# Patient Record
Sex: MALE | Race: WHITE
[De-identification: names, ages, dates, MRNs, and addresses within clinical notes are randomized per-mention and may not be internally consistent; named-entity substitution may affect disease eponyms.]

---

## 2018-01-15 ENCOUNTER — HOSPITAL ENCOUNTER (INPATIENT)
Dept: HOSPITAL 92 - ERS | Age: 78
LOS: 22 days | Discharge: ANOTHER HOSPITAL | DRG: 870 | End: 2018-02-06
Attending: FAMILY MEDICINE | Admitting: FAMILY MEDICINE
Payer: MEDICARE

## 2018-01-15 VITALS — BODY MASS INDEX: 20.7 KG/M2

## 2018-01-15 DIAGNOSIS — I24.8: ICD-10-CM

## 2018-01-15 DIAGNOSIS — Z79.01: ICD-10-CM

## 2018-01-15 DIAGNOSIS — E87.6: ICD-10-CM

## 2018-01-15 DIAGNOSIS — Z78.1: ICD-10-CM

## 2018-01-15 DIAGNOSIS — E11.9: ICD-10-CM

## 2018-01-15 DIAGNOSIS — I25.10: ICD-10-CM

## 2018-01-15 DIAGNOSIS — I27.20: ICD-10-CM

## 2018-01-15 DIAGNOSIS — I47.2: ICD-10-CM

## 2018-01-15 DIAGNOSIS — J96.02: ICD-10-CM

## 2018-01-15 DIAGNOSIS — I35.0: ICD-10-CM

## 2018-01-15 DIAGNOSIS — J44.9: ICD-10-CM

## 2018-01-15 DIAGNOSIS — E87.0: ICD-10-CM

## 2018-01-15 DIAGNOSIS — I42.9: ICD-10-CM

## 2018-01-15 DIAGNOSIS — I49.01: ICD-10-CM

## 2018-01-15 DIAGNOSIS — Z95.5: ICD-10-CM

## 2018-01-15 DIAGNOSIS — I11.0: ICD-10-CM

## 2018-01-15 DIAGNOSIS — I48.0: ICD-10-CM

## 2018-01-15 DIAGNOSIS — A41.9: Primary | ICD-10-CM

## 2018-01-15 DIAGNOSIS — Z99.81: ICD-10-CM

## 2018-01-15 DIAGNOSIS — I73.9: ICD-10-CM

## 2018-01-15 DIAGNOSIS — J44.0: ICD-10-CM

## 2018-01-15 DIAGNOSIS — E66.9: ICD-10-CM

## 2018-01-15 DIAGNOSIS — D69.6: ICD-10-CM

## 2018-01-15 DIAGNOSIS — Z95.0: ICD-10-CM

## 2018-01-15 DIAGNOSIS — R65.20: ICD-10-CM

## 2018-01-15 DIAGNOSIS — D50.9: ICD-10-CM

## 2018-01-15 DIAGNOSIS — I50.33: ICD-10-CM

## 2018-01-15 DIAGNOSIS — J96.21: ICD-10-CM

## 2018-01-15 DIAGNOSIS — I46.9: ICD-10-CM

## 2018-01-15 DIAGNOSIS — J18.9: ICD-10-CM

## 2018-01-15 DIAGNOSIS — I25.2: ICD-10-CM

## 2018-01-15 DIAGNOSIS — Z66: ICD-10-CM

## 2018-01-15 DIAGNOSIS — I48.2: ICD-10-CM

## 2018-01-15 LAB
ALBUMIN SERPL BCG-MCNC: 3.1 G/DL (ref 3.4–4.8)
ALP SERPL-CCNC: 51 U/L (ref 40–150)
ALT SERPL W P-5'-P-CCNC: 45 U/L (ref 8–55)
ANALYZER IN CARDIO: (no result)
ANION GAP SERPL CALC-SCNC: 15 MMOL/L (ref 10–20)
AST SERPL-CCNC: 41 U/L (ref 5–34)
BASE EXCESS STD BLDA CALC-SCNC: 5.8 MEQ/L
BILIRUB SERPL-MCNC: 0.5 MG/DL (ref 0.2–1.2)
BUN SERPL-MCNC: 22 MG/DL (ref 8.4–25.7)
CA-I BLDA-SCNC: 1.2 MMOL/L (ref 1.12–1.3)
CALCIUM SERPL-MCNC: 9 MG/DL (ref 7.8–10.44)
CHLORIDE SERPL-SCNC: 94 MMOL/L (ref 98–107)
CK MB SERPL-MCNC: 2.1 NG/ML (ref 0–6.6)
CK SERPL-CCNC: 21 U/L (ref 30–200)
CO2 SERPL-SCNC: 31 MMOL/L (ref 23–31)
CREAT CL PREDICTED SERPL C-G-VRATE: 0 ML/MIN (ref 70–130)
CRYSTAL-AUWI FLAG: 0.5 (ref 0–15)
GLOBULIN SER CALC-MCNC: 2.3 G/DL (ref 2.4–3.5)
GLUCOSE SERPL-MCNC: 229 MG/DL (ref 83–110)
HCO3 BLDA-SCNC: 32 MEQ/L (ref 22–26)
HCT VFR BLDA CALC: 28.9 % (ref 42–52)
HEV IGM SER QL: 16.5 (ref 0–7.99)
HGB BLD-MCNC: 9.9 G/DL (ref 14–18)
HGB BLDA-MCNC: 8.4 G/DL (ref 14–18)
HYALINE CASTS #/AREA URNS LPF: (no result) LPF
MCH RBC QN AUTO: 27.7 PG (ref 27–31)
MCV RBC AUTO: 92.8 FL (ref 80–94)
MDIFF COMPLETE?: YES
PATHC CAST-AUWI FLAG: 1.35 (ref 0–2.49)
PCO2 BLDA: 56.8 MMHG (ref 35–45)
PH BLDA: 7.37 [PH] (ref 7.35–7.45)
PLATELET # BLD AUTO: 365 THOU/UL (ref 130–400)
PLATELET BLD QL SMEAR: (no result)
PO2 BLDA: 87.9 MMHG (ref 80–100)
POTASSIUM SERPL-SCNC: 5.3 MMOL/L (ref 3.5–5.1)
RBC # BLD AUTO: 3.57 MILL/UL (ref 4.7–6.1)
RBC UR QL AUTO: (no result) HPF (ref 0–3)
SODIUM SERPL-SCNC: 135 MMOL/L (ref 136–145)
SP GR UR STRIP: 1.01 (ref 1–1.04)
SPECIMEN DRAWN FROM PATIENT: (no result)
SPERM-AUWI FLAG: 0 (ref 0–9.9)
TROPONIN I SERPL DL<=0.01 NG/ML-MCNC: 0.09 NG/ML (ref ?–0.03)
TROPONIN I SERPL DL<=0.01 NG/ML-MCNC: 0.91 NG/ML (ref ?–0.03)
TROPONIN I SERPL DL<=0.01 NG/ML-MCNC: 1.06 NG/ML (ref ?–0.03)
WBC # BLD AUTO: 27 THOU/UL (ref 4.8–10.8)
YEAST-AUWI FLAG: 0 (ref 0–25)

## 2018-01-15 PROCEDURE — 51702 INSERT TEMP BLADDER CATH: CPT

## 2018-01-15 PROCEDURE — 83880 ASSAY OF NATRIURETIC PEPTIDE: CPT

## 2018-01-15 PROCEDURE — 87070 CULTURE OTHR SPECIMN AEROBIC: CPT

## 2018-01-15 PROCEDURE — 96361 HYDRATE IV INFUSION ADD-ON: CPT

## 2018-01-15 PROCEDURE — 87040 BLOOD CULTURE FOR BACTERIA: CPT

## 2018-01-15 PROCEDURE — 96365 THER/PROPH/DIAG IV INF INIT: CPT

## 2018-01-15 PROCEDURE — 84484 ASSAY OF TROPONIN QUANT: CPT

## 2018-01-15 PROCEDURE — 80053 COMPREHEN METABOLIC PANEL: CPT

## 2018-01-15 PROCEDURE — 82553 CREATINE MB FRACTION: CPT

## 2018-01-15 PROCEDURE — 82805 BLOOD GASES W/O2 SATURATION: CPT

## 2018-01-15 PROCEDURE — 85007 BL SMEAR W/DIFF WBC COUNT: CPT

## 2018-01-15 PROCEDURE — P9016 RBC LEUKOCYTES REDUCED: HCPCS

## 2018-01-15 PROCEDURE — 82728 ASSAY OF FERRITIN: CPT

## 2018-01-15 PROCEDURE — C1769 GUIDE WIRE: HCPCS

## 2018-01-15 PROCEDURE — 86901 BLOOD TYPING SEROLOGIC RH(D): CPT

## 2018-01-15 PROCEDURE — 93005 ELECTROCARDIOGRAM TRACING: CPT

## 2018-01-15 PROCEDURE — 84157 ASSAY OF PROTEIN OTHER: CPT

## 2018-01-15 PROCEDURE — 71045 X-RAY EXAM CHEST 1 VIEW: CPT

## 2018-01-15 PROCEDURE — 85060 BLOOD SMEAR INTERPRETATION: CPT

## 2018-01-15 PROCEDURE — 86900 BLOOD TYPING SEROLOGIC ABO: CPT

## 2018-01-15 PROCEDURE — C9113 INJ PANTOPRAZOLE SODIUM, VIA: HCPCS

## 2018-01-15 PROCEDURE — 80048 BASIC METABOLIC PNL TOTAL CA: CPT

## 2018-01-15 PROCEDURE — 82746 ASSAY OF FOLIC ACID SERUM: CPT

## 2018-01-15 PROCEDURE — 94150 VITAL CAPACITY TEST: CPT

## 2018-01-15 PROCEDURE — 83540 ASSAY OF IRON: CPT

## 2018-01-15 PROCEDURE — 83550 IRON BINDING TEST: CPT

## 2018-01-15 PROCEDURE — 85025 COMPLETE CBC W/AUTO DIFF WBC: CPT

## 2018-01-15 PROCEDURE — 81001 URINALYSIS AUTO W/SCOPE: CPT

## 2018-01-15 PROCEDURE — 94003 VENT MGMT INPAT SUBQ DAY: CPT

## 2018-01-15 PROCEDURE — 85027 COMPLETE CBC AUTOMATED: CPT

## 2018-01-15 PROCEDURE — 96375 TX/PRO/DX INJ NEW DRUG ADDON: CPT

## 2018-01-15 PROCEDURE — 36416 COLLJ CAPILLARY BLOOD SPEC: CPT

## 2018-01-15 PROCEDURE — 82550 ASSAY OF CK (CPK): CPT

## 2018-01-15 PROCEDURE — 5A1955Z RESPIRATORY VENTILATION, GREATER THAN 96 CONSECUTIVE HOURS: ICD-10-PCS | Performed by: EMERGENCY MEDICINE

## 2018-01-15 PROCEDURE — 83986 ASSAY PH BODY FLUID NOS: CPT

## 2018-01-15 PROCEDURE — 80202 ASSAY OF VANCOMYCIN: CPT

## 2018-01-15 PROCEDURE — 88112 CYTOPATH CELL ENHANCE TECH: CPT

## 2018-01-15 PROCEDURE — 83605 ASSAY OF LACTIC ACID: CPT

## 2018-01-15 PROCEDURE — 83615 LACTATE (LD) (LDH) ENZYME: CPT

## 2018-01-15 PROCEDURE — 86850 RBC ANTIBODY SCREEN: CPT

## 2018-01-15 PROCEDURE — 71250 CT THORAX DX C-: CPT

## 2018-01-15 PROCEDURE — 88305 TISSUE EXAM BY PATHOLOGIST: CPT

## 2018-01-15 PROCEDURE — 82945 GLUCOSE OTHER FLUID: CPT

## 2018-01-15 PROCEDURE — 93306 TTE W/DOPPLER COMPLETE: CPT

## 2018-01-15 PROCEDURE — 94640 AIRWAY INHALATION TREATMENT: CPT

## 2018-01-15 PROCEDURE — 87149 DNA/RNA DIRECT PROBE: CPT

## 2018-01-15 PROCEDURE — 83735 ASSAY OF MAGNESIUM: CPT

## 2018-01-15 PROCEDURE — 36430 TRANSFUSION BLD/BLD COMPNT: CPT

## 2018-01-15 PROCEDURE — A4216 STERILE WATER/SALINE, 10 ML: HCPCS

## 2018-01-15 PROCEDURE — 93458 L HRT ARTERY/VENTRICLE ANGIO: CPT

## 2018-01-15 PROCEDURE — 76942 ECHO GUIDE FOR BIOPSY: CPT

## 2018-01-15 PROCEDURE — 94002 VENT MGMT INPAT INIT DAY: CPT

## 2018-01-15 PROCEDURE — 89051 BODY FLUID CELL COUNT: CPT

## 2018-01-15 PROCEDURE — 36415 COLL VENOUS BLD VENIPUNCTURE: CPT

## 2018-01-15 PROCEDURE — 82607 VITAMIN B-12: CPT

## 2018-01-15 PROCEDURE — 87205 SMEAR GRAM STAIN: CPT

## 2018-01-15 RX ADMIN — VANCOMYCIN HYDROCHLORIDE SCH MLS: 1 INJECTION, SOLUTION INTRAVENOUS at 21:54

## 2018-01-15 RX ADMIN — VANCOMYCIN HYDROCHLORIDE SCH MLS: 1 INJECTION, SOLUTION INTRAVENOUS at 09:56

## 2018-01-15 RX ADMIN — Medication SCH ML: at 20:12

## 2018-01-15 RX ADMIN — Medication SCH ML: at 09:20

## 2018-01-15 RX ADMIN — CEFEPIME HYDROCHLORIDE SCH MLS: 2 INJECTION, POWDER, FOR SOLUTION INTRAVENOUS at 21:10

## 2018-01-15 RX ADMIN — CEFEPIME HYDROCHLORIDE SCH MLS: 2 INJECTION, POWDER, FOR SOLUTION INTRAVENOUS at 09:19

## 2018-01-15 NOTE — CON
DATE OF CONSULTATION:  01/15/2018

 

HISTORY OF PRESENT ILLNESS:  Mr. Montelongo is a 77-year-old male.  He is not someone who has been in [facility] frequently here.

 

He presented via EMS after being in the field, intubated.

 

EMS was called for shortness of breath.  Apparently they started him on CPAP, he became unresponsive,
 so he was intubated.

 

He received 200 ketamine, 100 mg of rocuronium and 10 mg of Versed prior to arrival reportedly.

 

PAST MEDICAL HISTORY:  According to the ER records are remarkable for coronary disease, cardiomyopath
y, atrial fibrillation, diabetes, hypertension, COPD, asthma, pneumonia and a pacemaker.

 

SOCIAL HISTORY:  It is unknown whether he smokes or drinks.

 

ALLERGIES:  There are no reported drug allergies prior to admission.

 

MEDICATIONS:  It is unclear what medicines he was on.

 

REVIEW OF SYSTEMS:  Cannot be obtained.

 

PHYSICAL EXAMINATION:

VITAL SIGNS:  Blood pressure 124/37, heart rate 71, he is afebrile, respiratory rate is 20, oximetry 
is 99.

HEENT:  Pupils are equal.  Sclerae are anicteric.

LUNGS:  Clear.

GENERAL:  He appears chronically ill.

HEART:  Regular rhythm.  S1 and S2 are normal.

ABDOMEN:  Soft and nontender.

EXTREMITIES:  Without clubbing, cyanosis, or edema.

 

LABORATORY DATA:  White count 27, hemoglobin 9.9, platelets 365.  Sodium 135, potassium 5.3, chloride
 94, bicarbonate 31, BUN 22, creatinine 0.9, glucose 229, AST 41, ALT 45.  CPK was 21, troponin 0.094
.  BNP 1471, albumin 3.1.  Chest radiographs, reviewed by me, shows diffuse infiltrates, worse on the
 right.

 

IMPRESSION:  Respiratory failure.  In my opinion, his acute onset and chest radiographs are more cons
istent with congestive heart failure than pneumonia.  I do agree with treating with pneumonia at [facility] initially.  Echocardiogram has been ordered.  His cardiologist should be consulted.  It is unclear 
to me who he sees at this time.  When family becomes available, I will be happy to meet with them.

 

I found a stress test report from 2011, but this was ordered by one of the hospitalists.

 

Getting into the old computer system, he has been seen by Dr. Cameron in 2011.  He was intubated in [facility] emergency department at that admission; he was extubated on the 10th.

 

I cannot find any evidence that he has been seen by a cardiologist here.  He did have a severe FEV1 r
eduction on PFTs done when he was seen in 2011 by Dr. Cameron.  We will continue to support him.  We 
will await echocardiogram, and I will review that.  We will probably consult Cardiology.  We will con
tinue with IV antibiotics for now.  Steroids will be added and nebulizer treatments will be added as 
well.

 

Critical care time 35 minutes.

## 2018-01-15 NOTE — RAD
SINGLE VIEW OF THE CHEST:

 

COMPARISON: 

12/16/11.

 

HISTORY: 

Respiratory distress.

 

FINDINGS: 

A single view of the chest shows an enlarged but stable cardiomediastinal silhouette.  An endotrachea
l tube is seen with its tip between the clavicles.  An NG tube has its tip in the stomach and its sánchez
eport near the esophagogastric junction.  A left-sided pacemaker has been placed in the interval.  It
s leads are in the right atrium and ventricle.  There appears to be elevation of the right hemidiaphr
agm, but this could represent a moderate right pleural effusion.  There are multifocal increased inte
rstitial lung markings.  There appear to be superimposed multifocal infiltrates.  There are remote he
aled right rib fractures.

 

IMPRESSION: 

1.  Moderate right pleural effusion.

2.  Multifocal pneumonia.

 

POS: [facility]

## 2018-01-15 NOTE — HP
CHIEF COMPLAINT:  Short of breath, intubated.

 

HISTORY OF PRESENT ILLNESS:  This is a 77-year-old man with a history of chronic obstructive pulmonar
y disease, on home oxygen.  He was noted to have respiratory distress at home.  EMS was activated.  KAYLI mota was given nebulizer and CPAP was placed, and he became more altered and breathing was stopped, so kayli
e was intubated by the EMS with a 7.0 tube and given Versed and they brought him to the ER. 

 

PAST MEDICAL HISTORY:  History of atrial fibrillation, COPD, pacemaker, MI.

 

MEDICATIONS:  He is taking levofloxacin 750 daily, Norco 10/325 daily, methylprednisone 4 mg daily, a
torvastatin 80 mg daily, Symbicort aerosol twice daily, clopidogrel 75 daily, diltiazem 240 mg daily,
 Multaq 400 mg daily, ferrous sulfate 325 every day, furosemide 40 mg every day, gabapentin 300 mg da
marcus, hydroxyzine 20 mg daily, DuoNeb, Combivent, Lopressor 50 daily, potassium daily, Xarelto 20 mg d
aily, Spiriva 2 puffs daily.

 

REVIEW OF SYSTEMS:  Not possible, because intubated and sedated.

 

PHYSICAL EXAMINATION:

GENERAL:  He is a 77-year-old man, lying in the bed, intubated, and sedated on FiO2 50%, tidal volume
 350 and PEEP 5.

HEENT:  Head is normocephalic.  Pupils are round and reactive.  Ears, nose, throat normal.  NECK:  Lerma
pple, no JVD.  Trachea midline.

CHEST EXAMINATION:  Intubated and he has bilateral decreased breath sounds with diffuse expiratory wh
eezing or rhonchi.

CARDIOVASCULAR:  S1, S2 audible.  No S3 or S4.

ABDOMEN:  Soft.  Bowel sounds audible.  No distention, no mass, no organomegaly.

EXTREMITIES:  He has bilateral pedal edema present that is +2.

NEUROLOGIC:  He is a GCS 3, intubated and sedated.

SKIN:  Normal in color.

 

LABORATORY DATA:  Shows EKG, atrial fibrillation with controlled rate, no ST changes.  A chest x-ray 
shows right upper lobe infiltrate.

 

Lab shows WBC of 27.0, hematocrit 33.1, hemoglobin 9.0, platelets 365.  Sodium 135, potassium 5.3, ch
loride 94, carbon dioxide 31, BUN 20, creatinine 0.9, GFR 80.  Lactic acid 3.7, calcium 9.0, total bi
lirubin 0.5, AST 41, ALT 45.  Troponin 0.094.  BNP 1471.8.  Serum protein 5.4, albumin 3.1, and globu
codie 2.3, albumin ratio 1.3.  Blood gas shows a pH of 7.37, pCO2 of 56, pO2 of 87, bicarbonate 32.  Ch
est x-ray shows the above infiltrate.

 

ASSESSMENT AND PLAN:

1.  Acute respiratory failure with hypoxia and hypercapnia.

2.  Chronic obstructive pulmonary disease.

3.  Intubated and sedated.  Continue ventilator protocol, IV propofol for sedation.  Intensivist, Dr. Dodd, consulted.

4.  Sepsis secondary to pneumonia.  Blood culture x2 was done.  Continue IV Rocephin and Zithromax.  
Follow CBC.

5.  History of chronic obstructive pulmonary disease.  Continue nebulizer.

6.  Deep venous thrombosis prophylaxis will be Lovenox and stress ulcer prophylaxis PPI.

 

PROGNOSIS:  Guarded.

## 2018-01-15 NOTE — CON
DATE OF CONSULTATION:  01/15/2018

 

REASON FOR CONSULTATION:  Respiratory failure.

 

HISTORY OF PRESENT ILLNESS:  Ms. Montelongo is a 77-year-old man.

 

The patient was brought to the hospital with respiratory failure and is intubated on the ventilator, 
very little history is available at the present time.

 

The patient was found to be in respiratory failure in the field, EMS intubated and brought him to her
e.

 

PAST MEDICAL HISTORY:

1.  The patient has a history of coronary artery disease, but no other information is available.

2.  History of cardiomyopathy.

3.  History of atrial fibrillation.

4.  Diabetes.

5.  COPD.

6.  Asthma.

7.  Pneumonia.

8.  Previous pacemaker.

 

SOCIAL HISTORY:  Unknown if he smokes or drinks.

 

ALLERGIES:  No reported drug allergies.

 

MEDICATIONS:  Unknown at the present time.

 

REVIEW OF SYSTEMS:  Not obtainable, intubated on the ventilator.

 

PHYSICAL EXAMINATION:

GENERAL:  This is a critically ill-appearing elderly gentleman who looks older than his stated age at
 77.

VITAL SIGNS:  His blood pressure is 124/40, pulse is 70.

HEENT:  Eyes nonicteric.

NECK:  Neck veins are normal.  Carotid normal upstrokes.

LUNGS:  There is diffuse expiratory wheezing, some rhonchi.

CARDIAC:  Normal S1, normal S2.  Somewhat distant.  I do not hear a murmur, rub or gallop.

ABDOMEN:  Soft, nontender, no hepatosplenomegaly.

EXTREMITIES:  No clubbing or cyanosis.  There is moderate edema.  Warm and dry.  Peripheral pulses:  
He has femoral pulses bilaterally.  I do not feel pedal pulses on either foot, I do not feel poplitea
l pulses.

 

He is intubated and sedated.

 

LABORATORY AND X-RAY FINDINGS:  EKG reveals what looks like atrial fibrillation with some ST and T-wa
ve changes, consider ischemia.

 

Chest x-ray shows congestive heart failure, pulmonary vascular congestion, large right pleural effusi
on, probable pneumonia.  The pacemaker is in place as well.

 

Other laboratory data:  BNP is elevated at 1471.8.  Troponin 0.094 initially and 0.9 followup.

 

ASSESSMENT:

1.  Congestive heart failure.  Echocardiogram showed normal left ventricular function; therefore, it 
appears to be acute diastolic heart failure, probably on chronic, most likely acute on chronic diasto
lic heart failure.

2.  Non-ST elevation infarction, probably demand ischemia secondary likely to respiratory insufficien
cy.

3.  Underlying coronary artery disease.

4.  Atrial fibrillation, unknown chronicity.

 

PLAN:

1.  Hopefully, some old records could be obtained.

2.  We will give him some intravenous Lasix.

3.  Aspirin.

 

Prognosis is guarded in this gentleman.  Hopefully, as mentioned other information could be obtained 
and it appears he also has pneumonia.

## 2018-01-16 LAB
ALBUMIN SERPL BCG-MCNC: 2.9 G/DL (ref 3.4–4.8)
ALP SERPL-CCNC: 43 U/L (ref 40–150)
ALT SERPL W P-5'-P-CCNC: 30 U/L (ref 8–55)
ANALYZER IN CARDIO: (no result)
ANION GAP SERPL CALC-SCNC: 12 MMOL/L (ref 10–20)
ANISOCYTOSIS BLD QL SMEAR: (no result) (100X)
AST SERPL-CCNC: 22 U/L (ref 5–34)
BASE EXCESS STD BLDA CALC-SCNC: 8.6 MEQ/L
BASOPHILS # BLD AUTO: 0 THOU/UL (ref 0–0.2)
BASOPHILS NFR BLD AUTO: 0.1 % (ref 0–1)
BILIRUB SERPL-MCNC: 0.5 MG/DL (ref 0.2–1.2)
BUN SERPL-MCNC: 22 MG/DL (ref 8.4–25.7)
CA-I BLDA-SCNC: 1.1 MMOL/L (ref 1.12–1.3)
CALCIUM SERPL-MCNC: 8.8 MG/DL (ref 7.8–10.44)
CHLORIDE SERPL-SCNC: 99 MMOL/L (ref 98–107)
CO2 SERPL-SCNC: 32 MMOL/L (ref 23–31)
CREAT CL PREDICTED SERPL C-G-VRATE: 81 ML/MIN (ref 70–130)
EOSINOPHIL # BLD AUTO: 0 THOU/UL (ref 0–0.7)
EOSINOPHIL NFR BLD AUTO: 0.2 % (ref 0–10)
GLOBULIN SER CALC-MCNC: 2.1 G/DL (ref 2.4–3.5)
GLUCOSE SERPL-MCNC: 112 MG/DL (ref 83–110)
HCO3 BLDA-SCNC: 31 MEQ/L (ref 22–26)
HGB BLD-MCNC: 8.3 G/DL (ref 14–18)
HGB BLD-MCNC: 8.3 G/DL (ref 14–18)
HGB BLDA-MCNC: 8.2 G/DL (ref 14–18)
LYMPHOCYTES # BLD: 0.7 THOU/UL (ref 1.2–3.4)
LYMPHOCYTES NFR BLD AUTO: 6.3 % (ref 21–51)
MCH RBC QN AUTO: 28.7 PG (ref 27–31)
MCH RBC QN AUTO: 29 PG (ref 27–31)
MCV RBC AUTO: 89.7 FL (ref 80–94)
MCV RBC AUTO: 89.7 FL (ref 80–94)
MDIFF COMPLETE?: YES
MONOCYTES # BLD AUTO: 0.2 THOU/UL (ref 0.11–0.59)
MONOCYTES NFR BLD AUTO: 1.7 % (ref 0–10)
NEUTROPHILS # BLD AUTO: 10.8 THOU/UL (ref 1.4–6.5)
NEUTROPHILS NFR BLD AUTO: 91.8 % (ref 42–75)
O2 A-A PPRESDIFF RESPIRATORY: 125.4 MM[HG] (ref 0–20)
PCO2 BLDA: 33.6 MMHG (ref 35–45)
PH BLDA: 7.58 [PH] (ref 7.35–7.45)
PLATELET # BLD AUTO: 206 THOU/UL (ref 130–400)
PLATELET # BLD AUTO: 206 THOU/UL (ref 130–400)
PLATELET BLD QL SMEAR: (no result)
PO2 BLDA: 117.8 MMHG (ref 80–100)
POTASSIUM SERPL-SCNC: 4.5 MMOL/L (ref 3.5–5.1)
RBC # BLD AUTO: 2.88 MILL/UL (ref 4.7–6.1)
RBC # BLD AUTO: 2.89 MILL/UL (ref 4.7–6.1)
SCHISTOCYTES BLD QL SMEAR: (no result) (100X)
SODIUM SERPL-SCNC: 138 MMOL/L (ref 136–145)
SPECIMEN DRAWN FROM PATIENT: (no result)
VANCOMYCIN TROUGH SERPL-MCNC: 16.6 UG/ML
WBC # BLD AUTO: 11.8 THOU/UL (ref 4.8–10.8)
WBC # BLD AUTO: 11.8 THOU/UL (ref 4.8–10.8)

## 2018-01-16 RX ADMIN — Medication SCH ML: at 09:19

## 2018-01-16 RX ADMIN — VANCOMYCIN HYDROCHLORIDE SCH MLS: 1 INJECTION, SOLUTION INTRAVENOUS at 22:19

## 2018-01-16 RX ADMIN — VANCOMYCIN HYDROCHLORIDE SCH MLS: 1 INJECTION, SOLUTION INTRAVENOUS at 09:19

## 2018-01-16 RX ADMIN — Medication SCH ML: at 20:12

## 2018-01-16 RX ADMIN — CEFEPIME HYDROCHLORIDE SCH MLS: 2 INJECTION, POWDER, FOR SOLUTION INTRAVENOUS at 20:12

## 2018-01-16 RX ADMIN — CEFEPIME HYDROCHLORIDE SCH MLS: 2 INJECTION, POWDER, FOR SOLUTION INTRAVENOUS at 09:18

## 2018-01-16 NOTE — PDOC.PN
- Subjective


Encounter Start Date: 01/16/18


Encounter Start Time: 16:38





Patient seen and examined. No new complaints. No overnight events





- Objective


MAR Reviewed: Yes


Vital Signs & Weight: 


 Vital Signs (12 hours)











  Temp Pulse Resp BP Pulse Ox


 


 01/16/18 16:00  99.3 F   22 H  


 


 01/16/18 14:36   82   109/44 L 


 


 01/16/18 14:34   85  20   97


 


 01/16/18 14:00    21 H  


 


 01/16/18 12:00  99.4 F   20  


 


 01/16/18 11:19   77   119/43 L 


 


 01/16/18 11:18   81  23 H   97


 


 01/16/18 10:00    20  


 


 01/16/18 08:00    20  


 


 01/16/18 07:44  99.3 F  74  20   98


 


 01/16/18 07:10   74   130/43 L 


 


 01/16/18 07:08   71  20   99


 


 01/16/18 07:00  99.3 F    


 


 01/16/18 06:00    20  








 Weight











Admit Weight                   158 lb


 


Weight                         158 lb 8.198 oz











 Most Recent Monitor Data











Heart Rate from ECG            82


 


NIBP                           109/44


 


NIBP BP-Mean                   84


 


Respiration from ECG           21


 


SpO2                           99














I&O: 


 











 01/15/18 01/16/18 01/17/18





 06:59 06:59 06:59


 


Intake Total  1877 200


 


Output Total  2315 635


 


Balance  -438 -435











Result Diagrams: 


 01/16/18 04:37





 01/16/18 04:37





Phys Exam





- Physical Examination


intubated


HEENT: moist MMs


Neck: no JVD


decreased bs at bases


Cardiovascular: no significant murmur


Musculoskeletal: pulses present





Dx/Plan


(1) Acute respiratory failure


Code(s): J96.00 - ACUTE RESPIRATORY FAILURE, UNSP W HYPOXIA OR HYPERCAPNIA   

Status: Acute   





(2) PNA (pneumonia)


Code(s): J18.9 - PNEUMONIA, UNSPECIFIED ORGANISM   Status: Acute   





(3) Sepsis


Code(s): A41.9 - SEPSIS, UNSPECIFIED ORGANISM   Status: Acute   





(4) Pleural effusion


Code(s): J90 - PLEURAL EFFUSION, NOT ELSEWHERE CLASSIFIED   Status: Acute   





- Plan





* cont vent support


* abx


* gentle diuresis


* f/u pulm and card plan

## 2018-01-16 NOTE — CT
EXAM: CT chest without contrast

 

DATE: 1/16/18

 

COMPARISON: 1/16/18

 

FINDINGS: 

 

The patient is intubated, endotracheal tube tip below the clavicles.  An enteric tube tip is in the g
astric body.

 

There are large bilateral layering pleural effusions.  Heart size is enlarged.  No pericardial effusi
on. 

 

Numerous mediastinal lymph nodes are present, the majority of these contain normal fatty hilum.

 

Healing right lateral 3rd, right lateral 4th, right anterior 4th, right lateral 5th, right lateral 6t
h, 8th, 9th, 10th rib fractures.  There are also healing left anterior 4th, 5th, and 6th rib fracture
s, and lateral 7th, 8th, 9th, and 10th rib fractures.

 

There are spine compression fractures at T4 and T5 and T11.  The gallbladder is mildly distended.  Th
e spleen measures 14.5 cm. 

 

IMPRESSION: 

1.  Large bilateral layering pleural effusions.

2.  Airspace consolidation in the right middle lobe and right upper lobe concerning for pneumonia.  

3.  Cardiomegaly.

4.  Numerous bilateral rib fractures.

 

POS: [facility]

## 2018-01-16 NOTE — PRG
DATE OF SERVICE:  01/16/2018

 

SUBJECTIVE:  Mr. Montelongo is intubated and on the ventilator, sedated.

 

OBJECTIVE:

VITAL SIGNS:  Blood pressure 119/43, pulse 80.

LUNGS:  Clear.

CARDIAC:  Heart sounds are easier to hear today.  There is a 2-3/6 crescendo-decrescendo murmur along
 the left sternal border and systolic.

ABDOMEN:  Soft, nontender.

EXTREMITIES:  Only mild edema.

 

IMAGING:  Echocardiogram, normal ejection fraction, mild to moderate aortic stenosis.

 

ASSESSMENT:

1.  Respiratory failure.

2.  Coronary artery disease, no details available.

3.  Mild to moderate aortic stenosis.

4.  History of atrial fibrillation.

5.  Previous pacemaker insertion.

6.  Pacemaker is a [employer] device, normal device check, A-pacing 93%, currently in sinus rhythm, 25 
episodes of atrial fibrillation since October, battery is okay.

 

PLAN:

1.  Continue current medical regimen.

2.  Continue intravenous furosemide.

3.  Reduce Lovenox back to 40 q.12 hours as he is maintaining sinus rhythm.

## 2018-01-16 NOTE — PRG
DATE OF SERVICE:  01/16/2018

 

SUBJECTIVE:  Mr. Montelongo is hemodynamically stable overnight.

 

PHYSICAL EXAMINATION:

VITAL SIGNS:  His temperature maximum was 99.4.  Respiratory rate is in 20s 
when he is sedated.  Heart rate is 94, blood pressure 124/59.  Intake and 
output is -438.

GENERAL:  He will awaken.  He is pretty much all over the bed if he awakens.

LUNGS:  Remarkable for crackles bilaterally.

HEART:  Regular rhythm.

ABDOMEN:  Soft.

 

LABORATORY DATA AND IMAGING DATA:  White count 13.8, hemoglobin 8.3, platelets 
206,000.  Sodium 138, potassium 4.5, chloride 99, bicarbonate 32, BUN 22, 
creatinine 0.78.  Chest radiograph reviewed by me shows what appears to be a 
large right effusion.  Echocardiogram showed noncritical aortic stenosis and a 
normal ejection fraction.

 

IMPRESSION:

1.  Pneumonia.

2.  Respiratory failure.

3.  ? underlying pulmonary fibrosis.  I do not have any old imaging for 
comparison.

 

PLAN:  Continue ventilatory support.  CT scanning of his chest today without 
contrast.  Continue gentle diuresis.

 

CRITICAL CARE TIME :  Reviewing radiographs records and coordinating care 30 
minutes.

 

[facility]

## 2018-01-16 NOTE — RAD
CHEST ONE VIEW:

 

History: Dyspnea, follow up. 

 

Comparison: 1-15-18

 

FINDINGS: 

Cardiac silhouette is magnified by projection and partially obscured by pleural fluid and patchy biba
silar infiltrates, right greater than left. Pulmonary vasculature remains engorged with wide-spread r
eticular nodular interstitial prominence. Patient rotated leftward. Lines and tubes appear unchanged 
in position. 

 

IMPRESSION: 

1. Pulmonary edema with pleural fluid and other findings is stable compared to the previous exam. 

 

POS: [facility]

## 2018-01-17 LAB
ALBUMIN SERPL BCG-MCNC: 2.8 G/DL (ref 3.4–4.8)
ALP SERPL-CCNC: 44 U/L (ref 40–150)
ALT SERPL W P-5'-P-CCNC: 25 U/L (ref 8–55)
ANALYZER IN CARDIO: (no result)
ANION GAP SERPL CALC-SCNC: 15 MMOL/L (ref 10–20)
AST SERPL-CCNC: 20 U/L (ref 5–34)
BASE EXCESS STD BLDA CALC-SCNC: 7.8 MEQ/L
BASOPHILS # BLD AUTO: 0 THOU/UL (ref 0–0.2)
BASOPHILS NFR BLD AUTO: 0.1 % (ref 0–1)
BILIRUB SERPL-MCNC: 0.6 MG/DL (ref 0.2–1.2)
BUN SERPL-MCNC: 23 MG/DL (ref 8.4–25.7)
CALCIUM SERPL-MCNC: 9.1 MG/DL (ref 7.8–10.44)
CHLORIDE SERPL-SCNC: 101 MMOL/L (ref 98–107)
CO2 SERPL-SCNC: 26 MMOL/L (ref 23–31)
CREAT CL PREDICTED SERPL C-G-VRATE: 85 ML/MIN (ref 70–130)
EOSINOPHIL # BLD AUTO: 0 THOU/UL (ref 0–0.7)
EOSINOPHIL NFR BLD AUTO: 0.1 % (ref 0–10)
GLOBULIN SER CALC-MCNC: 2.5 G/DL (ref 2.4–3.5)
GLUCOSE SERPL-MCNC: 117 MG/DL (ref 83–110)
HCO3 BLDA-SCNC: 33.1 MEQ/L (ref 22–26)
HGB BLD-MCNC: 9.5 G/DL (ref 14–18)
HGB BLD-MCNC: 9.5 G/DL (ref 14–18)
HGB BLDA-MCNC: 8.2 G/DL (ref 14–18)
LYMPHOCYTES # BLD: 0.7 THOU/UL (ref 1.2–3.4)
LYMPHOCYTES NFR BLD AUTO: 8.5 % (ref 21–51)
MCH RBC QN AUTO: 29.1 PG (ref 27–31)
MCH RBC QN AUTO: 29.5 PG (ref 27–31)
MCV RBC AUTO: 89.5 FL (ref 80–94)
MCV RBC AUTO: 89.8 FL (ref 80–94)
MDIFF COMPLETE?: YES
MONOCYTES # BLD AUTO: 0.2 THOU/UL (ref 0.11–0.59)
MONOCYTES NFR BLD AUTO: 2.5 % (ref 0–10)
NEUTROPHILS # BLD AUTO: 7.7 THOU/UL (ref 1.4–6.5)
NEUTROPHILS NFR BLD AUTO: 88.8 % (ref 42–75)
PCO2 BLDA: 50.8 MMHG (ref 35–45)
PH BLDA: 7.43 [PH] (ref 7.35–7.45)
PLATELET # BLD AUTO: 202 THOU/UL (ref 130–400)
PLATELET # BLD AUTO: 204 THOU/UL (ref 130–400)
PLATELET BLD QL SMEAR: (no result)
PO2 BLDA: 80.3 MMHG (ref 80–100)
POTASSIUM SERPL-SCNC: 4 MMOL/L (ref 3.5–5.1)
RBC # BLD AUTO: 3.23 MILL/UL (ref 4.7–6.1)
RBC # BLD AUTO: 3.28 MILL/UL (ref 4.7–6.1)
SODIUM SERPL-SCNC: 138 MMOL/L (ref 136–145)
SPECIMEN DRAWN FROM PATIENT: (no result)
WBC # BLD AUTO: 8.7 THOU/UL (ref 4.8–10.8)
WBC # BLD AUTO: 8.7 THOU/UL (ref 4.8–10.8)

## 2018-01-17 RX ADMIN — CEFEPIME HYDROCHLORIDE SCH MLS: 2 INJECTION, POWDER, FOR SOLUTION INTRAVENOUS at 20:25

## 2018-01-17 RX ADMIN — VANCOMYCIN HYDROCHLORIDE SCH MLS: 1 INJECTION, SOLUTION INTRAVENOUS at 10:25

## 2018-01-17 RX ADMIN — VANCOMYCIN HYDROCHLORIDE SCH MLS: 1 INJECTION, SOLUTION INTRAVENOUS at 21:28

## 2018-01-17 RX ADMIN — Medication SCH ML: at 20:25

## 2018-01-17 RX ADMIN — Medication SCH ML: at 08:09

## 2018-01-17 RX ADMIN — CEFEPIME HYDROCHLORIDE SCH MLS: 2 INJECTION, POWDER, FOR SOLUTION INTRAVENOUS at 08:07

## 2018-01-17 NOTE — PDOC.PN
- Subjective


Encounter Start Date: 01/17/18


Encounter Start Time: 08:40


-: old records requested/rev





Pt seen and exmained, chart reviewed in its entirety, this si my first visit 

with this patient





Admitted 2 days ago with PNA, Acute hypoxemic respiratory failure.  Pt sedation 

turned off, he bcame very anxious and tachypneic and restarted.  Pulm 

following.  NO other acute events overnight.





no F/C, no N/V/D/C, no arrhythmias





ROS not obtainable due to intubated and sedated status





- Objective


MAR Reviewed: Yes


Vital Signs & Weight: 


 Vital Signs (12 hours)











  Temp Pulse Resp BP Pulse Ox


 


 01/17/18 14:32   97   130/49 L 


 


 01/17/18 14:29   85  19   99


 


 01/17/18 14:00    20  


 


 01/17/18 12:00    22 H  


 


 01/17/18 11:00  98.6 F    


 


 01/17/18 10:50   84   113/51 L 


 


 01/17/18 10:49   84  32 H   98


 


 01/17/18 10:00    25 H  


 


 01/17/18 08:00  98.8 F  83  22 H   99


 


 01/17/18 07:01   85   118/50 L 


 


 01/17/18 07:00  98.8 F  77  23 H   99


 


 01/17/18 06:00    21 H  


 


 01/17/18 04:08   85   119/49 L 


 


 01/17/18 04:00    21 H  


 


 01/17/18 03:01   80  20   98


 


 01/17/18 03:00  98.4 F    








 Weight











Admit Weight                   158 lb


 


Weight                         157 lb 13.616 oz











 Most Recent Monitor Data











Heart Rate from ECG            88


 


NIBP                           130/49


 


NIBP BP-Mean                   94


 


Respiration from ECG           14


 


SpO2                           98














I&O: 


 











 01/16/18 01/17/18 01/18/18





 06:59 06:59 06:59


 


Intake Total 1877 2157 


 


Output Total 9005 7814 826


 


Balance -069 -528 -107











Result Diagrams: 


 01/17/18 05:23





 01/17/18 05:23


Radiology Reviewed by me: Yes


EKG Reviewed by me: Yes





Phys Exam





- Physical Examination


Constitutional: NAD


HEENT: PERRLA, moist MMs, sclera anicteric, oral pharynx no lesions


orally intubated


Neck: no nodes, no JVD, supple, full ROM


Respiratory: no wheezing, no rales, no rhonchi, clear to auscultation bilateral


Cardiovascular: RRR, no significant murmur, no rub


Gastrointestinal: soft, non-tender, no distention, positive bowel sounds


Musculoskeletal: pulses present, edema present


Neurological: moves all 4 limbs


Lymphatic: no nodes


Deviation from normal: sedated and intubated


Skin: no rash, normal turgor, cap refill <2 seconds





Dx/Plan


(1) Acute hypoxemic respiratory failure


Code(s): J96.01 - ACUTE RESPIRATORY FAILURE WITH HYPOXIA   Status: Acute   

Comment: intubated on vent, weaning per pulm,.  appreciate Dr Jerome's 

assistance   





(2) PNA (pneumonia)


Code(s): J18.9 - PNEUMONIA, UNSPECIFIED ORGANISM   Status: Acute   


Qualifiers: 


   Pneumonia type: due to unspecified organism   Laterality: bilateral   Lung 

location: unspecified part of lung   Qualified Code(s): J18.9 - Pneumonia, 

unspecified organism   





(3) Sepsis


Code(s): A41.9 - SEPSIS, UNSPECIFIED ORGANISM   Status: Acute   


Qualifiers: 


   Sepsis type: sepsis due to unspecified organism   Qualified Code(s): A41.9 - 

Sepsis, unspecified organism   





(4) COPD (chronic obstructive pulmonary disease)


Status: Chronic   


Qualifiers: 


   COPD type: unspecified COPD   Qualified Code(s): J44.9 - Chronic obstructive 

pulmonary disease, unspecified   





- Plan


cont current plan of care, continue antibiotics, PT/OT, respiratory therapy, 

DVT proph w/lovenox





* .

## 2018-01-17 NOTE — RAD
SINGLE VIEW OF THE CHEST:

 

Comparison: 1-16-18

 

History: Ventilated patient with respiratory failure. 

 

FINDINGS: 

Single view of the chest shows an enlarged but stable cardiomediastinal silhouette. The lines and tub
es are unchanged in position. The pacemaker is unchanged in position. There is a moderate right pleur
al effusion. There appear to be multifocal infiltrates, unchanged, right greater than left. 

 

IMPRESSION: 

Stable exam. 

 

POS: [facility]

## 2018-01-17 NOTE — PRG
DATE OF SERVICE:  01/17/2018

 

SUBJECTIVE:  Mr. Montelongo remains mechanically ventilated.  He is in no 
distress.  He is sedated.

 

OBJECTIVE:

VITAL SIGNS:  He is afebrile, heart rate 84, blood pressure 113/51, respiratory 
rate in the high 20s.

LUNGS:  Remarkable for crackles on the right with decreased sounds in both lung 
bases.

HEART:  Regular rhythm.

ABDOMEN:  Soft.

EXTREMITIES:  Without asymmetry.

 

LABORATORY DATA:  White count 8.7, hemoglobin 9.5, platelets 202, 88 segs, 3% 
bands.

 

Sodium 138, potassium 4, chloride 101, bicarbonate 26, BUN 23, creatinine 0.7, 
glucose 117, pH 7.43, CO2 50, pO2 80.

 

Intake and output is negative, 448.

 

Chest radiograph still shows alveolar infiltrates on the right with a pleural 
effusion.

 

IMPRESSION:

1.  Pneumonia.

2.  Bilateral effusions.

3.  Intermittent atrial fibrillation.

4.  Aortic stenosis, mild to moderate.

5.  History of coronary disease.

6.  History of pacemaker.

 

PLAN:  We will probably need to place a small chest tube on the right.  Culture 
of the fluid was sent it for analysis and lighting drain into pleurovac.  It 
does not appear to be loculated by CT scanning, it is unclear how much is old 
versus new.

 

We will try to obtain medical records from [facility].  X-ray reports 
would be at least a little helpful.

 

He is currently not weanable.

 

Critical care time was 30 minutes.

 

[facility]

## 2018-01-17 NOTE — PRG
DATE OF SERVICE:  01/17/2018

 

SUBJECTIVE:  Mr. Montelongo remains intubated on the ventilator.

 

REVIEW OF SYSTEMS:  Not obtainable.

 

OBJECTIVE:

VITAL SIGNS:  Blood pressure 123/52, pulse is in the 80s-90s, it is irregular, atrial fibrillation.

LUNGS:  Clear.

CARDIAC:  Irregularly irregular.

ABDOMEN:  Soft and nontender.

 

ASSESSMENT:

1.  Respiratory failure.

2.  Coronary artery disease, unknown status.

3.  Paroxysmal atrial fibrillation, known atrial fibrillation.

4.  Previous pacemaker implant.

 

PLAN:  Continue current medical regimen.  Plans are being made to try to wean him from the ventilator
.  Records have been requested from [facility].  Apparently, he was just discharged from there r
ecently.

## 2018-01-18 LAB
ANALYZER IN CARDIO: (no result)
ANION GAP SERPL CALC-SCNC: 13 MMOL/L (ref 10–20)
BASE EXCESS STD BLDA CALC-SCNC: 8.3 MEQ/L
BASOPHILS # BLD AUTO: 0 THOU/UL (ref 0–0.2)
BASOPHILS NFR BLD AUTO: 0 % (ref 0–1)
BUN SERPL-MCNC: 30 MG/DL (ref 8.4–25.7)
CA-I BLDA-SCNC: 1.2 MMOL/L (ref 1.12–1.3)
CALCIUM SERPL-MCNC: 8.8 MG/DL (ref 7.8–10.44)
CHLORIDE SERPL-SCNC: 101 MMOL/L (ref 98–107)
CO2 SERPL-SCNC: 30 MMOL/L (ref 23–31)
CREAT CL PREDICTED SERPL C-G-VRATE: 83 ML/MIN (ref 70–130)
EOSINOPHIL # BLD AUTO: 0 THOU/UL (ref 0–0.7)
EOSINOPHIL NFR BLD AUTO: 0.1 % (ref 0–10)
GLUCOSE SERPL-MCNC: 133 MG/DL (ref 83–110)
HCO3 BLDA-SCNC: 32.3 MEQ/L (ref 22–26)
HGB BLD-MCNC: 9 G/DL (ref 14–18)
HGB BLD-MCNC: 9.1 G/DL (ref 14–18)
HGB BLDA-MCNC: 9 G/DL (ref 14–18)
LYMPHOCYTES # BLD: 0.7 THOU/UL (ref 1.2–3.4)
LYMPHOCYTES NFR BLD AUTO: 7.4 % (ref 21–51)
MCH RBC QN AUTO: 27.3 PG (ref 27–31)
MCH RBC QN AUTO: 27.3 PG (ref 27–31)
MCV RBC AUTO: 88.6 FL (ref 80–94)
MCV RBC AUTO: 88.7 FL (ref 80–94)
MDIFF COMPLETE?: YES
MONOCYTES # BLD AUTO: 0.3 THOU/UL (ref 0.11–0.59)
MONOCYTES NFR BLD AUTO: 3.8 % (ref 0–10)
NEUTROPHILS # BLD AUTO: 8 THOU/UL (ref 1.4–6.5)
NEUTROPHILS NFR BLD AUTO: 88.8 % (ref 42–75)
O2 A-A PPRESDIFF RESPIRATORY: 120.6 MM[HG] (ref 0–20)
PCO2 BLDA: 42.6 MMHG (ref 35–45)
PH BLDA: 7.5 [PH] (ref 7.35–7.45)
PLATELET # BLD AUTO: 209 THOU/UL (ref 130–400)
PLATELET # BLD AUTO: 211 THOU/UL (ref 130–400)
PO2 BLDA: 75.7 MMHG (ref 80–100)
POTASSIUM SERPL-SCNC: 3.7 MMOL/L (ref 3.5–5.1)
RBC # BLD AUTO: 3.31 MILL/UL (ref 4.7–6.1)
RBC # BLD AUTO: 3.35 MILL/UL (ref 4.7–6.1)
SODIUM SERPL-SCNC: 140 MMOL/L (ref 136–145)
SPECIMEN DRAWN FROM PATIENT: (no result)
VANCOMYCIN TROUGH SERPL-MCNC: 22.1 UG/ML
WBC # BLD AUTO: 9.1 THOU/UL (ref 4.8–10.8)
WBC # BLD AUTO: 9.2 THOU/UL (ref 4.8–10.8)

## 2018-01-18 RX ADMIN — VANCOMYCIN HYDROCHLORIDE SCH MLS: 1 INJECTION, SOLUTION INTRAVENOUS at 09:39

## 2018-01-18 RX ADMIN — Medication SCH ML: at 21:35

## 2018-01-18 RX ADMIN — VANCOMYCIN HYDROCHLORIDE SCH MLS: 750 INJECTION, POWDER, LYOPHILIZED, FOR SOLUTION INTRAVENOUS at 21:35

## 2018-01-18 RX ADMIN — CEFEPIME HYDROCHLORIDE SCH MLS: 2 INJECTION, POWDER, FOR SOLUTION INTRAVENOUS at 21:34

## 2018-01-18 RX ADMIN — CEFEPIME HYDROCHLORIDE SCH MLS: 2 INJECTION, POWDER, FOR SOLUTION INTRAVENOUS at 08:41

## 2018-01-18 RX ADMIN — Medication SCH ML: at 08:42

## 2018-01-18 NOTE — RAD
CHEST 1 VIEW:

 

HISTORY: 

Dyspnea.  Followup.

 

COMPARISON: 

1/17/18.

 

FINDINGS: 

Cardiac silhouette is magnified and partially obscured by bilateral pleural fluid, right greater than
 left.  Pulmonary vasculature remains engorged.  Patchy airspace each lung is similar in appearance t
o the prior study.  Lines and tubes appear unchanged in position.

 

IMPRESSION: 

Pulmonary edema and other findings appear stable.

 

POS: [facility]

## 2018-01-18 NOTE — PDOC.PN
- Subjective


Encounter Start Date: 01/18/18


Encounter Start Time: 10:00


-: non-verbal





no acute events overnight, on PSV.  Plan to PSV today, tomorrow sit up and 

drain pleural fluid and extubate.  Pt still very anxios when sedation stopped.





No F/c, no Diarrhea, no vomiting, no acute event snoted





case discussed with Dr Jerome





- Objective


MAR Reviewed: Yes


Vital Signs & Weight: 


 Vital Signs (12 hours)











  Temp Pulse Resp BP Pulse Ox


 


 01/18/18 12:00  98.7 F   20  


 


 01/18/18 10:43   83   136/43 L 


 


 01/18/18 10:41   83  22 H   99


 


 01/18/18 10:00    22 H  


 


 01/18/18 08:00  99.6 F  91  22 H   99


 


 01/18/18 06:57   87   130/57 L 


 


 01/18/18 06:52   82  29 H   99


 


 01/18/18 06:00    22 H  


 


 01/18/18 04:00    26 H  


 


 01/18/18 03:00  98.7 F    


 


 01/18/18 02:51   88   119/43 L 


 


 01/18/18 02:49   80  24 H   99


 


 01/18/18 02:00    19  








 Weight











Admit Weight                   158 lb


 


Weight                         151 lb 0.266 oz











 Most Recent Monitor Data











Heart Rate from ECG            90


 


NIBP                           138/50


 


NIBP BP-Mean                   106


 


Respiration from ECG           21


 


SpO2                           98














I&O: 


 











 01/17/18 01/18/18 01/19/18





 06:59 06:59 06:59


 


Intake Total 2157 2747 200


 


Output Total 2605 2220 675


 


Balance -448 527 -475











Result Diagrams: 


 01/18/18 04:29





 01/18/18 04:29


Radiology Reviewed by me: Yes


EKG Reviewed by me: Yes





Phys Exam





- Physical Examination


Constitutional: NAD


HEENT: PERRLA, moist MMs, sclera anicteric, oral pharynx no lesions


Neck: no nodes, no JVD, supple, full ROM


Respiratory: no wheezing, no rhonchi, clear to auscultation bilateral


L>R posterior rales


Cardiovascular: RRR, no significant murmur, no rub


Gastrointestinal: soft, non-tender, no distention, positive bowel sounds


Musculoskeletal: pulses present, edema present


Neurological: moves all 4 limbs


Lymphatic: no nodes


Skin: no rash, normal turgor, cap refill <2 seconds





Dx/Plan


(1) Acute hypoxemic respiratory failure


Code(s): J96.01 - ACUTE RESPIRATORY FAILURE WITH HYPOXIA   Status: Acute   

Comment: intubated on vent, weaning per pulm,.  appreciate Dr Jerome's 

assistance   





(2) PNA (pneumonia)


Code(s): J18.9 - PNEUMONIA, UNSPECIFIED ORGANISM   Status: Acute   


Qualifiers: 


   Pneumonia type: due to unspecified organism   Laterality: bilateral   Lung 

location: unspecified part of lung   Qualified Code(s): J18.9 - Pneumonia, 

unspecified organism   





(3) Sepsis


Code(s): A41.9 - SEPSIS, UNSPECIFIED ORGANISM   Status: Acute   


Qualifiers: 


   Sepsis type: sepsis due to unspecified organism   Qualified Code(s): A41.9 - 

Sepsis, unspecified organism   





(4) COPD (chronic obstructive pulmonary disease)


Status: Chronic   


Qualifiers: 


   COPD type: unspecified COPD   Qualified Code(s): J44.9 - Chronic obstructive 

pulmonary disease, unspecified   





- Plan





* .

## 2018-01-18 NOTE — PRG
DATE OF SERVICE:  01/18/2018

 

We still have not received records from [facility].  

 

PHYSICAL EXAMINATION: 

VITAL SIGNS:  Blood pressure 136/43, heart rate 85, respiratory rate 23. 

GENERAL:  He is awake and alert,  moves all extremities  Nods appropriately.

LUNGS:  Remarkable for coarse equal breath sounds.  His chest exam is improved dramatically compared 
to when he presented.

CARDIOVASCULAR:  Regular rhythm.

ABDOMEN:  Soft.

 

LABORATORY:  White count 9.2, hemoglobin 9.1, platelets 211.  Sodium 140, potassium 3.7, chloride 101
, bicarb 30, BUN 30, creatinine 0.72.  Intake and output is positive 527.

 

IMPRESSION:

1.  Respiratory failure.

2.  Pneumonia.

3.  Bilateral pleural effusions.

4.  Valvular heart disease.

5.  Intermittent atrial fibrillation in the past per pacemaker download, now in atrial fibrillation.

6.  History of coronary disease.

 

PLAN:  Continue to await for records.  

 

We will decrease ventilatory support dramatically and see how he does today.  May consider sitting hi
m up, doing a thoracentesis on the right and then extubate him tomorrow if he has a good day.

 

Critical care time was 30 minutes.

## 2018-01-18 NOTE — PRG
DATE OF SERVICE:  01/18/2018

 

SUBJECTIVE:  Mr. Montelongo remains intubated on the ventilator.  He is sedated, but he has to be restra
ined.

 

PHYSICAL EXAMINATION: 

VITAL SIGNS:  Blood pressure is 140/50, pulse is in the 80-90 range, it is atrial fibrillation.

LUNGS:  Clear.

CARDIAC:  Irregular, irregular.

 

ASSESSMENT:

1.  Atrial fibrillation, paroxysmal, now it is persistent.

2.  Anemia.

3.  Respiratory failure. 

4.  Coronary disease.

 

PLAN:  

1.  I am still trying to obtain records from [facility].

2.  Continue supportive care.  No changes.

## 2018-01-19 LAB
ANION GAP SERPL CALC-SCNC: 13 MMOL/L (ref 10–20)
BUN SERPL-MCNC: 36 MG/DL (ref 8.4–25.7)
CALCIUM SERPL-MCNC: 8.7 MG/DL (ref 7.8–10.44)
CHLORIDE SERPL-SCNC: 102 MMOL/L (ref 98–107)
CO2 SERPL-SCNC: 29 MMOL/L (ref 23–31)
CREAT CL PREDICTED SERPL C-G-VRATE: 89 ML/MIN (ref 70–130)
GLUCOSE PLR-MCNC: 135 MG/DL
GLUCOSE SERPL-MCNC: 110 MG/DL (ref 83–110)
HGB BLD-MCNC: 9.6 G/DL (ref 14–18)
LDH PLR L TO P-CCNC: 68 U/L
MCH RBC QN AUTO: 27.6 PG (ref 27–31)
MCV RBC AUTO: 89.4 FL (ref 80–94)
MDIFF COMPLETE?: YES
NEUTROPHILS NFR FLD: 5 %
NONHEMATIC CELLS NFR FLD MANUAL: 78 %
PLATELET # BLD AUTO: 184 THOU/UL (ref 130–400)
POTASSIUM SERPL-SCNC: 3.8 MMOL/L (ref 3.5–5.1)
PROT PLR-MCNC: 1.4 G/DL
RBC # BLD AUTO: 3.48 MILL/UL (ref 4.7–6.1)
RBC # FLD AUTO: 87 /CUMM
SODIUM SERPL-SCNC: 140 MMOL/L (ref 136–145)
WBC # BLD AUTO: 12.3 THOU/UL (ref 4.8–10.8)
WBC # FLD MANUAL: 35 /CUMM

## 2018-01-19 PROCEDURE — 0W993ZX DRAINAGE OF RIGHT PLEURAL CAVITY, PERCUTANEOUS APPROACH, DIAGNOSTIC: ICD-10-PCS | Performed by: INTERNAL MEDICINE

## 2018-01-19 RX ADMIN — Medication SCH ML: at 20:10

## 2018-01-19 RX ADMIN — VANCOMYCIN HYDROCHLORIDE SCH MLS: 750 INJECTION, POWDER, LYOPHILIZED, FOR SOLUTION INTRAVENOUS at 21:19

## 2018-01-19 RX ADMIN — VANCOMYCIN HYDROCHLORIDE SCH MLS: 750 INJECTION, POWDER, LYOPHILIZED, FOR SOLUTION INTRAVENOUS at 09:12

## 2018-01-19 RX ADMIN — SCOPALAMINE SCH MG: 1 PATCH, EXTENDED RELEASE TRANSDERMAL at 09:11

## 2018-01-19 RX ADMIN — CEFEPIME HYDROCHLORIDE SCH MLS: 2 INJECTION, POWDER, FOR SOLUTION INTRAVENOUS at 20:09

## 2018-01-19 RX ADMIN — CEFEPIME HYDROCHLORIDE SCH MLS: 2 INJECTION, POWDER, FOR SOLUTION INTRAVENOUS at 08:30

## 2018-01-19 RX ADMIN — Medication SCH ML: at 08:31

## 2018-01-19 NOTE — PRG
DATE OF SERVICE:  01/19/2018

 

SUBJECTIVE:  Mr. Montelongo has evaluated this morning.  He is awake and alert.  
We set him up at the side of the bed.  He had copious thick secretions in his 
mouth and in his endotracheal tube.

 

PHYSICAL EXAMINATION:

VITAL SIGNS:  Heart rate 87, blood pressure 155/58, respiratory rate is in 20s.
  Intake and output is positive 643.

LUNGS:  Remarkable for coarse equal breath sounds.  Decreased breath sounds at 
his right base.

HEART:  Regular rhythm.

ABDOMEN:  Soft and nontender.

EXTREMITIES:  Without clubbing, cyanosis, or edema.

 

LABORATORY DATA:  White count 12.3, hemoglobin 9.6, and platelets 184.

 

LABORATORY:  Sodium 140, potassium 3.8, chloride 102, bicarbonate 29, BUN 36, 
creatinine 0.67.  There is no blood gas today.

 

IMPRESSION:  Respiratory failure associated with pneumonia and pleural effusion.

 

PLAN:  Thoracentesis.  Given the tenacity of his secretions, we will start his 
scopolamine patch and decrease ventilatory support.  I do not think he will do 
well and we extubated him.  At this point, I do not think he will be able to 
clear his secretions.  Discussed all the above with the wife and also received 
phone consent for thoracentesis.  Explained that risks of bleeding, infection, 
and lung collapse were present, but unlikely.  He agreed to proceed.

 

Critical care time was 30 minutes excluding procedure.

 

[facility]

## 2018-01-19 NOTE — RAD
SINGLE VIEW CHEST:

 

Date:  01/19/18

 

COMPARISON:  

01/19/18. 

 

HISTORY:  

Ventilated patient, status post right thoracocentesis. 

 

FINDINGS:

Single view of the chest shows a normal sized cardiomediastinal silhouette. The pacemaker is unchange
d in position. The endotracheal tube and NG tube re unchanged in position. The right-sided pleural ef
fusion has significantly decreased in size and there is only a trace amount of fluid in the right cos
tophrenic angle. No pneumothorax is seen. There likely is also a small left pleural effusion. 

 

IMPRESSION: 

Decreased size of right pleural effusion status post thoracocentesis without evidence of pneumothorax
. 

 

 

POS: [facility]

## 2018-01-19 NOTE — PDOC.PN
- Subjective


Encounter Start Date: 01/19/18


Encounter Start Time: 09:45


-: non-verbal





Pt lightly sedated on 40 of propofol.  opens eyes to verbal stimuli.  PT still 

anxious.  plan is to tap pleural fluid, wean.  Nursing doenst think he's quite 

ready





No F/c, no N/V/D/C, no acute overnight events.





10 point ROS not obtainable





- Objective


Resuscitation Status: 





FULL





MAR Reviewed: Yes


Vital Signs & Weight: 


 Vital Signs (12 hours)











  Temp Pulse Resp BP Pulse Ox


 


 01/19/18 12:19   87   155/58 H 


 


 01/19/18 12:00  98.7 F   26 H  


 


 01/19/18 10:00    31 H  


 


 01/19/18 09:00  99.8 F H    


 


 01/19/18 08:00    26 H  


 


 01/19/18 07:45  99.8 F H  82  25 H   94 L


 


 01/19/18 07:34   82   118/43 L 


 


 01/19/18 06:00    26 H  


 


 01/19/18 04:00  99 F   28 H  


 


 01/19/18 02:53   87   113/40 L 


 


 01/19/18 02:51   79  23 H   95


 


 01/19/18 02:00    24 H  








 Weight











Admit Weight                   158 lb


 


Weight                         150 lb 2.157 oz











 Most Recent Monitor Data











Heart Rate from ECG            85


 


NIBP                           155/58


 


NIBP BP-Mean                   100


 


Respiration from ECG           27


 


SpO2                           98














I&O: 


 











 01/18/18 01/19/18 01/20/18





 06:59 06:59 06:59


 


Intake Total 2747 2768 


 


Output Total 2220 2125 730


 


Balance 527 643 -730











Result Diagrams: 


 01/19/18 03:55





 01/19/18 03:55


Radiology Reviewed by me: Yes


EKG Reviewed by me: Yes





Phys Exam





- Physical Examination


Constitutional: NAD


HEENT: PERRLA, moist MMs, sclera anicteric, oral pharynx no lesions


oral ETT, OGT


Neck: no nodes, no JVD, supple, full ROM


Respiratory: no wheezing, no rales, no rhonchi


Cardiovascular: RRR, no significant murmur, no rub


Gastrointestinal: soft, non-tender, no distention, positive bowel sounds


Musculoskeletal: edema present


edema improved


Neurological: non-focal, normal sensation, moves all 4 limbs


Lymphatic: no nodes


Deviation from normal: lightly seate on propofol, arouses and opens eyes to 

verbal stim


Skin: no rash, normal turgor, cap refill <2 seconds





Dx/Plan


(1) Acute hypoxemic respiratory failure


Code(s): J96.01 - ACUTE RESPIRATORY FAILURE WITH HYPOXIA   Status: Acute   

Comment: intubated on vent, weaning per pulm,.  appreciate Dr Jerome's 

assistance   





(2) PNA (pneumonia)


Code(s): J18.9 - PNEUMONIA, UNSPECIFIED ORGANISM   Status: Acute   


Qualifiers: 


   Pneumonia type: due to unspecified organism   Laterality: bilateral   Lung 

location: unspecified part of lung   Qualified Code(s): J18.9 - Pneumonia, 

unspecified organism   





(3) Sepsis


Code(s): A41.9 - SEPSIS, UNSPECIFIED ORGANISM   Status: Acute   


Qualifiers: 


   Sepsis type: sepsis due to unspecified organism   Qualified Code(s): A41.9 - 

Sepsis, unspecified organism   





(4) COPD (chronic obstructive pulmonary disease)


Status: Chronic   


Qualifiers: 


   COPD type: unspecified COPD   Qualified Code(s): J44.9 - Chronic obstructive 

pulmonary disease, unspecified   





- Plan


cont current plan of care, continue antibiotics, PT/OT, respiratory therapy





* .

## 2018-01-19 NOTE — PRG
DATE OF SERVICE:  2018

 

SUBJECTIVE:  Mr. Montelongo remains intubated on the ventilator.  Some medical records did fortunately c
ome.  He has had extensive hospitalization at [facility] recently.

 

Reviewing the records it was found the followin.  He has had severe peripheral vascular disease with multiple interventions of the lower extremitie
s.

2.  History of stent implantation of his coronaries.  He had a stent 3.5 x 24 placed in his LAD, 3.0 
x 15 in the right coronary in .  The patient haschronic diastolic heart failure, paroxysmal atria
l fibrillation, and previous pacemaker insertion.  Multiple procedures have been done as outlined in 
the chart by Dr. Meehan.

 

OBJECTIVE:

VITAL SIGNS:  Blood pressure is 140/40, pulse is variable.  Earlier, he was in sinus atrial fibrillat
ion in the 70s.

LUNGS:  Clear of any wheezing.  There is rhonchi.

CARDIAC:  Irregular.

ABDOMEN:  Soft and nontender.

 

ASSESSMENT:

1.  Coronary disease, stable.

2.  Chronic congestive heart failure, diastolic.

3.  Peripheral vascular disease.

 

PLAN:

1.  Continue furosemide.

2.  Continue enoxaparin.  We will need increase doses what look like he is in atrial fibrillation monica
te a bit.

3.  Dr. Spencer available if needed this weekend.

## 2018-01-19 NOTE — RAD
SINGLE VIEW CHEST:

 

Date:  01/19/18

 

COMPARISON:  

01/18/18. 

 

HISTORY:  

Ventilated patient with respiratory failure. 

 

FINDINGS:

Single view of the chest shows an enlarged cardiomediastinal silhouette with atherosclerotic calcific
ations in the aorta. The pacemaker is unchanged in position. The endotracheal tube and NG tube are un
changed in position. Opacity seen in the right lung base which likely represents an infiltrate and ad
jacent pleural effusion. A small left pleural effusion may also be present. 

 

IMPRESSION: 

Stable exam. 

 

 

POS: [facility]

## 2018-01-19 NOTE — OP
PROCEDURE PERFORMED:  Thoracentesis.

 

:  Miguel A Jerome M.D.

 

INDICATION:  The patient was placed on just pressure support of 3.  No PEEP and no volume ventilation
, sitting on the side of the bed.

 

DESCRIPTION OF THE PROCEDURE:  His right posterior hemithorax was cleansed twice with chlorhexidine. 
 A 10 mL of 1% lidocaine was used to anesthetize the skin and localized pleural fluid using a 19-gaug
e needle.  Once fluid was localized, an 8-British safety catheter was inserted into the pleural space 
without difficulty.  One liter of clear yellow pleural fluid was evacuated.  This was sent for approp
riate studies.  Postprocedure chest radiograph showed no pneumothorax.  No air was aspirated during t
he procedure.

 

White count on the pleural fluid was 35, red count was 87, 70% lymphocytes, 5% segmented cells, pH wa
s 7.5, protein was 1.4, LDH was 68 and glucose 135.

 

IMPRESSION:

1.  Right greater than left pleural effusion secondary to diastolic heart failure.  He did not have s
ignificant proteinuria that would lead to a pleural effusion and has no history of liver disease that
 makes valvular heart disease or diastolic heart failure the most likely cause.

2.  Right lung infiltrate consistent with a pneumonia.

3.  Respiratory failure.

4.  Obesity and deconditioning.

 

Last scopolamine patch increased mucolytics and hopefully be able to extubate him in the morning.

## 2018-01-20 LAB
ANION GAP SERPL CALC-SCNC: 10 MMOL/L (ref 10–20)
BUN SERPL-MCNC: 43 MG/DL (ref 8.4–25.7)
CALCIUM SERPL-MCNC: 8.7 MG/DL (ref 7.8–10.44)
CHLORIDE SERPL-SCNC: 102 MMOL/L (ref 98–107)
CO2 SERPL-SCNC: 33 MMOL/L (ref 23–31)
CREAT CL PREDICTED SERPL C-G-VRATE: 88 ML/MIN (ref 70–130)
GLUCOSE SERPL-MCNC: 87 MG/DL (ref 83–110)
HGB BLD-MCNC: 9.3 G/DL (ref 14–18)
MCH RBC QN AUTO: 26.9 PG (ref 27–31)
MCV RBC AUTO: 89.2 FL (ref 80–94)
MDIFF COMPLETE?: YES
PLATELET # BLD AUTO: 175 THOU/UL (ref 130–400)
POTASSIUM SERPL-SCNC: 3.2 MMOL/L (ref 3.5–5.1)
RBC # BLD AUTO: 3.46 MILL/UL (ref 4.7–6.1)
SODIUM SERPL-SCNC: 142 MMOL/L (ref 136–145)
VANCOMYCIN TROUGH SERPL-MCNC: 23.7 UG/ML
WBC # BLD AUTO: 13.2 THOU/UL (ref 4.8–10.8)

## 2018-01-20 RX ADMIN — CEFEPIME HYDROCHLORIDE SCH MLS: 2 INJECTION, POWDER, FOR SOLUTION INTRAVENOUS at 21:12

## 2018-01-20 RX ADMIN — Medication SCH ML: at 21:13

## 2018-01-20 RX ADMIN — Medication SCH ML: at 08:05

## 2018-01-20 RX ADMIN — CEFEPIME HYDROCHLORIDE SCH MLS: 2 INJECTION, POWDER, FOR SOLUTION INTRAVENOUS at 08:04

## 2018-01-20 NOTE — RAD
SINGLE VIEW OF THE CHEST:

 

COMPARISON: 

1/19/18.

 

HISTORY: 

Ventilated patient with respiratory failure.

 

FINDINGS: 

A single view of the chest shows a normal-size cardiomediastinal silhouette.  The lines and tubes are
 unchanged in position.  The pacemaker is unchanged in position.  There are small bilateral pleural e
ffusions with adjacent atelectasis.  No change has occurred compared to the prior exam.

 

IMPRESSION: 

Stable exam.

 

POS: [facility]

## 2018-01-20 NOTE — PDOC.PN
- Subjective


Encounter Start Date: 01/20/18


Encounter Start Time: 09:10


-: non-verbal





Pt seen and exmained, case discussed with Dr Jerome face to face.  Sitting up in 

a chair position, still intubated.  Pn PS, but Vt very small.  high risk of re-

intubation if extubated.  Pleural effusion drained by Dr Jerome yesterday, it is 

trnasudative





no F/c, no diarrhea, no N/V, no BM in days.





ROS not obtainable





- Objective


MAR Reviewed: Yes


Vital Signs & Weight: 


 Vital Signs (12 hours)











  Temp Pulse Resp BP Pulse Ox


 


 01/20/18 11:05   97   135/40 L 


 


 01/20/18 11:02   88  24 H   92 L


 


 01/20/18 08:00  99.6 F  105 H  31 H   91 L


 


 01/20/18 06:49   97   131/43 L 


 


 01/20/18 06:48   89  28 H  


 


 01/20/18 06:00    30 H  


 


 01/20/18 04:00    29 H  


 


 01/20/18 03:51   73   


 


 01/20/18 03:00  98.6 F    


 


 01/20/18 02:00    30 H  


 


 01/20/18 00:00    27 H  








 Weight











Admit Weight                   158 lb


 


Weight                         150 lb 2.157 oz











 Most Recent Monitor Data











Heart Rate from ECG            96


 


NIBP                           135/40


 


NIBP BP-Mean                   98


 


Respiration from ECG           28


 


SpO2                           91














I&O: 


 











 01/19/18 01/20/18 01/21/18





 06:59 06:59 06:59


 


Intake Total 2768 1617 


 


Output Total 2125 2325 115


 


Balance 643 -708 -115











Result Diagrams: 


 01/20/18 04:20





 01/20/18 04:20


Radiology Reviewed by me: Yes


EKG Reviewed by me: Yes





Phys Exam





- Physical Examination


Constitutional: NAD


HEENT: PERRLA, moist MMs, sclera anicteric, oral pharynx no lesions


ETT and OGT in place


Neck: no nodes, no JVD, supple, full ROM


Respiratory: no wheezing, no rales, no rhonchi, clear to auscultation bilateral


Cardiovascular: RRR, no significant murmur, no rub


Gastrointestinal: soft, non-tender, no distention, positive bowel sounds


Musculoskeletal: pulses present, edema present


Neurological: non-focal, normal sensation, moves all 4 limbs


Lymphatic: no nodes


Skin: no rash, normal turgor, cap refill <2 seconds





Dx/Plan


(1) Acute hypoxemic respiratory failure


Code(s): J96.01 - ACUTE RESPIRATORY FAILURE WITH HYPOXIA   Status: Acute   

Comment: intubated on vent, weaning per pulm,.  appreciate Dr Jerome's 

assistance   





(2) PNA (pneumonia)


Code(s): J18.9 - PNEUMONIA, UNSPECIFIED ORGANISM   Status: Acute   


Qualifiers: 


   Pneumonia type: due to unspecified organism   Laterality: bilateral   Lung 

location: unspecified part of lung   Qualified Code(s): J18.9 - Pneumonia, 

unspecified organism   





(3) Sepsis


Code(s): A41.9 - SEPSIS, UNSPECIFIED ORGANISM   Status: Acute   


Qualifiers: 


   Sepsis type: sepsis due to unspecified organism   Qualified Code(s): A41.9 - 

Sepsis, unspecified organism   





(4) COPD (chronic obstructive pulmonary disease)


Status: Chronic   


Qualifiers: 


   COPD type: unspecified COPD   Qualified Code(s): J44.9 - Chronic obstructive 

pulmonary disease, unspecified   





- Plan


cont current plan of care, continue antibiotics, PT/OT, respiratory therapy





* .

## 2018-01-20 NOTE — PRG
DATE OF SERVICE:  01/20/2018

 

SUBJECTIVE:  Mr. Kleber Montelongo remains ventilated.  His negative inspiratory force is between -29 and 
-30, minute volume is between 8 and 9 liters a minute.  His vital signs have been stable, although wi
th his ventilator turned down, his tidal volumes tend to get quite small with pressure support breath
s.

 

OBJECTIVE:

VITAL SIGNS:  He is afebrile, respiratory rate is in the 20s, blood pressure 156/48, oximetries in th
e 90s.

LUNGS:  Remarkable for decreased breath sounds in his right base.

HEART:  Regular rhythm.

ABDOMEN:  Soft.

 

LABORATORY DATA:  White count 13.2, hemoglobin 9.3, platelets 175.  Sodium 142, potassium 3.2, chlori
de 102, bicarb 33, BUN 43, creatinine 0.68.

 

IMPRESSION:

1.  Respiratory failure associated with pneumonia.

2.  Diastolic heart failure with transudative pleural effusion that was tapped on the right yesterday
, total of a liter.

3.  Recent intubation at [facility].

4.  ? underlying obstructive lung disease.

 

I have reviewed his cultures.  He has one out of two blood culture is positive for micrococcus.  I manzanares
spect this is a contaminant.  His pleural fluid cultures are negative.

 

Chest radiograph was reviewed by me.  He still has bilateral effusions, but his right chest is improv
ed on radiograph.

 

Blood gas shows little bit of metabolic alkalosis from his diuresis.  His pH was 7.5 two days ago.  T
here was no blood gas done today for some reason.

 

IMPRESSION:  Respiratory failure.

 

DISCUSSION:  We will decrease ventilatory support today to just barely eliminate tube resistance and 
then re-evaluate him tomorrow.  I am concerned that there were thick secretions were noted yesterday,
 even though they have improved, it will be difficult to clear.  We will reassess him in the morning.


 

Critical care time, 30 minutes.

## 2018-01-21 LAB
ANION GAP SERPL CALC-SCNC: 9 MMOL/L (ref 10–20)
BUN SERPL-MCNC: 45 MG/DL (ref 8.4–25.7)
CALCIUM SERPL-MCNC: 8.3 MG/DL (ref 7.8–10.44)
CHLORIDE SERPL-SCNC: 106 MMOL/L (ref 98–107)
CO2 SERPL-SCNC: 31 MMOL/L (ref 23–31)
CREAT CL PREDICTED SERPL C-G-VRATE: 103 ML/MIN (ref 70–130)
GLUCOSE SERPL-MCNC: 106 MG/DL (ref 83–110)
HGB BLD-MCNC: 9.2 G/DL (ref 14–18)
MCH RBC QN AUTO: 26.7 PG (ref 27–31)
MCV RBC AUTO: 89.7 FL (ref 80–94)
MDIFF COMPLETE?: YES
PLATELET # BLD AUTO: 133 THOU/UL (ref 130–400)
POTASSIUM SERPL-SCNC: 3.6 MMOL/L (ref 3.5–5.1)
RBC # BLD AUTO: 3.42 MILL/UL (ref 4.7–6.1)
SODIUM SERPL-SCNC: 142 MMOL/L (ref 136–145)
VANCOMYCIN TROUGH SERPL-MCNC: 24 UG/ML
WBC # BLD AUTO: 12 THOU/UL (ref 4.8–10.8)

## 2018-01-21 RX ADMIN — Medication SCH ML: at 10:02

## 2018-01-21 RX ADMIN — CEFEPIME HYDROCHLORIDE SCH MLS: 2 INJECTION, POWDER, FOR SOLUTION INTRAVENOUS at 10:01

## 2018-01-21 RX ADMIN — Medication SCH ML: at 19:51

## 2018-01-21 RX ADMIN — CEFEPIME HYDROCHLORIDE SCH MLS: 2 INJECTION, POWDER, FOR SOLUTION INTRAVENOUS at 19:50

## 2018-01-21 NOTE — PDOC.PN
- Subjective


Encounter Start Date: 01/21/18


Encounter Start Time: 08:45


-: non-verbal





pt unchanged.  awakens on 40 of propofol.  on PSV.  Small rapid tidal volumes.  

Case discussed with Dr Jerome and nursing.





no F/C, no N/V/d/C, no acute events.





- Objective


MAR Reviewed: Yes


Vital Signs & Weight: 


 Vital Signs (12 hours)











  Temp Pulse Resp BP Pulse Ox


 


 01/21/18 12:00    26 H  


 


 01/21/18 10:52   87   151/42 H 


 


 01/21/18 10:51   99  31 H   90 L


 


 01/21/18 10:00    32 H  


 


 01/21/18 09:00  99.8 F H    


 


 01/21/18 08:00  99.8 F H  102 H  34 H   89 L


 


 01/21/18 06:53   88   151/52 H 


 


 01/21/18 06:51   93  26 H   92 L


 


 01/21/18 05:50    24 H  


 


 01/21/18 05:00  98.6 F    


 


 01/21/18 04:00    25 H  


 


 01/21/18 02:40   88   


 


 01/21/18 02:00    26 H  








 Weight











Admit Weight                   158 lb


 


Weight                         155 lb 10.342 oz











 Most Recent Monitor Data











Heart Rate from ECG            82


 


NIBP                           155/45


 


NIBP BP-Mean                   93


 


Respiration from ECG           28


 


SpO2                           90














I&O: 


 











 01/20/18 01/21/18 01/22/18





 06:59 06:59 06:59


 


Intake Total 1617 3232 180


 


Output Total 2325 2100 425


 


Balance -708 1132 -245











Result Diagrams: 


 01/21/18 04:00





 01/21/18 04:33


Radiology Reviewed by me: Yes


EKG Reviewed by me: Yes





Phys Exam





- Physical Examination


Constitutional: NAD


HEENT: PERRLA, moist MMs, sclera anicteric, oral pharynx no lesions


oral ETT and OGT in place


Neck: no nodes, no JVD, supple, full ROM


Respiratory: no wheezing, no rales, no rhonchi, clear to auscultation bilateral


Cardiovascular: RRR, no significant murmur, no rub


Gastrointestinal: soft, non-tender, no distention, positive bowel sounds


Musculoskeletal: pulses present, edema present


Neurological: non-focal, moves all 4 limbs


Lymphatic: no nodes


Skin: no rash, normal turgor, cap refill <2 seconds





Dx/Plan


(1) Acute hypoxemic respiratory failure


Code(s): J96.01 - ACUTE RESPIRATORY FAILURE WITH HYPOXIA   Status: Acute   

Comment: intubated on vent, weaning per pulm,.  appreciate Dr Jerome's 

assistance.  WOnder if he may need to be trached and weaned chronically   





(2) PNA (pneumonia)


Code(s): J18.9 - PNEUMONIA, UNSPECIFIED ORGANISM   Status: Acute   


Qualifiers: 


   Pneumonia type: due to unspecified organism   Laterality: bilateral   Lung 

location: unspecified part of lung   Qualified Code(s): J18.9 - Pneumonia, 

unspecified organism   





(3) Sepsis


Code(s): A41.9 - SEPSIS, UNSPECIFIED ORGANISM   Status: Acute   


Qualifiers: 


   Sepsis type: sepsis due to unspecified organism   Qualified Code(s): A41.9 - 

Sepsis, unspecified organism   





(4) COPD (chronic obstructive pulmonary disease)


Status: Chronic   


Qualifiers: 


   COPD type: unspecified COPD   Qualified Code(s): J44.9 - Chronic obstructive 

pulmonary disease, unspecified   





- Plan





* .

## 2018-01-21 NOTE — RAD
SINGLE VIEW OF THE CHEST:

 

COMPARISON: 

1/20/18.

 

HISTORY: 

Ventilated patient with respiratory failure.

 

FINDINGS: 

A single view of the chest shows an enlarged but stable cardiomediastinal silhouette.  The pacemaker 
is unchanged in position.  The lines and tubes are unchanged in position.  There is a veil-like opaci
ty in the left thorax which likely represents a small layering pleural effusion.  

 

IMPRESSION: 

Small left pleural effusion.

 

POS: [facility]

## 2018-01-21 NOTE — PRG
DATE OF SERVICE:  01/21/2018

 

SUBJECTIVE:  He is on 3 of PEEP and 2 of pressure support with a rate of 2.  His pressure support jose
tilation breaths revealed exhaled tidal volumes of 250 to 270 mL.

 

He has a very weak bedside exam.

 

I am turning up to a rate of 6, pressure support of 10, PEEP of 5.

 

OBJECTIVE:

LUNGS:  Clear.

HEART:  Regular rhythm.

ABDOMEN:  Soft.

VITAL SIGNS:  Heart rate 87, blood pressure 151/42, respiratory rate in the 30s.

 

LABORATORY DATA:  Sodium 142, potassium 3.6, chloride 106, bicarbonate 31, BUN 45, creatinine 0.64.  
White count 12, hemoglobin 9.2, platelets 133,000.

 

Chest radiograph reviewed by me shows significant improvement in his right lung with small left effus
ion.

 

IMPRESSION:

1.  Diastolic dysfunction with bilateral pleural effusions.

2.  Pneumonia.

3.  Weakness and deconditioning.  I am going to get a blood gas on the current ventilator settings.

 

PLAN:  We will continue slow weaning attempts, but I do not feel he is a candidate for extubation.  I
 believe he will fail within 8 to 12 if he is extubated at this time just because of his muscle weakn
ess.

 

Critical care time was 30 minutes.

## 2018-01-22 LAB
ANALYZER IN CARDIO: (no result)
ANION GAP SERPL CALC-SCNC: 12 MMOL/L (ref 10–20)
BASE EXCESS STD BLDA CALC-SCNC: 6.5 MEQ/L
BUN SERPL-MCNC: 36 MG/DL (ref 8.4–25.7)
CA-I BLDA-SCNC: 1.2 MMOL/L (ref 1.12–1.3)
CALCIUM SERPL-MCNC: 8.7 MG/DL (ref 7.8–10.44)
CHLORIDE SERPL-SCNC: 107 MMOL/L (ref 98–107)
CO2 SERPL-SCNC: 29 MMOL/L (ref 23–31)
CREAT CL PREDICTED SERPL C-G-VRATE: 104 ML/MIN (ref 70–130)
GLUCOSE SERPL-MCNC: 86 MG/DL (ref 83–110)
HCO3 BLDA-SCNC: 31.6 MEQ/L (ref 22–26)
HCT VFR BLDA CALC: 27.8 % (ref 42–52)
HGB BLD-MCNC: 8.9 G/DL (ref 14–18)
HGB BLDA-MCNC: 8.7 G/DL (ref 14–18)
MCH RBC QN AUTO: 27.7 PG (ref 27–31)
MCV RBC AUTO: 90.7 FL (ref 80–94)
MDIFF COMPLETE?: YES
O2 A-A PPRESDIFF RESPIRATORY: 123.22 MM[HG] (ref 0–20)
PCO2 BLDA: 48.7 MMHG (ref 35–45)
PH BLDA: 7.43 [PH] (ref 7.35–7.45)
PLATELET # BLD AUTO: 133 THOU/UL (ref 130–400)
PO2 BLDA: 63.7 MMHG (ref 80–100)
POTASSIUM SERPL-SCNC: 3.2 MMOL/L (ref 3.5–5.1)
RBC # BLD AUTO: 3.2 MILL/UL (ref 4.7–6.1)
SODIUM SERPL-SCNC: 145 MMOL/L (ref 136–145)
SPECIMEN DRAWN FROM PATIENT: (no result)
VANCOMYCIN TROUGH SERPL-MCNC: 18.8 UG/ML
WBC # BLD AUTO: 9.7 THOU/UL (ref 4.8–10.8)

## 2018-01-22 RX ADMIN — Medication SCH ML: at 08:16

## 2018-01-22 RX ADMIN — CEFEPIME HYDROCHLORIDE SCH MLS: 2 INJECTION, POWDER, FOR SOLUTION INTRAVENOUS at 08:58

## 2018-01-22 RX ADMIN — Medication SCH ML: at 21:29

## 2018-01-22 RX ADMIN — CEFEPIME HYDROCHLORIDE SCH MLS: 2 INJECTION, POWDER, FOR SOLUTION INTRAVENOUS at 21:29

## 2018-01-22 RX ADMIN — SCOPALAMINE SCH MG: 1 PATCH, EXTENDED RELEASE TRANSDERMAL at 08:13

## 2018-01-22 NOTE — RAD
AP VIEW OF CHEST:

 

Date:  01/22/18 

 

INDICATION:

History of intubation. 

 

COMPARISON:  

Prior exam dated 01/21/18. 

 

FINDINGS:

The patient remains intubated with associated gastric catheter. Cardiomegaly and pulmonary vascular c
ongestion persists. The extent of the central edema pattern seen from the comparison exam has improve
d. There is persistent small bilateral pleural effusion. No pneumothorax is evident. Chronic osseous 
changes are similar. Dual lead pacemaker is unchanged. 

 

IMPRESSION: 

Some improvement in the central perihilar edema. Persistent cardiomegaly, pulmonary vascular congesti
on, and small bilateral pleural effusions. Continued follow-up is recommended. 

 

 

POS: [facility]

## 2018-01-22 NOTE — PRG
DATE OF SERVICE:  01/22/2018

 

Kleber Montelongo was sitting in a chair.  His negative inspiratory force was excellent this morning.

 

PHYSICAL EXAMINATION: 

VITAL SIGNS:  His vital signs have been stable, his temperature is 99, heart rate 96, blood pressure 
153/42.  

LUNGS:  His lungs are clear.  

HEART:  Regular rhythm.

ABDOMEN:  Abdomen is soft.

 

His minute volume was less than 10 liters a minute.

 

He passed a leak test.  It was felt he was a candidate for extubation.  This has been done successful
ly, he is in no distress.  He will be kept in the Critical Care Unit.

 

LABORATORY:  Lab has been reviewed:  White count 5.7, hemoglobin 8.9, sodium 133.

 

Sodium 145, potassium 3.2.  He has been given 40 mEq of potassium, chloride 107, bicarbonate 29, BUN 
36, creatinine 0.6, pH 7.43, CO2 48, pO2 63.

 

IMPRESSION:

1.  Pneumonia.

2.  Diastolic dysfunction.

3.  Respiratory failure with 2 intubations in the last month.

 

PLAN:  We will keep in the ICU.

 

Critical care time was 30 minutes.

## 2018-01-22 NOTE — PDOC.PN
- Subjective


Encounter Start Date: 01/22/18


Encounter Start Time: 14:00


Subjective: Patient successfully extubated this AM, confused, denies complaints





- Objective


MAR Reviewed: Yes


Vital Signs & Weight: 


 Vital Signs (12 hours)











  Temp Pulse Resp BP Pulse Ox


 


 01/22/18 11:00  99.2 F    


 


 01/22/18 10:52   96  21 H   100


 


 01/22/18 08:00  99.4 F  96  21 H   97


 


 01/22/18 07:48   92   180/61 H 


 


 01/22/18 07:00  99.4 F    


 


 01/22/18 06:48   87  29 H   97


 


 01/22/18 06:00    28 H  


 


 01/22/18 04:00    32 H  


 


 01/22/18 03:00  100.0 F H    


 


 01/22/18 02:50   77   


 


 01/22/18 02:00    30 H  








 Weight











Admit Weight                   158 lb


 


Weight                         157 lb 3.033 oz











 Most Recent Monitor Data











Heart Rate from ECG            100


 


NIBP                           167/51


 


NIBP BP-Mean                   109


 


Respiration from ECG           28


 


SpO2                           97














I&O: 


 











 01/21/18 01/22/18 01/23/18





 06:59 06:59 06:59


 


Intake Total 3232 3721.7 385


 


Output Total 2100 2320 715


 


Balance 1132 1401.7 -330











Result Diagrams: 


 01/22/18 04:36





 01/22/18 04:36





Phys Exam





- Physical Examination


Constitutional: NAD


HEENT: moist MMs


Respiratory: no wheezing, no rales, no rhonchi, clear to auscultation bilateral


Cardiovascular: RRR


Gastrointestinal: soft, positive bowel sounds


Neurological: non-focal, moves all 4 limbs


Psychiatric: normal affect


Deviation from normal: oriented to person, otherwise confused





Dx/Plan


(1) Acute hypoxemic respiratory failure


Code(s): J96.01 - ACUTE RESPIRATORY FAILURE WITH HYPOXIA   Status: Acute   

Comment: successfully extubated   





(2) PNA (pneumonia)


Code(s): J18.9 - PNEUMONIA, UNSPECIFIED ORGANISM   Status: Acute   


Qualifiers: 


   Pneumonia type: due to unspecified organism   Laterality: bilateral   Lung 

location: unspecified part of lung   Qualified Code(s): J18.9 - Pneumonia, 

unspecified organism   





(3) COPD (chronic obstructive pulmonary disease)


Status: Chronic   


Qualifiers: 


   COPD type: unspecified COPD   Qualified Code(s): J44.9 - Chronic obstructive 

pulmonary disease, unspecified   





(4) Aortic stenosis, moderate


Code(s): I35.0 - NONRHEUMATIC AORTIC (VALVE) STENOSIS   Status: Chronic   





(5) Acute on chronic diastolic (congestive) heart failure


Code(s): I50.33 - ACUTE ON CHRONIC DIASTOLIC (CONGESTIVE) HEART FAILURE   Status

: Acute   





(6) Coronary artery disease


Code(s): I25.10 - ATHSCL HEART DISEASE OF NATIVE CORONARY ARTERY W/O ANG PCTRS 

  Status: Chronic   Comment: with NSTEMI due to demand ischemia   





- Plan


cont current plan of care, continue antibiotics, DVT proph w/lovenox





* .








- Discharge Day


Encounter end time: 14:20

## 2018-01-23 LAB
ALBUMIN SERPL BCG-MCNC: 2.9 G/DL (ref 3.4–4.8)
ALP SERPL-CCNC: 49 U/L (ref 40–150)
ALT SERPL W P-5'-P-CCNC: 46 U/L (ref 8–55)
ANALYZER IN CARDIO: (no result)
ANION GAP SERPL CALC-SCNC: 12 MMOL/L (ref 10–20)
ANION GAP SERPL CALC-SCNC: 17 MMOL/L (ref 10–20)
AST SERPL-CCNC: 66 U/L (ref 5–34)
BASE EXCESS STD BLDA CALC-SCNC: 3.7 MEQ/L
BASOPHILS # BLD AUTO: 0 THOU/UL (ref 0–0.2)
BASOPHILS NFR BLD AUTO: 0.2 % (ref 0–1)
BILIRUB SERPL-MCNC: 0.7 MG/DL (ref 0.2–1.2)
BUN SERPL-MCNC: 27 MG/DL (ref 8.4–25.7)
BUN SERPL-MCNC: 28 MG/DL (ref 8.4–25.7)
CA-I BLDA-SCNC: 1.3 MMOL/L (ref 1.12–1.3)
CALCIUM SERPL-MCNC: 8.5 MG/DL (ref 7.8–10.44)
CALCIUM SERPL-MCNC: 9.1 MG/DL (ref 7.8–10.44)
CHLORIDE SERPL-SCNC: 109 MMOL/L (ref 98–107)
CHLORIDE SERPL-SCNC: 111 MMOL/L (ref 98–107)
CK MB SERPL-MCNC: 4.3 NG/ML (ref 0–6.6)
CO2 SERPL-SCNC: 24 MMOL/L (ref 23–31)
CO2 SERPL-SCNC: 29 MMOL/L (ref 23–31)
CREAT CL PREDICTED SERPL C-G-VRATE: 102 ML/MIN (ref 70–130)
CREAT CL PREDICTED SERPL C-G-VRATE: 109 ML/MIN (ref 70–130)
EOSINOPHIL # BLD AUTO: 0.3 THOU/UL (ref 0–0.7)
EOSINOPHIL NFR BLD AUTO: 1.3 % (ref 0–10)
GLOBULIN SER CALC-MCNC: 2 G/DL (ref 2.4–3.5)
GLUCOSE SERPL-MCNC: 73 MG/DL (ref 83–110)
GLUCOSE SERPL-MCNC: 96 MG/DL (ref 83–110)
HCO3 BLDA-SCNC: 29.1 MEQ/L (ref 22–26)
HCT VFR BLDA CALC: 25.5 % (ref 42–52)
HGB BLD-MCNC: 9.3 G/DL (ref 14–18)
HGB BLD-MCNC: 9.6 G/DL (ref 14–18)
HGB BLDA-MCNC: 8.3 G/DL (ref 14–18)
LYMPHOCYTES # BLD: 2.3 THOU/UL (ref 1.2–3.4)
LYMPHOCYTES NFR BLD AUTO: 11.5 % (ref 21–51)
MCH RBC QN AUTO: 27.5 PG (ref 27–31)
MCH RBC QN AUTO: 27.7 PG (ref 27–31)
MCV RBC AUTO: 90.2 FL (ref 80–94)
MCV RBC AUTO: 90.7 FL (ref 80–94)
MDIFF COMPLETE?: YES
MONOCYTES # BLD AUTO: 1.1 THOU/UL (ref 0.11–0.59)
MONOCYTES NFR BLD AUTO: 5.6 % (ref 0–10)
NEUTROPHILS # BLD AUTO: 15.9 THOU/UL (ref 1.4–6.5)
NEUTROPHILS NFR BLD AUTO: 81.3 % (ref 42–75)
O2 A-A PPRESDIFF RESPIRATORY: 395.07 MM[HG] (ref 0–20)
PCO2 BLDA: 48.9 MMHG (ref 35–45)
PH BLDA: 7.39 [PH] (ref 7.35–7.45)
PLATELET # BLD AUTO: 163 THOU/UL (ref 130–400)
PLATELET # BLD AUTO: 183 THOU/UL (ref 130–400)
PO2 BLDA: 251.8 MMHG (ref 80–100)
POTASSIUM SERPL-SCNC: 3.2 MMOL/L (ref 3.5–5.1)
POTASSIUM SERPL-SCNC: 3.6 MMOL/L (ref 3.5–5.1)
RBC # BLD AUTO: 3.35 MILL/UL (ref 4.7–6.1)
RBC # BLD AUTO: 3.48 MILL/UL (ref 4.7–6.1)
SODIUM SERPL-SCNC: 147 MMOL/L (ref 136–145)
SODIUM SERPL-SCNC: 148 MMOL/L (ref 136–145)
SPECIMEN DRAWN FROM PATIENT: (no result)
TROPONIN I SERPL DL<=0.01 NG/ML-MCNC: 0.16 NG/ML (ref ?–0.03)
TROPONIN I SERPL DL<=0.01 NG/ML-MCNC: 0.35 NG/ML (ref ?–0.03)
WBC # BLD AUTO: 12.3 THOU/UL (ref 4.8–10.8)
WBC # BLD AUTO: 19.6 THOU/UL (ref 4.8–10.8)

## 2018-01-23 PROCEDURE — 0BH17EZ INSERTION OF ENDOTRACHEAL AIRWAY INTO TRACHEA, VIA NATURAL OR ARTIFICIAL OPENING: ICD-10-PCS | Performed by: INTERNAL MEDICINE

## 2018-01-23 PROCEDURE — 5A2204Z RESTORATION OF CARDIAC RHYTHM, SINGLE: ICD-10-PCS | Performed by: INTERNAL MEDICINE

## 2018-01-23 RX ADMIN — Medication SCH ML: at 09:42

## 2018-01-23 RX ADMIN — CEFEPIME HYDROCHLORIDE SCH MLS: 2 INJECTION, POWDER, FOR SOLUTION INTRAVENOUS at 21:08

## 2018-01-23 RX ADMIN — Medication SCH ML: at 20:10

## 2018-01-23 RX ADMIN — CEFEPIME HYDROCHLORIDE SCH MLS: 2 INJECTION, POWDER, FOR SOLUTION INTRAVENOUS at 09:41

## 2018-01-23 NOTE — PRG
DATE OF SERVICE:  01/23/2018

 

SUBJECTIVE:  Mr. Montelongo is intubated on the ventilator.

 

REVIEW OF SYSTEMS:  Not obtainable.

 

PHYSICAL EXAMINATION:

VITAL SIGNS:  Blood pressure 113/38, pulse is 70-80 with atrial fibrillation.

LUNGS:  Clear.

CARDIAC:  Irregular.

ABDOMEN:  Soft and nontender.

 

ASSESSMENT:

1.  Respiratory failure.

2.  Coronary artery disease.

3.  Diastolic heart failure.

4.  Atrial fibrillation.

 

PLAN:

1.  Continue intravenous furosemide.

2.  Continue antibiotics.

3.  Continue enoxaparin.

4.  Prognosis is guarded.

5.  Continue supportive care.

## 2018-01-23 NOTE — PDOC.EVN
Event Note





- Event Note


Event Note: 





Code blue note.  Code announced around 0530.  On arrival a few minutes later, 

pt had Defibrillator pad on chest.  Was in Vfib and shocked back into sinus 

tachycardia.  Pt with agonal breathing, and i reintubated him, please see 

separate note.





CXR obtained, pulm edema, wet breath sounds.  CBC, CMP, Mag, and cardiac 

profile ordered, IV fluids stopped.  Pt placed back on sedation protocol and 

electrolyte replacement for slightly low potassium





35 minutes spent at the bedside performing critical care

## 2018-01-23 NOTE — PDOC.OP
Operative Note





- Operative Note


Operative Note: 





Procedure: endotracheal intubation





Indication: agonal breathing, code blue, s/p Vfib arrest and cardioversion





Consent: implied due to emergency





: Brant carpenter M.D.





Date: 1/23/2018





Time: 0535





Description:  Code blue cervantes,d pt founf in agonal respiration post 

cardioversion.  7.5 ETT with stylet and Mac 3 blade prepared by RT.  20mg 

Etomidate and 5 Versed drawn up by nursing.  Pt bagged until SpO2 100%.  Meds 

pushed by nursing.  When effective, Mac 3 blade inserted, thick mucous seen at 

back of throat.  Under direct visualization, ETT placed, but capnograph neg and 

stomach began to inflate.  Balloon emptied and tube removed.  Rebagged form 93 

to 97% SpO2.  Second attmept made.  Vocal cords cirectly visualized and tube 

inserted to 24cm at the lips.  Capnograph instantly turned gold.  CXR verified 

good palcement, secured and connected to ventilator.  Pt charmaine procedure well





EBL: none





Complications: none apparentl

## 2018-01-23 NOTE — PDOC.PN
- Subjective


Encounter Start Date: 01/23/18


Encounter Start Time: 13:30


-: non-verbal


Subjective: Intubated, sedated





- Objective


MAR Reviewed: Yes


Vital Signs & Weight: 


 Vital Signs (12 hours)











  Temp Pulse Resp BP Pulse Ox


 


 01/23/18 11:17   77   105/29 L 


 


 01/23/18 10:00    16  


 


 01/23/18 08:00  99.4 F    


 


 01/23/18 07:23   76   136/48 L 


 


 01/23/18 07:00  99.4 F    


 


 01/23/18 04:00  98.9 F    


 


 01/23/18 03:04      96


 


 01/23/18 00:00  98.5 F    








 Weight











Admit Weight                   158 lb


 


Weight                         161 lb 9.581 oz











 Most Recent Monitor Data











Heart Rate from ECG            81


 


NIBP                           134/33


 


NIBP BP-Mean                   76


 


Respiration from ECG           21


 


SpO2                           98














I&O: 


 











 01/22/18 01/23/18 01/24/18





 06:59 06:59 06:59


 


Intake Total 3721.7 1117 


 


Output Total 2320 5635 855


 


Balance 1401.7 -948 -855











Result Diagrams: 


 01/23/18 06:51





 01/23/18 06:44





Phys Exam





- Physical Examination


intubated, sedated


HEENT: moist MMs


Respiratory: no wheezing, no rales, no rhonchi


Cardiovascular: irregular


Gastrointestinal: soft, positive bowel sounds


Musculoskeletal: no edema


Psychiatric: normal affect, A&O x 3





Dx/Plan


(1) Acute hypoxemic respiratory failure


Code(s): J96.01 - ACUTE RESPIRATORY FAILURE WITH HYPOXIA   Status: Acute   

Comment: Reintubated after code last night   





(2) PNA (pneumonia)


Code(s): J18.9 - PNEUMONIA, UNSPECIFIED ORGANISM   Status: Acute   


Qualifiers: 


   Pneumonia type: due to unspecified organism   Laterality: bilateral   Lung 

location: unspecified part of lung   Qualified Code(s): J18.9 - Pneumonia, 

unspecified organism   





(3) COPD (chronic obstructive pulmonary disease)


Status: Chronic   


Qualifiers: 


   COPD type: unspecified COPD   Qualified Code(s): J44.9 - Chronic obstructive 

pulmonary disease, unspecified   





(4) Aortic stenosis, moderate


Code(s): I35.0 - NONRHEUMATIC AORTIC (VALVE) STENOSIS   Status: Chronic   





(5) Acute on chronic diastolic (congestive) heart failure


Code(s): I50.33 - ACUTE ON CHRONIC DIASTOLIC (CONGESTIVE) HEART FAILURE   Status

: Acute   





(6) Coronary artery disease


Code(s): I25.10 - ATHSCL HEART DISEASE OF NATIVE CORONARY ARTERY W/O ANG PCTRS 

  Status: Chronic   Comment: with NSTEMI due to demand ischemia   





(7) Cardiac arrest


Code(s): I46.9 - CARDIAC ARREST, CAUSE UNSPECIFIED   Status: Acute   Comment: V-

fib overnight, converted with defibrilation   





- Plan


cont current plan of care, respiratory therapy





* .








- Discharge Day


Encounter end time: 13:45

## 2018-01-23 NOTE — PRG
DATE OF SERVICE:  01/23/2018

 

SUBJECTIVE:  Mr. Kleber Montelongo apparently had V-fib last night.  He was not in a 
lot of distress prior to the V-fib.  He was cardioverted and intubated.

 

He is mechanically ventilated.  I met with his wife and answered all of her 
questions.

 

OBJECTIVE:

VITAL SIGNS:  Have been stable today.  He has had no more ventricular 
arrhythmias.  Heart rate 79, respiratory rate 16, oximetry is 99% and blood 
pressure 151/55.

LUNGS:  Clear.

HEART:  Regular rhythm.

ABDOMEN:  Soft.

 

LABORATORY DATA:  White count 19.6, hemoglobin 9.6 and platelets 183,000.

 

Sodium 148, potassium 3.6, chloride 111, bicarb 24, BUN 27 and creatinine 0.63.

 

IMPRESSION:

1.  Diastolic dysfunction.

2.  Pulmonary edema, post-code.

3.  Pleural effusion that was transudative.

4.  Pneumonia on presentation leading to mechanical ventilation.

5.  Recent intubation at [facility].

 

PLAN:  Continue supportive care.  I met with the wife and answered all of her 
questions.  There is no acid-base disorder.  PH 7.39, CO2 of 48, pO2 of 251 
this morning.

 

CRITICAL CARE TIME:  30 minutes.

 

MAIRA

## 2018-01-23 NOTE — RAD
AP CHEST:

 

History: Intubation. 

 

Date: 1-23-18 

 

Comparison: 1-22-18

 

FINDINGS: 

Endotracheal tube is good position. There are defibrillating pads over the left mid and left lung bas
e. Intracardiac pacing leads in place. Calcification of the aorta is seen. 

 

There is a veiling density in the right lung base compatible with a right sided pleural effusion. 

 

Pulmonary vascular congestion is seen. 

 

There is extensive perihilar lung parenchymal lung space opacities concerning for pulmonary edema or 
ARDS. 

 

IMPRESSION: 

1. Intubated patient with diffuse bilateral airspace opacities. 

2. Exam is limited due to external radiopaque foreign bodies.

 

POS: [facility]

## 2018-01-24 LAB
ANALYZER IN CARDIO: (no result)
ANION GAP SERPL CALC-SCNC: 13 MMOL/L (ref 10–20)
BASE EXCESS STD BLDA CALC-SCNC: 4.3 MEQ/L
BUN SERPL-MCNC: 29 MG/DL (ref 8.4–25.7)
CA-I BLDA-SCNC: 1.2 MMOL/L (ref 1.12–1.3)
CALCIUM SERPL-MCNC: 8.7 MG/DL (ref 7.8–10.44)
CHLORIDE SERPL-SCNC: 110 MMOL/L (ref 98–107)
CO2 SERPL-SCNC: 29 MMOL/L (ref 23–31)
CREAT CL PREDICTED SERPL C-G-VRATE: 90 ML/MIN (ref 70–130)
GLUCOSE SERPL-MCNC: 67 MG/DL (ref 83–110)
HCO3 BLDA-SCNC: 27.3 MEQ/L (ref 22–26)
HCT VFR BLDA CALC: 23.8 % (ref 42–52)
HGB BLD-MCNC: 8.3 G/DL (ref 14–18)
HGB BLDA-MCNC: 8.2 G/DL (ref 14–18)
MCH RBC QN AUTO: 27.5 PG (ref 27–31)
MCV RBC AUTO: 89 FL (ref 80–94)
MDIFF COMPLETE?: YES
O2 A-A PPRESDIFF RESPIRATORY: 106.78 MM[HG] (ref 0–20)
PCO2 BLDA: 34.1 MMHG (ref 35–45)
PH BLDA: 7.52 [PH] (ref 7.35–7.45)
PLATELET # BLD AUTO: 143 THOU/UL (ref 130–400)
PO2 BLDA: 63 MMHG (ref 80–100)
POTASSIUM SERPL-SCNC: 2.6 MMOL/L (ref 3.5–5.1)
POTASSIUM SERPL-SCNC: 2.9 MMOL/L (ref 3.5–5.1)
POTASSIUM SERPL-SCNC: 3.5 MMOL/L (ref 3.5–5.1)
RBC # BLD AUTO: 3.02 MILL/UL (ref 4.7–6.1)
SODIUM SERPL-SCNC: 149 MMOL/L (ref 136–145)
SPECIMEN DRAWN FROM PATIENT: (no result)
VANCOMYCIN TROUGH SERPL-MCNC: 21.5 UG/ML
WBC # BLD AUTO: 8.8 THOU/UL (ref 4.8–10.8)

## 2018-01-24 RX ADMIN — CEFEPIME HYDROCHLORIDE SCH MLS: 2 INJECTION, POWDER, FOR SOLUTION INTRAVENOUS at 11:12

## 2018-01-24 RX ADMIN — Medication SCH ML: at 20:18

## 2018-01-24 RX ADMIN — Medication SCH ML: at 11:12

## 2018-01-24 RX ADMIN — POTASSIUM CHLORIDE PRN MLS: 2 INJECTION, SOLUTION, CONCENTRATE INTRAVENOUS at 12:08

## 2018-01-24 RX ADMIN — CEFEPIME HYDROCHLORIDE SCH MLS: 2 INJECTION, POWDER, FOR SOLUTION INTRAVENOUS at 20:17

## 2018-01-24 RX ADMIN — POTASSIUM CHLORIDE PRN MLS: 2 INJECTION, SOLUTION, CONCENTRATE INTRAVENOUS at 05:02

## 2018-01-24 NOTE — PRG
DATE OF SERVICE:  01/24/2018

 

Mr. Montelongo is intubated on the ventilator.

 

The patient did suffer a cardiac arrest yesterday after he had an episode of ventricular fibrillation
 and was successfully defibrillated.

 

The patient had agonal breathing.

 

PHYSICAL EXAMINATION: 

GENERAL:  Currently on exam he is intubated and is awake.

VITAL SIGNS:  Blood pressure 132/45, pulse is 85-98, atrial fibrillation.

LUNGS:  Clear.

CARDIAC:  Irregular, irregular.

 

ASSESSMENT:

1.  Episode of sustained ventricular tachycardia this morning.

2.  Episode of ventricular fibrillation yesterday.

3.  Anemia.

4.  Respiratory failure.

5.  Coronary artery disease.

6.  Hypokalemia, potassium 2.6.

 

PLAN:

1.  Replete potassium.

2.  Prognosis is very guarded to poor in this gentleman.  Continue supportive care for now.

3.  He also has mild to moderate aortic stenosis.

## 2018-01-24 NOTE — PRG
DATE OF SERVICE:  01/24/2018

 

SUBJECTIVE:  Kleber Montelongo remains mechanically ventilated.  He would awaken this morning, moved all h
is extremities to command.

 

OBJECTIVE:

VITAL SIGNS:  He is afebrile, heart rate is 71, blood pressure 125/44.

LUNGS:  Clear.

HEART:  Regular rhythm.

ABDOMEN:  Soft.

 

LABORATORY DATA:  White count 8.8, hemoglobin 8.3, platelets 143.

 

Sodium 149, potassium 2.6, chloride 110, bicarbonate 29, BUN 29, creatinine 0.67, pH 7.52, CO2 of 34,
 PO2 of 63.  I have turned her IMV down to 10.

 

PLAN:  We will check chest x-ray in the morning.  Awaiting cardiac disposition with given that he had
 atrial fibrillation that was not triggered by respiratory muscle fatigue or failure from what I can 
tell.  Unfortunately, he has no signs of anoxic injury.

 

Critical care time was 30 minutes.

## 2018-01-24 NOTE — PDOC.PN
- Subjective


Encounter Start Date: 01/24/18


Encounter Start Time: 09:40


-: old records requested/rev





pt is intubated , awake on vent


Patient seen and examined. No overnight events





- Objective


MAR Reviewed: Yes


Vital Signs & Weight: 


 Vital Signs (12 hours)











  Temp Pulse Resp BP Pulse Ox


 


 01/24/18 11:07   73   114/37 L 


 


 01/24/18 10:00    16  


 


 01/24/18 08:00    16  


 


 01/24/18 07:55   85   132/45 L 


 


 01/24/18 07:00  99.7 F H    


 


 01/24/18 06:00    20  


 


 01/24/18 04:00  98.8 F   16  


 


 01/24/18 03:01   80  16   100


 


 01/24/18 02:00    20  








 Weight











Admit Weight                   158 lb


 


Weight                         152 lb 5.431 oz











 Most Recent Monitor Data











Heart Rate from ECG            73


 


NIBP                           114/37


 


NIBP BP-Mean                   73


 


Respiration from ECG           16


 


SpO2                           95














I&O: 


 











 01/23/18 01/24/18 01/25/18





 06:59 06:59 06:59


 


Intake Total 1117 647 


 


Output Total 6388 5950 750


 


Balance -948 -1893 -750











Result Diagrams: 


 01/24/18 03:55





 01/24/18 09:25


Additional Labs: 


 Accuchecks











  01/24/18





  07:25


 


POC Glucose  73











Radiology Reviewed by me: Yes (chest xray)


EKG Reviewed by me: Yes (nsr)





Phys Exam





- Physical Examination


Constitutional: NAD


intubated 


HEENT: PERRLA, sclera anicteric


Neck: no JVD, supple


Respiratory: no wheezing, no rales, no rhonchi


Cardiovascular: RRR, no rub


SM+


Gastrointestinal: soft, no distention, positive bowel sounds


Musculoskeletal: no edema, pulses present


scd+, omer+


Skin: no rash, normal turgor





Dx/Plan


(1) Acute hypoxemic respiratory failure


Code(s): J96.01 - ACUTE RESPIRATORY FAILURE WITH HYPOXIA   Status: Acute   

Comment: intubated    





(2) Acute on chronic diastolic (congestive) heart failure


Code(s): I50.33 - ACUTE ON CHRONIC DIASTOLIC (CONGESTIVE) HEART FAILURE   Status

: Acute   





(3) Cardiac arrest


Code(s): I46.9 - CARDIAC ARREST, CAUSE UNSPECIFIED   Status: Acute   Comment: V-

fib , converted with defibrilation   





(4) Demand ischemia of myocardium


Code(s): I24.8 - OTHER FORMS OF ACUTE ISCHEMIC HEART DISEASE   Status: Acute   





(5) Hypokalemia


Code(s): E87.6 - HYPOKALEMIA   Status: Acute   





(6) PNA (pneumonia)


Code(s): J18.9 - PNEUMONIA, UNSPECIFIED ORGANISM   Status: Acute   


Qualifiers: 


   Pneumonia type: due to unspecified organism   Laterality: bilateral   Lung 

location: unspecified part of lung   Qualified Code(s): J18.9 - Pneumonia, 

unspecified organism   





(7) Pleural effusion


Code(s): J90 - PLEURAL EFFUSION, NOT ELSEWHERE CLASSIFIED   Status: Acute   





(8) Sepsis with acute organ dysfunction


Code(s): A41.9 - SEPSIS, UNSPECIFIED ORGANISM; R65.20 - SEVERE SEPSIS WITHOUT 

SEPTIC SHOCK   Status: Acute   





(9) Anemia, normocytic normochromic


Code(s): D64.9 - ANEMIA, UNSPECIFIED   Status: Chronic   





(10) Aortic stenosis, moderate


Code(s): I35.0 - NONRHEUMATIC AORTIC (VALVE) STENOSIS   Status: Chronic   





(11) COPD (chronic obstructive pulmonary disease)


Status: Chronic   


Qualifiers: 


   COPD type: unspecified COPD   Qualified Code(s): J44.9 - Chronic obstructive 

pulmonary disease, unspecified   





(12) Coronary artery disease


Code(s): I25.10 - ATHSCL HEART DISEASE OF NATIVE CORONARY ARTERY W/O ANG PCTRS 

  Status: Chronic   Comment: with NSTEMI due to demand ischemia   





- Plan


cont current plan of care, continue antibiotics, respiratory therapy





* vent as per pulmonary


* medication reviewed as below


* symptomatic treatment.


* continue cefepime and vancomycin


* monitor in CCU








Review of Systems





- Review of Systems


Other: 





unable to review due to intubated status





- Medications/Allergies


Allergies/Adverse Reactions: 


 Allergies











Allergy/AdvReac Type Severity Reaction Status Date / Time


 


No Known Drug Allergies Allergy   Unverified 01/15/18 03:39











Medications: 


 Current Medications





Acetaminophen (Tylenol)  500 mg PO Q6H PRN


   PRN Reason: Headache/Fever or Pain


   Last Admin: 01/22/18 02:44 Dose:  500 mg


Albuterol/Ipratropium (Duoneb)  3 ml NEB [id number] SHAUN


   Last Admin: 01/24/18 11:07 Dose:  3 ml


Bisacodyl (Dulcolax)  10 mg SD DAILYPRN PRN


   PRN Reason: Constipation


Enoxaparin Sodium (Lovenox)  60 mg SC 0900,2100 [facility]


   Last Admin: 01/24/18 11:11 Dose:  60 mg


Furosemide (Lasix)  20 mg SLOW IVP 0600,1400 [facility]


   Last Admin: 01/24/18 06:15 Dose:  20 mg


Guaifenesin (Organ-I Nr)  400 mg PO Q4H [facility]


   Last Admin: 01/24/18 11:12 Dose:  400 mg


Cefepime HCl 2 gm/ Syringe  12.5 mls @ 150 mls/hr SLOW IVP Q12HR [facility]


   Last Admin: 01/24/18 11:12 Dose:  12.5 mls


Fentanyl Citrate 2,000 mcg/ (Sodium Chloride)  100 mls @ 0 mls/hr IV INF [facility]; 

Per Protocol


   PRN Reason: Protocol


   Stop: 02/14/18 09:48


Fentanyl Citrate (Fentanyl Bolus)  250 mls @ 0 mls/hr IVPB PRN PRN; As Directed


   PRN Reason: Breakthrough pain


   Stop: 02/14/18 09:48


Vancomycin HCl 500 mg/ Sodium (Chloride)  100 mls @ 100 mls/hr IVPB 1000,2200 

[facility]


   Last Admin: 01/24/18 11:12 Dose:  100 mls


Potassium Chloride 40 meq/ (Sodium Chloride)  270 mls @ 135 mls/hr IVPB ASDIR 

PRN


   PRN Reason: FOR SERUM K+ 2.5 - 3.5


   Last Admin: 01/24/18 05:02 Dose:  270 mls


Potassium Chloride 40 meq/ (Device)  100 mls @ 50 mls/hr IVPB ASDIR PRN


   PRN Reason: FOR SERUM K+ 2.5 - 3.5


Magnesium Sulfate 1 gm/ Sodium (Chloride)  102 mls @ 102 mls/hr IV PRN PRN


   PRN Reason: MAG LEVEL 1.4 - 2.0


Magnesium Sulfate 2 gm/ Device  100 mls @ 100 mls/hr IVPB ASDIR PRN


   PRN Reason: MAGNESIUM < 1.4


Potassium Phosphate 9 mmol/ (Sodium Chloride)  103 mls @ 25.75 mls/hr IVPB 

ASDIR PRN


   PRN Reason: Phosphate 1.0-1.8


Potassium Phosphate 12 mmol/ (Sodium Chloride)  254 mls @ 63.5 mls/hr IV ASDIR 

PRN


   PRN Reason: Serum phosphate 0.5-0.9


Potassium Phosphate 15 mmol/ (Sodium Chloride)  255 mls @ 63.75 mls/hr IV ASDIR 

PRN


   PRN Reason: Serum Phos < 0.5


Lorazepam (Ativan)  2 mg SLOW IVP Q2H PRN


   PRN Reason: Anxiety to achieve Walden 2-3


   Stop: 02/14/18 09:48


   Last Admin: 01/21/18 22:15 Dose:  2 mg


Magnesium Oxide (Magnesium Oxide)  400 mg PO BIDPRN PRN


   PRN Reason: FOR SERUM MAG 1.4 - 2.0


Magnesium Oxide (Magnesium Oxide)  800 mg PO PRN PRN


   PRN Reason: FOR SERUM MAG < 1.4


Methylprednisolone Sodium Succinate (Solu-Medrol)  40 mg IVP DAILY [facility]


   Last Admin: 01/24/18 11:11 Dose:  40 mg


Miscellaneous Medication (Phos-Nak)  1 pkt PO TIDPRN PRN


   PRN Reason: FOR PHOS LEVEL 1.0 - 1.8


Miscellaneous Medication (Phos-Nak)  2 pkt PO TIDPRN PRN


   PRN Reason: FOR PHOS LEVEL 0.5 - 1.0


Morphine Sulfate (Morphine)  2 mg SLOW IVP Q2H PRN


   PRN Reason: Breakthrough pain


   Stop: 02/14/18 09:48


   Last Admin: 01/20/18 23:10 Dose:  2 mg


Pantoprazole Sodium (Protonix)  40 mg PO DAILY [facility]


Polyethylene Glycol (Miralax)  17 gm PO DAILY [facility]


   Last Admin: 01/24/18 11:11 Dose:  17 gm


Potassium Chloride (K-Dur)  40 meq PO ASDIR PRN


   PRN Reason: FOR SERUM K+  2.5 - 3.5


Potassium Chloride (Klor-Con)  40 meq PER TUBE ASDIR PRN


   PRN Reason: FOR SERUM K+ 2.5-3.5


Propofol (Diprivan)  1,000 mg IV INF PRN; Protocol


   PRN Reason: TO ACHIEVE WALDEN SCORE 2-3


   Stop: 02/14/18 09:48


   Last Admin: 01/24/18 09:00 Dose:  1,000 mg


Scopolamine (Transderm Scop)  1.5 mg TD Q3D [facility]


   Last Admin: 01/22/18 08:13 Dose:  1.5 mg


Sodium Chloride (Flush - Normal Saline)  10 ml IVF Q12HR [facility]


   Last Admin: 01/24/18 11:12 Dose:  10 ml


Sodium Chloride (Flush - Normal Saline)  10 ml IVF PRN PRN


   PRN Reason: Saline Flush

## 2018-01-25 LAB
ANION GAP SERPL CALC-SCNC: 14 MMOL/L (ref 10–20)
ANISOCYTOSIS BLD QL SMEAR: (no result) (100X)
BUN SERPL-MCNC: 30 MG/DL (ref 8.4–25.7)
CALCIUM SERPL-MCNC: 8.8 MG/DL (ref 7.8–10.44)
CHLORIDE SERPL-SCNC: 112 MMOL/L (ref 98–107)
CO2 SERPL-SCNC: 29 MMOL/L (ref 23–31)
CREAT CL PREDICTED SERPL C-G-VRATE: 86 ML/MIN (ref 70–130)
GLUCOSE SERPL-MCNC: 100 MG/DL (ref 83–110)
HGB BLD-MCNC: 7.9 G/DL (ref 14–18)
MAGNESIUM SERPL-MCNC: 2 MG/DL (ref 1.6–2.6)
MCH RBC QN AUTO: 28.5 PG (ref 27–31)
MCV RBC AUTO: 90 FL (ref 80–94)
MDIFF COMPLETE?: YES
OVALOCYTES BLD QL SMEAR: (no result) (100X)
PLATELET # BLD AUTO: 136 THOU/UL (ref 130–400)
PLATELET BLD QL SMEAR: (no result)
POTASSIUM SERPL-SCNC: 3.1 MMOL/L (ref 3.5–5.1)
POTASSIUM SERPL-SCNC: 3.8 MMOL/L (ref 3.5–5.1)
RBC # BLD AUTO: 2.78 MILL/UL (ref 4.7–6.1)
SODIUM SERPL-SCNC: 152 MMOL/L (ref 136–145)
WBC # BLD AUTO: 6.8 THOU/UL (ref 4.8–10.8)

## 2018-01-25 RX ADMIN — Medication SCH ML: at 19:53

## 2018-01-25 RX ADMIN — CEFEPIME HYDROCHLORIDE SCH MLS: 2 INJECTION, POWDER, FOR SOLUTION INTRAVENOUS at 09:19

## 2018-01-25 RX ADMIN — INSULIN LISPRO PRN UNIT: 100 INJECTION, SOLUTION INTRAVENOUS; SUBCUTANEOUS at 16:16

## 2018-01-25 RX ADMIN — VANCOMYCIN HYDROCHLORIDE SCH MLS: 1 INJECTION, SOLUTION INTRAVENOUS at 22:22

## 2018-01-25 RX ADMIN — SCOPALAMINE SCH MG: 1 PATCH, EXTENDED RELEASE TRANSDERMAL at 09:20

## 2018-01-25 RX ADMIN — CEFEPIME HYDROCHLORIDE SCH MLS: 2 INJECTION, POWDER, FOR SOLUTION INTRAVENOUS at 20:01

## 2018-01-25 RX ADMIN — PANTOPRAZOLE SODIUM SCH MG: 40 GRANULE, DELAYED RELEASE ORAL at 09:20

## 2018-01-25 RX ADMIN — Medication SCH ML: at 09:21

## 2018-01-25 NOTE — PDOC.PN
- Subjective


Encounter Start Date: 01/25/18


Encounter Start Time: 14:36


Subjective: No complaints. responding with head movement.





- Objective


MAR Reviewed: Yes


Vital Signs & Weight: 


 Vital Signs (12 hours)











  Temp Pulse Resp Pulse Ox


 


 01/25/18 14:00    16 


 


 01/25/18 12:00    16 


 


 01/25/18 11:00  97.8 F   


 


 01/25/18 09:57    16 


 


 01/25/18 07:48    16 


 


 01/25/18 07:30  98.0 F  79  16  98


 


 01/25/18 07:00  98.0 F  79  16  98


 


 01/25/18 06:00    16 


 


 01/25/18 04:00  99.1 F   


 


 01/25/18 03:50    16 


 


 01/25/18 02:23   78  19  98








 Weight











Admit Weight                   158 lb


 


Weight                         151 lb 14.4 oz











 Most Recent Monitor Data











Heart Rate from ECG            76


 


NIBP                           110/35


 


NIBP BP-Mean                   64


 


Respiration from ECG           21


 


SpO2                           100














I&O: 


 











 01/24/18 01/25/18 01/26/18





 06:59 06:59 06:59


 


Intake Total 647 1206 532.5


 


Output Total 2540 1960 419


 


Balance -1893 -754 113.5











Result Diagrams: 


 01/25/18 04:50





 01/25/18 04:50


Additional Labs: 


 Accuchecks











  01/25/18 01/25/18 01/24/18





  12:15 00:36 17:50


 


POC Glucose  112 H  97  119 H











Radiology Reviewed by me: Yes





Phys Exam





- Physical Examination


Constitutional: NAD


HEENT: PERRLA, moist MMs, sclera anicteric


Neck: supple


Respiratory: no wheezing, no rales, no rhonchi, clear to auscultation bilateral


Cardiovascular: no significant murmur, no rub, irregular


Gastrointestinal: soft, non-tender, no distention, positive bowel sounds


Musculoskeletal: no edema


Neurological: non-focal, moves all 4 limbs (Awake and following commands. )


Skin: no rash





Dx/Plan


(1) Acute hypoxemic respiratory failure


Code(s): J96.01 - ACUTE RESPIRATORY FAILURE WITH HYPOXIA   Status: Acute   


Plan: 


Continue mx





Comment: Previously extubated; Remains reintubated s/p cardiac arrest 1/23.


On minimal vent settings. 


Daily trails at weaning


Continue Vancomycin and Cefepime


Resp therapy


Daily CXR


ABG


Pulm on board, recs appreciated.    





(2) Acute on chronic diastolic (congestive) heart failure


Code(s): I50.33 - ACUTE ON CHRONIC DIASTOLIC (CONGESTIVE) HEART FAILURE   Status

: Acute   


Plan: 


On IV lasix.


Monitor I/O, daily weights.


Obtain repeat BNP/





Comment: CXR 1/25 shows worsening CHF.


On IV Lasix


Cardiology on board. Recs appreciated. 


   





(3) Cardiac arrest


Code(s): I46.9 - CARDIAC ARREST, CAUSE UNSPECIFIED   Status: Acute   


Plan: 


See problem #1.





Comment: V-fib , converted with defibrilation   





(4) Demand ischemia of myocardium


Code(s): I24.8 - OTHER FORMS OF ACUTE ISCHEMIC HEART DISEASE   Status: Acute   

Comment: Symptom Free. 2/2 Cardiac arrest. Monitor.    





(5) Hypokalemia


Code(s): E87.6 - HYPOKALEMIA   Status: Acute   


Plan: 


Being repleted. Monitor magnesium too. 


Repeat BMP today.





Comment: Repleted. 


Likely 2/2 diuretics. 


   





(6) PNA (pneumonia)


Code(s): J18.9 - PNEUMONIA, UNSPECIFIED ORGANISM   Status: Acute   


Qualifiers: 


   Pneumonia type: due to unspecified organism   Laterality: bilateral   Lung 

location: unspecified part of lung   Qualified Code(s): J18.9 - Pneumonia, 

unspecified organism   


Comment: On vancomycin and Cefepime/ Cx negative.    





(7) Sepsis with acute organ dysfunction


Code(s): A41.9 - SEPSIS, UNSPECIFIED ORGANISM; R65.20 - SEVERE SEPSIS WITHOUT 

SEPTIC SHOCK   Status: Resolved   Comment: On Vancomycin and Cefepime   





(8) Anemia, normocytic normochromic


Code(s): D64.9 - ANEMIA, UNSPECIFIED   Status: Chronic   Comment: Trending 

down. Will obtain iron panel.    





(9) COPD (chronic obstructive pulmonary disease)


Status: Chronic   


Qualifiers: 


   COPD type: unspecified COPD   Qualified Code(s): J44.9 - Chronic obstructive 

pulmonary disease, unspecified   





(10) Coronary artery disease


Code(s): I25.10 - ATHSCL HEART DISEASE OF NATIVE CORONARY ARTERY W/O ANG PCTRS 

  Status: Chronic   


Plan: 


Continue on vent. Nebs PRN.. 





Comment: with NSTEMI due to demand ischemia   





(11) Hyponatremia


Code(s): E87.1 - HYPO-OSMOLALITY AND HYPONATREMIA   Status: Acute   


Plan: 


Monitor. 





Comment: On free water flushes through feeding tube.    





- Plan


cont current plan of care, continue antibiotics, PT/OT, respiratory therapy





* .

## 2018-01-25 NOTE — RAD
AP VIEW OF CHEST:

 

Date:  01/25/18 

 

INDICATION:

Intubation. 

 

COMPARISON:  

Prior exam dated 01/23/18. 

 

FINDINGS:

There is worsening cardiomegaly, pulmonary vascular congestion, and central edema pattern. The small 
bilateral pleural effusions appear slightly more pronounced. Dual lead pacemaker is unchanged. Gastri
c catheter and ET tube are similar. Pacer pad overlying the central chest wall has been removed. No p
neumothorax is evident. 

 

IMPRESSION: 

Findings suggesting worsening CHF. Continued follow-up recommended. 

 

 

POS: [facility]

## 2018-01-25 NOTE — PRG
DATE OF SERVICE:  01/25/2018

 

SUBJECTIVE:  Mr. Montelongo did well overnight, he will awaken, he will nod.

 

OBJECTIVE:

VITAL SIGNS:  His respiratory rate is in the teens.  Heart rate is in the 70s.  
Blood pressure 102/41.

LUNGS:  Remarkable for crackles bilaterally that are faint.

HEART:  Regular rhythm.

ABDOMEN:  Soft.

 

LABORATORY DATA:  White count 6.8, hemoglobin 7.9, platelets 136.

 

Sodium 152, potassium 3.1, chloride 112, bicarb 29, BUN 30, creatinine 0.7.

 

Intake and output was negative 754.

 

IMPRESSION:

1.  Probable pneumonia on admission.

2.  V-tach and ventricular fibrillation, ?ischemia induced.

3.  Status post reintubation after doing extremely well all night until he had 
defibrillator.

4.  History of coronary artery disease? progressive disease with a culprit 
lesion triggering these acute decompensations.

 

PLAN:  Gentle diuresis and hope for cardiac catheterization in few days if he 
remains stable.

 

Critical care time 30 min
MTDD

## 2018-01-25 NOTE — PRG
DATE OF SERVICE:  01/25/2018

 

SUBJECTIVE:  Mr. Montelongo remains intubated on the ventilator.

 

The patient awakens to verbal stimuli.

 

PHYSICAL EXAMINATION:

VITAL SIGNS:  Blood pressure 127/48, pulse 80, it is atrial fibrillation.

LUNGS:  Clear anteriorly and laterally.

CARDIAC:  Irregular.

ABDOMEN:  Soft, nontender.

EXTREMITIES:  Mild edema.

 

PERTINENT LABORATORY DATA:  Sodium is 152, up from 149 yesterday.  His hemoglobin is 7.9, potassium i
s 3.1.

 

Chest x-ray shows worsening of his congestive heart failure.

 

ASSESSMENT:

1.  Coronary artery disease.

2.  Respiratory failure.

3.  Chronic obstructive pulmonary disease.

4.  Hypernatremia.

5.  Previous stent implantation in the left anterior descending and right coronary artery.

6.  Previous right carotid endarterectomy.

7.  Diastolic heart failure, worsening.

8.  Anemia.

9.  Pulmonary hypertension.

10.  Peripheral vascular disease, status post procedures.

11.  Status post ventricular fibrillation.

12.  Atrial fibrillation.

 

PLAN:

1.  Increase free water.

2.  Increase diuretics.

3.  Prognosis looking increasingly worse in this gentleman, we will discuss with the other consultant
s.

## 2018-01-26 LAB
ALBUMIN SERPL BCG-MCNC: 2.7 G/DL (ref 3.4–4.8)
ALP SERPL-CCNC: 46 U/L (ref 40–150)
ALT SERPL W P-5'-P-CCNC: 41 U/L (ref 8–55)
ANALYZER IN CARDIO: (no result)
ANION GAP SERPL CALC-SCNC: 10 MMOL/L (ref 10–20)
AST SERPL-CCNC: 26 U/L (ref 5–34)
BASE EXCESS STD BLDA CALC-SCNC: 5.3 MEQ/L
BILIRUB SERPL-MCNC: 0.4 MG/DL (ref 0.2–1.2)
BUN SERPL-MCNC: 33 MG/DL (ref 8.4–25.7)
CA-I BLDA-SCNC: 1.2 MMOL/L (ref 1.12–1.3)
CALCIUM SERPL-MCNC: 8.5 MG/DL (ref 7.8–10.44)
CHLORIDE SERPL-SCNC: 107 MMOL/L (ref 98–107)
CO2 SERPL-SCNC: 32 MMOL/L (ref 23–31)
CREAT CL PREDICTED SERPL C-G-VRATE: 83 ML/MIN (ref 70–130)
GLOBULIN SER CALC-MCNC: 1.9 G/DL (ref 2.4–3.5)
GLUCOSE SERPL-MCNC: 106 MG/DL (ref 83–110)
HCO3 BLDA-SCNC: 29.4 MEQ/L (ref 22–26)
HCT VFR BLDA CALC: 23.3 % (ref 42–52)
HGB BLD-MCNC: 7.7 G/DL (ref 14–18)
HGB BLDA-MCNC: 7.3 G/DL (ref 14–18)
IRON SERPL-MCNC: 24 UG/DL (ref 65–175)
MAGNESIUM SERPL-MCNC: 2.2 MG/DL (ref 1.6–2.6)
MCH RBC QN AUTO: 28.3 PG (ref 27–31)
MCV RBC AUTO: 90.6 FL (ref 80–94)
MDIFF COMPLETE?: YES
PCO2 BLDA: 41.2 MMHG (ref 35–45)
PH BLDA: 7.47 [PH] (ref 7.35–7.45)
PLATELET # BLD AUTO: 128 THOU/UL (ref 130–400)
PLATELET BLD QL SMEAR: (no result)
PO2 BLDA: 72.8 MMHG (ref 80–100)
POTASSIUM SERPL-SCNC: 3.7 MMOL/L (ref 3.5–5.1)
RBC # BLD AUTO: 2.72 MILL/UL (ref 4.7–6.1)
SODIUM SERPL-SCNC: 145 MMOL/L (ref 136–145)
SPECIMEN DRAWN FROM PATIENT: (no result)
UIBC SERPL-MCNC: 224 MCG/DL (ref 261–462)
VIT B12 SERPL-MCNC: 696 PG/ML (ref 211–911)
WBC # BLD AUTO: 7.2 THOU/UL (ref 4.8–10.8)

## 2018-01-26 PROCEDURE — 30233N1 TRANSFUSION OF NONAUTOLOGOUS RED BLOOD CELLS INTO PERIPHERAL VEIN, PERCUTANEOUS APPROACH: ICD-10-PCS | Performed by: INTERNAL MEDICINE

## 2018-01-26 RX ADMIN — CEFEPIME HYDROCHLORIDE SCH MLS: 2 INJECTION, POWDER, FOR SOLUTION INTRAVENOUS at 21:21

## 2018-01-26 RX ADMIN — VANCOMYCIN HYDROCHLORIDE SCH MLS: 1 INJECTION, SOLUTION INTRAVENOUS at 22:00

## 2018-01-26 RX ADMIN — CEFEPIME HYDROCHLORIDE SCH MLS: 2 INJECTION, POWDER, FOR SOLUTION INTRAVENOUS at 08:26

## 2018-01-26 RX ADMIN — Medication SCH ML: at 08:30

## 2018-01-26 RX ADMIN — PANTOPRAZOLE SODIUM SCH MG: 40 GRANULE, DELAYED RELEASE ORAL at 08:29

## 2018-01-26 RX ADMIN — Medication SCH ML: at 21:00

## 2018-01-26 NOTE — PRG
DATE OF SERVICE:  01/26/2018

 

SUBJECTIVE:  Mr. Montelongo remains intubated on the ventilator.

 

REVIEW OF SYSTEMS:  Not available.

 

PHYSICAL EXAMINATION:

VITAL SIGNS:  Blood pressure 150/50, pulse is 70-80; it is irregular.

LUNGS:  Clear.

CARDIAC:  Irregularly irregular.

ABDOMEN:  Soft, nontender.

 

LABORATORIES:  The iron levels are low at 24, ferritin 72, iron deficient.

 

IMAGING:  Chest x-ray continues to show heart failure.

 

ASSESSMENT:

1.  Congestive heart failure, diastolic, somewhat refractory.

2.  Iron-deficiency anemia.

3.  Coronary artery disease.

4.  Atrial fibrillation.

 

PLAN:

1.  We will give intravenous iron tomorrow.

2.  Consideration for heart catheterization can be given.  We would like to discuss this first with h
is wife.  The prognosis is guarded to poor in this patient.  Options appear limited.  We would like t
o discuss with her prior to going to the cath lab.  The patient continues to not do well.

## 2018-01-26 NOTE — PRG
DATE OF SERVICE:  01/26/2018

 

SUBJECTIVE:  Mr. Montelongo continues to be mechanically ventilated.  He is wide awake.  He moves all hi
s extremities.  He is in no distress.  He has had no ventricular tachycardia reported to me over 90s.


 

OBJECTIVE:

VITAL SIGNS:  Heart rate is 87, respiratory rate is 14.  His oximetry is in the 90s on mechanical jose
tilation (97).

LUNGS:  Remarkable for coarse equal breath sounds.

HEART:  Regular rhythm.  S1 and S2 are normal.

ABDOMEN:  Soft and nontender.

 

IMAGING DATA:  Chest radiograph reviewed by me shows improved edema.

 

LABORATORY DATA:  Lab work reviewed, shows white count is 7.2, hemoglobin 7.7, it was 7.9 yesterday, 
platelets of 128.  Sodium 145, potassium 3.7, chloride 107, bicarbonate 32, BUN 32, and creatinine 0.
73.

 

Given that we suspect he may have unstable coronary disease.  I will transfuse a unit red cells today
.

 

We will continue with diuresis, mechanical ventilation.  He is tentatively for cardiac catheterizatio
n on Monday if he remains stable from my discussion with Dr. Silva.

## 2018-01-26 NOTE — RAD
SEMIUPRIGHT PORTABLE CHEST 1 VIEW:

 

Date:  01/26/18 

 

HISTORY:  

77-year-old male with respiratory insufficiency. 

 

FINDINGS:

NG tube and endotracheal tubes are in satisfactory location. Left ICD. Extensive bilateral interstiti
al and alveolar edema and large pleural effusions with overall improvement from 01/25/18. Healed righ
t rib fractures. 

 

IMPRESSION: 

Improving pulmonary edema, pleural effusions, cardiomegaly, and signs of congestive heart failure. Co
ntinue short-term follow-up. 

 

 

POS: [facility]

## 2018-01-26 NOTE — PDOC.PN
- Subjective


Encounter Start Date: 01/26/18


Encounter Start Time: 11:56


Subjective: Intubated but awake. Has no complaints. 





- Objective


Resuscitation Status: 


 











Resuscitation Status           FULL:Full Resuscitation














MAR Reviewed: Yes


Vital Signs & Weight: 


 Vital Signs (12 hours)











  Temp Pulse Pulse Pulse Resp BP BP


 


 01/26/18 11:04   86     145/58 H 


 


 01/26/18 11:02   85    25 H  


 


 01/26/18 10:54  98.7 F      


 


 01/26/18 10:30    84  79    158/55 H


 


 01/26/18 10:00      29 H  


 


 01/26/18 08:00      18  


 


 01/26/18 07:48  98.5 F  75    21 H  


 


 01/26/18 06:48   75     102/33 L 


 


 01/26/18 06:47   77    17  


 


 01/26/18 06:00      16  


 


 01/26/18 04:00  98.3 F     16  


 


 01/26/18 02:52   72    16  


 


 01/26/18 02:00      16  


 


 01/26/18 00:00  98.2 F     16  














  BP Pulse Ox


 


 01/26/18 11:04  


 


 01/26/18 11:02   97


 


 01/26/18 10:54  


 


 01/26/18 10:30  152/57 H 


 


 01/26/18 10:00  


 


 01/26/18 08:00  


 


 01/26/18 07:48   99


 


 01/26/18 06:48  


 


 01/26/18 06:47   99


 


 01/26/18 06:00  


 


 01/26/18 04:00  


 


 01/26/18 02:52   100


 


 01/26/18 02:00  


 


 01/26/18 00:00  








 Weight











Admit Weight                   158 lb


 


Weight                         156 lb 4.924 oz











 Most Recent Monitor Data











Heart Rate from ECG            84


 


NIBP                           145/58


 


NIBP BP-Mean                   83


 


Respiration from ECG           24


 


SpO2                           96














I&O: 


 











 01/25/18 01/26/18 01/27/18





 06:59 06:59 06:59


 


Intake Total 1206 4770.5 532.5


 


Output Total 1960 1689 690


 


Balance -754 3081.5 -157.5











Result Diagrams: 


 01/26/18 04:23





 01/26/18 04:23


Additional Labs: 


 Accuchecks











  01/26/18 01/26/18 01/25/18





  09:38 04:52 22:22


 


POC Glucose  103  95  123 H














  01/25/18 01/25/18





  15:48 12:15


 


POC Glucose  179 H  112 H











Radiology Reviewed by me: Yes





Phys Exam





- Physical Examination


Constitutional: NAD


HEENT: PERRLA, moist MMs, sclera anicteric


Neck: no JVD, supple, full ROM


Respiratory: no wheezing, no rales, no rhonchi, clear to auscultation bilateral


Cardiovascular: RRR, no significant murmur, no rub


Gastrointestinal: soft, non-tender, no distention, positive bowel sounds


Musculoskeletal: no edema, pulses present


Neurological: non-focal (Intubated but awake and comfortable. Responding with 

head gestures), moves all 4 limbs


Skin: no rash, normal turgor





Dx/Plan


(1) Acute hypoxemic respiratory failure


Code(s): J96.01 - ACUTE RESPIRATORY FAILURE WITH HYPOXIA   Status: Acute   


Plan: 


Continue current care. 





Comment: Previously extubated; reintubated s/p cardiac arrest 1/23.


On minimal vent settings. 


Daily SBT


Continue Vancomycin and Cefepime


Continue IV steroids. 


Resp therapy


Daily CXR


Daily ABG


Pulm on board, recs appreciated.    





(2) Acute on chronic diastolic (congestive) heart failure


Code(s): I50.33 - ACUTE ON CHRONIC DIASTOLIC (CONGESTIVE) HEART FAILURE   Status

: Acute   


Plan: 


Improving CXR findings. 


Continue diuresis. 





Comment: CXR 1/26 shows improving CHF.


On IV Lasix


Cardiology on board. Recs appreciated. 


PLan to take patient for cath. 





   





(3) Cardiac arrest


Code(s): I46.9 - CARDIAC ARREST, CAUSE UNSPECIFIED   Status: Acute   


Plan: 


Continue current treatment. 


Cardiology on board. Cath planned sometime in the next few days when clinical 

picture improves. 





Comment: V-fib , converted with defibrilation   





(4) Demand ischemia of myocardium


Code(s): I24.8 - OTHER FORMS OF ACUTE ISCHEMIC HEART DISEASE   Status: Acute   


Plan: 


As above. 





Comment: Symptom Free. 2/2 Cardiac arrest. Monitor.    





(5) Hypokalemia


Code(s): E87.6 - HYPOKALEMIA   Status: Acute   


Plan: 


Improving. 





Comment: Repleted. 


Likely 2/2 diuretics. 


   





(6) PNA (pneumonia)


Code(s): J18.9 - PNEUMONIA, UNSPECIFIED ORGANISM   Status: Acute   


Qualifiers: 


   Pneumonia type: due to unspecified organism   Laterality: bilateral   Lung 

location: unspecified part of lung   Qualified Code(s): J18.9 - Pneumonia, 

unspecified organism   


Plan: 


Stable O2 requirements. 





Comment: On vancomycin and Cefepime/ Cx negative.    





(7) Sepsis with acute organ dysfunction


Code(s): A41.9 - SEPSIS, UNSPECIFIED ORGANISM; R65.20 - SEVERE SEPSIS WITHOUT 

SEPTIC SHOCK   Status: Resolved   Comment: On Vancomycin and Cefepime   





(8) Anemia, normocytic normochromic


Code(s): D64.9 - ANEMIA, UNSPECIFIED   Status: Chronic   Comment: 2/2 Iron 

deficiency. 


Starged on supplementation.


Transfuse for Hb < 7   





(9) COPD (chronic obstructive pulmonary disease)


Status: Chronic   


Qualifiers: 


   COPD type: unspecified COPD   Qualified Code(s): J44.9 - Chronic obstructive 

pulmonary disease, unspecified   


Comment: Stable O2 requirements. Not in exacerbartion. Continue RT


   





(10) Coronary artery disease


Code(s): I25.10 - ATHSCL HEART DISEASE OF NATIVE CORONARY ARTERY W/O ANG PCTRS 

  Status: Chronic   Comment: with NSTEMI due to demand ischemia.


Planned for CATH   





(11) Hypernatremia


Code(s): E87.0 - HYPEROSMOLALITY AND HYPERNATREMIA   Status: Acute   Comment: 

Improving with free water flushes through feeding tube. 


Will decrease frequency. 


   





(12) Thrombocytopenia


Code(s): D69.6 - THROMBOCYTOPENIA, UNSPECIFIED   Status: Acute   Comment: Mild, 

asymptomatic. No signs of bleeding. 


SCDs.


Monitor


   





- Plan


cont current plan of care, continue antibiotics, PT/OT, respiratory therapy





* .

## 2018-01-27 LAB
ALBUMIN SERPL BCG-MCNC: 2.8 G/DL (ref 3.4–4.8)
ALP SERPL-CCNC: 50 U/L (ref 40–150)
ALT SERPL W P-5'-P-CCNC: 42 U/L (ref 8–55)
ANALYZER IN CARDIO: (no result)
ANION GAP SERPL CALC-SCNC: 10 MMOL/L (ref 10–20)
AST SERPL-CCNC: 23 U/L (ref 5–34)
BASE EXCESS STD BLDA CALC-SCNC: 4.9 MEQ/L
BILIRUB SERPL-MCNC: 0.6 MG/DL (ref 0.2–1.2)
BUN SERPL-MCNC: 32 MG/DL (ref 8.4–25.7)
CA-I BLDA-SCNC: 1.2 MMOL/L (ref 1.12–1.3)
CALCIUM SERPL-MCNC: 8.4 MG/DL (ref 7.8–10.44)
CHLORIDE SERPL-SCNC: 104 MMOL/L (ref 98–107)
CO2 SERPL-SCNC: 32 MMOL/L (ref 23–31)
CREAT CL PREDICTED SERPL C-G-VRATE: 86 ML/MIN (ref 70–130)
GLOBULIN SER CALC-MCNC: 2 G/DL (ref 2.4–3.5)
GLUCOSE SERPL-MCNC: 81 MG/DL (ref 83–110)
HCO3 BLDA-SCNC: 29.5 MEQ/L (ref 22–26)
HCT VFR BLDA CALC: 29.7 % (ref 42–52)
HGB BLD-MCNC: 10.2 G/DL (ref 14–18)
HGB BLDA-MCNC: 9.2 G/DL (ref 14–18)
MAGNESIUM SERPL-MCNC: 2.1 MG/DL (ref 1.6–2.6)
MCH RBC QN AUTO: 28.2 PG (ref 27–31)
MCV RBC AUTO: 90.5 FL (ref 80–94)
MDIFF COMPLETE?: YES
PCO2 BLDA: 43.8 MMHG (ref 35–45)
PH BLDA: 7.45 [PH] (ref 7.35–7.45)
PLATELET # BLD AUTO: 124 THOU/UL (ref 130–400)
PLATELET BLD QL SMEAR: (no result)
PO2 BLDA: 65.6 MMHG (ref 80–100)
POTASSIUM SERPL-SCNC: 3.5 MMOL/L (ref 3.5–5.1)
RBC # BLD AUTO: 3.6 MILL/UL (ref 4.7–6.1)
SODIUM SERPL-SCNC: 142 MMOL/L (ref 136–145)
SPECIMEN DRAWN FROM PATIENT: (no result)
WBC # BLD AUTO: 7.4 THOU/UL (ref 4.8–10.8)

## 2018-01-27 RX ADMIN — CEFEPIME HYDROCHLORIDE SCH MLS: 2 INJECTION, POWDER, FOR SOLUTION INTRAVENOUS at 19:45

## 2018-01-27 RX ADMIN — Medication SCH ML: at 09:01

## 2018-01-27 RX ADMIN — PANTOPRAZOLE SODIUM SCH MG: 40 GRANULE, DELAYED RELEASE ORAL at 08:54

## 2018-01-27 RX ADMIN — Medication SCH ML: at 19:48

## 2018-01-27 RX ADMIN — CEFEPIME HYDROCHLORIDE SCH MLS: 2 INJECTION, POWDER, FOR SOLUTION INTRAVENOUS at 08:56

## 2018-01-27 RX ADMIN — VANCOMYCIN HYDROCHLORIDE SCH MLS: 1 INJECTION, SOLUTION INTRAVENOUS at 21:10

## 2018-01-27 NOTE — RAD
CHEST 1 VIEW:

 

Date:  01/27/18 

 

HISTORY:  

Ventilated patient. 

 

COMPARISON:  

Chest 1 view dated 01/26/18. 

 

FINDINGS:

Endotracheal tube tip is craniad to the mee, approximately 2.0 cm. Enteric tube tip below diaphrag
m, though out of field of view. There are layering effusions bilaterally, worse on the right. Thicken
ing of the right minor fissure. Heart size mildly enlarged. No pneumothorax. 

 

There is extrapleural density in right hemithorax, may be sequelae of layering effusion. 

 

Bibasilar opacities are present. 

 

IMPRESSION: 

No significant change in radiographic appearance of chest. 

 

 

POS: [facility]

## 2018-01-27 NOTE — PRG
DATE OF SERVICE:  01/27/2018 

 

Thirty-five minutes critical care time.

 

The patient remains intubated on mechanical ventilation.  There have been no acute changes overnight.
  

 

PHYSICAL EXAMINATION: 

VITAL SIGNS:  Temperature 98.3 with T-max of 99.9, pulse 84, blood pressure 137/49.  Total intake for
 24 hours 1719, output 2025, weight 156 pounds.

HEENT:  Unremarkable.

NECK:  Without JVD or carotid bruits.

LUNGS:  Inspiratory crackles, primarily on the right.

CARDIOVASCULAR:  S1 and S2 regular, without murmur.

ABDOMEN:  Slightly distended with hypoactive bowel sounds.

EXTREMITIES:  No clubbing, cyanosis, but he has generalized edema throughout.

 

LABORATORY DATA:  White blood cell count 7.4, hemoglobin 10, hematocrit 32.6, platelet count 124, pH 
7.45, pCO2 43, pO2 of 65 on SIMV rate 8, tidal volume 450, PEEP 5, pressure support 10, FiO2 30%.  So
dium 142, potassium 3.5, chloride 104, CO2 32, BUN 30, creatinine 0.7, glucose 81.  

 

Chest x-ray shows cardiomegaly with bilateral layering effusions.

 

ASSESSMENT:

1.  Acute respiratory failure requiring mechanical ventilation.

2.  Diastolic congestive heart failure which has been refractory.

3.  Atrial fibrillation.

4.  Coronary disease.

5.  Iron deficiency anemia.

 

PLAN:  The patient is being kept intubated in preparation for potential cardiac catheterization on Mo
nday.  I have reviewed his orders.  He is continuing antibiotic therapy for possible pneumonia.  He h
ad been receiving iron infusion for his anemia.  He continues on diuretics.  He is continuing on ente
ral tube feeds.  His prognosis is very guarded at this time.

## 2018-01-27 NOTE — PDOC.CTH
Cardiology Progress Note





- Objective


 Vital Signs











  Temp Pulse Resp BP Pulse Ox


 


 01/27/18 20:00  98.4 F   14  


 


 01/27/18 18:58   85   


 


 01/27/18 18:57      100


 


 01/27/18 17:58    19  


 


 01/27/18 16:00  98.3 F   19  


 


 01/27/18 15:41   71   126/51 L 


 


 01/27/18 15:39   73  12   100


 


 01/27/18 13:56    18  


 


 01/27/18 12:00  98.3 F   17  


 


 01/27/18 11:52   81   137/65 


 


 01/27/18 11:48   77  21 H   98


 


 01/27/18 11:00  98.0 F    


 


 01/27/18 09:51    19  








 











Admit Weight                   158 lb


 


Weight                         156 lb 15.506 oz














 











 01/26/18 01/27/18 01/28/18





 06:59 06:59 06:59


 


Intake Total 4770.5 1719.5 2521.5


 


Output Total 1689 2025 1150


 


Balance 3081.5 -305.5 1371.5














- Labs


Result Diagrams: 


 01/27/18 05:48





 01/27/18 05:48


 Troponin/CKMB











CK-MB (CK-2)  4.3 ng/mL (0-6.6)   01/23/18  06:44    


 


Troponin I  0.347 ng/mL (< 0.028)  H*  01/23/18  12:05    














- Assessment/Plan





1. S/p V fib Cardiac arrest on 01/23/18 - Converted back with CPR


2. Resp. failure with Mechanical ventilation support - 


3. Acute on Chronic diastolic HF - on Laisx IV; 


4. CAD with NSTEMI due to demand ischemia. Planned for CATH   





(1) Acute hypoxemic respiratory failure


Code(s): J96.01 - ACUTE RESPIRATORY FAILURE WITH HYPOXIA   Status: Acute   


Plan: 


No significant change in CXR


Continue current management. 





Comment: Previously extubated; reintubated s/p cardiac arrest 1/23.


On minimal vent settings. 


Daily SBT


Continue Vancomycin and Cefepime


Continue IV steroids. 


Resp therapy


Daily CXR


Daily ABG


Pulm on board, recs appreciated.    





(2) Acute on chronic diastolic (congestive) heart failure


Code(s): I50.33 - ACUTE ON CHRONIC DIASTOLIC (CONGESTIVE) HEART FAILURE   Status

: Acute   Comment: CXR 1/26 shows improving CHF. 1/27 no significant change


On IV Lasix


Cardiology on board. Recs appreciated. 


PLan to take patient for cath when stable enough/





   





(3) Cardiac arrest


Code(s): I46.9 - CARDIAC ARREST, CAUSE UNSPECIFIED   Status: Acute   


Plan: 


Resolved. 





Comment: V-fib , converted with defibrilation   





(4) Demand ischemia of myocardium


Code(s): I24.8 - OTHER FORMS OF ACUTE ISCHEMIC HEART DISEASE   Status: Acute   

Comment: Symptom Free. 2/2 Cardiac arrest. Monitor.    





(5) Hypokalemia


Code(s): E87.6 - HYPOKALEMIA   Status: Acute   Comment: Repleted. 


Likely 2/2 diuretics. 


   





(6) PNA (pneumonia)


Code(s): J18.9 - PNEUMONIA, UNSPECIFIED ORGANISM   Status: Acute   


Qualifiers: 


   Pneumonia type: due to unspecified organism   Laterality: bilateral   Lung 

location: unspecified part of lung   Qualified Code(s): J18.9 - Pneumonia, 

unspecified organism   


Comment: Septic on admission. 


On vancomycin and Cefepime/ Cx negative. 


No significant cx growths. 


f/uID recs.    





(7) Anemia, normocytic normochromic


Code(s): D64.9 - ANEMIA, UNSPECIFIED   Status: Chronic   Comment: s/p 

transfusion with PRBC. 


2/2 Iron deficiency. 


Starged on supplementation.


Transfuse for Hb < 8   





(8) COPD (chronic obstructive pulmonary disease)


Status: Chronic   


Qualifiers: 


   COPD type: unspecified COPD   Qualified Code(s): J44.9 - Chronic obstructive 

pulmonary disease, unspecified   


Comment: Stable O2 requirements. Not in exacerbartion. Continue RT


   





(9) Coronary artery disease


Code(s): I25.10 - ATHSCL HEART DISEASE OF NATIVE CORONARY ARTERY W/O ANG PCTRS 

  Status: Chronic   Comment: with NSTEMI due to demand ischemia.


Planned for CATH   





(10) Hypernatremia


Code(s): E87.0 - HYPEROSMOLALITY AND HYPERNATREMIA   Status: Acute   Comment: 

Stable. 


on free water flushes through feeding tube. 





   





(11) Thrombocytopenia


Code(s): D69.6 - THROMBOCYTOPENIA, UNSPECIFIED   Status: Acute   


Plan: 


Stable. 





Comment: Mild, asymptomatic. No signs of bleeding. 


SCDs.


Monitor


   





- Plan


cont current plan of care, continue antibiotics, PT/OT, respiratory therapy

## 2018-01-27 NOTE — PDOC.PN
- Subjective


Encounter Start Date: 01/27/18


Encounter Start Time: 13:40


Subjective: no new complaints. 


-: No acute events overnight- got transfused with PRBC. 





- Objective


Resuscitation Status: 


 











Resuscitation Status           FULL:Full Resuscitation














MAR Reviewed: Yes


Vital Signs & Weight: 


 Vital Signs (12 hours)











  Temp Pulse Resp BP Pulse Ox


 


 01/27/18 12:00  98.3 F   17  


 


 01/27/18 11:52   81   137/65 


 


 01/27/18 11:48   77  21 H   98


 


 01/27/18 11:00  98.0 F    


 


 01/27/18 09:51    19  


 


 01/27/18 08:00    18  


 


 01/27/18 07:25  98.3 F  84  16   98


 


 01/27/18 06:34   79   153/60 H 


 


 01/27/18 06:32   76  18   96


 


 01/27/18 06:00    16  


 


 01/27/18 03:04   71  22 H   100


 


 01/27/18 03:00  99.9 F H    


 


 01/27/18 02:00    14  








 Weight











Admit Weight                   158 lb


 


Weight                         156 lb 15.506 oz











 Most Recent Monitor Data











Heart Rate from ECG            77


 


NIBP                           150/44


 


NIBP BP-Mean                   69


 


Respiration from ECG           24


 


SpO2                           100














I&O: 


 











 01/26/18 01/27/18 01/28/18





 06:59 06:59 06:59


 


Intake Total 4770.5 1719.5 1592.5


 


Output Total 1689 2025 460


 


Balance 3081.5 -305.5 1132.5











Result Diagrams: 


 01/27/18 05:48





 01/27/18 05:48


Additional Labs: 


 Accuchecks











  01/27/18 01/26/18 01/26/18





  10:12 22:41 16:07


 


POC Glucose  108  89  140 H














Phys Exam





- Physical Examination


Constitutional: NAD


HEENT: PERRLA, moist MMs, sclera anicteric


Neck: no JVD, supple, full ROM


Respiratory: no wheezing, no rales, no rhonchi, clear to auscultation bilateral


Cardiovascular: RRR, no significant murmur, no rub


Gastrointestinal: soft, non-tender, no distention, positive bowel sounds


Musculoskeletal: no edema, pulses present


Neurological: non-focal, moves all 4 limbs


Intubated but able to respond to commands. 


Psychiatric: A&O x 3


Skin: no rash, normal turgor





Dx/Plan


(1) Acute hypoxemic respiratory failure


Code(s): J96.01 - ACUTE RESPIRATORY FAILURE WITH HYPOXIA   Status: Acute   


Plan: 


No significant change in CXR


Continue current management. 





Comment: Previously extubated; reintubated s/p cardiac arrest 1/23.


On minimal vent settings. 


Daily SBT


Continue Vancomycin and Cefepime


Continue IV steroids. 


Resp therapy


Daily CXR


Daily ABG


Pulm on board, recs appreciated.    





(2) Acute on chronic diastolic (congestive) heart failure


Code(s): I50.33 - ACUTE ON CHRONIC DIASTOLIC (CONGESTIVE) HEART FAILURE   Status

: Acute   Comment: CXR 1/26 shows improving CHF. 1/27 no significant change


On IV Lasix


Cardiology on board. Recs appreciated. 


PLan to take patient for cath when stable enough/





   





(3) Cardiac arrest


Code(s): I46.9 - CARDIAC ARREST, CAUSE UNSPECIFIED   Status: Acute   


Plan: 


Resolved. 





Comment: V-fib , converted with defibrilation   





(4) Demand ischemia of myocardium


Code(s): I24.8 - OTHER FORMS OF ACUTE ISCHEMIC HEART DISEASE   Status: Acute   

Comment: Symptom Free. 2/2 Cardiac arrest. Monitor.    





(5) Hypokalemia


Code(s): E87.6 - HYPOKALEMIA   Status: Acute   Comment: Repleted. 


Likely 2/2 diuretics. 


   





(6) PNA (pneumonia)


Code(s): J18.9 - PNEUMONIA, UNSPECIFIED ORGANISM   Status: Acute   


Qualifiers: 


   Pneumonia type: due to unspecified organism   Laterality: bilateral   Lung 

location: unspecified part of lung   Qualified Code(s): J18.9 - Pneumonia, 

unspecified organism   


Comment: Septic on admission. 


On vancomycin and Cefepime/ Cx negative. 


No significant cx growths. 


f/uID recs.    





(7) Anemia, normocytic normochromic


Code(s): D64.9 - ANEMIA, UNSPECIFIED   Status: Chronic   Comment: s/p 

transfusion with PRBC. 


2/2 Iron deficiency. 


Starged on supplementation.


Transfuse for Hb < 8   





(8) COPD (chronic obstructive pulmonary disease)


Status: Chronic   


Qualifiers: 


   COPD type: unspecified COPD   Qualified Code(s): J44.9 - Chronic obstructive 

pulmonary disease, unspecified   


Comment: Stable O2 requirements. Not in exacerbartion. Continue RT


   





(9) Coronary artery disease


Code(s): I25.10 - ATHSCL HEART DISEASE OF NATIVE CORONARY ARTERY W/O ANG PCTRS 

  Status: Chronic   Comment: with NSTEMI due to demand ischemia.


Planned for CATH   





(10) Hypernatremia


Code(s): E87.0 - HYPEROSMOLALITY AND HYPERNATREMIA   Status: Acute   Comment: 

Stable. 


on free water flushes through feeding tube. 





   





(11) Thrombocytopenia


Code(s): D69.6 - THROMBOCYTOPENIA, UNSPECIFIED   Status: Acute   


Plan: 


Stable. 





Comment: Mild, asymptomatic. No signs of bleeding. 


SCDs.


Monitor


   





- Plan


cont current plan of care, continue antibiotics, PT/OT, respiratory therapy





* .

## 2018-01-28 LAB
ALBUMIN SERPL BCG-MCNC: 2.9 G/DL (ref 3.4–4.8)
ALP SERPL-CCNC: 58 U/L (ref 40–150)
ALT SERPL W P-5'-P-CCNC: 57 U/L (ref 8–55)
ANALYZER IN CARDIO: (no result)
ANION GAP SERPL CALC-SCNC: 16 MMOL/L (ref 10–20)
ANISOCYTOSIS BLD QL SMEAR: (no result) (100X)
AST SERPL-CCNC: 54 U/L (ref 5–34)
BASE EXCESS STD BLDA CALC-SCNC: 5.5 MEQ/L
BILIRUB SERPL-MCNC: 0.5 MG/DL (ref 0.2–1.2)
BUN SERPL-MCNC: 32 MG/DL (ref 8.4–25.7)
CA-I BLDA-SCNC: 1.2 MMOL/L (ref 1.12–1.3)
CALCIUM SERPL-MCNC: 8.8 MG/DL (ref 7.8–10.44)
CHLORIDE SERPL-SCNC: 103 MMOL/L (ref 98–107)
CO2 SERPL-SCNC: 26 MMOL/L (ref 23–31)
CREAT CL PREDICTED SERPL C-G-VRATE: 88 ML/MIN (ref 70–130)
GLOBULIN SER CALC-MCNC: 2.5 G/DL (ref 2.4–3.5)
GLUCOSE SERPL-MCNC: 79 MG/DL (ref 83–110)
HCO3 BLDA-SCNC: 29.7 MEQ/L (ref 22–26)
HCT VFR BLDA CALC: 32.3 % (ref 42–52)
HGB BLD-MCNC: 11.1 G/DL (ref 14–18)
HGB BLDA-MCNC: 9.7 G/DL (ref 14–18)
MAGNESIUM SERPL-MCNC: 2.4 MG/DL (ref 1.6–2.6)
MCH RBC QN AUTO: 28.2 PG (ref 27–31)
MCV RBC AUTO: 90.2 FL (ref 80–94)
MDIFF COMPLETE?: YES
PCO2 BLDA: 41.6 MMHG (ref 35–45)
PH BLDA: 7.47 [PH] (ref 7.35–7.45)
PLATELET # BLD AUTO: 120 THOU/UL (ref 130–400)
PLATELET BLD QL SMEAR: (no result)
PO2 BLDA: 59.9 MMHG (ref 80–100)
POLYCHROMASIA BLD QL SMEAR: (no result) (100X)
POTASSIUM SERPL-SCNC: 4 MMOL/L (ref 3.5–5.1)
RBC # BLD AUTO: 3.92 MILL/UL (ref 4.7–6.1)
SODIUM SERPL-SCNC: 141 MMOL/L (ref 136–145)
SPECIMEN DRAWN FROM PATIENT: (no result)
VANCOMYCIN TROUGH SERPL-MCNC: 19.2 UG/ML
WBC # BLD AUTO: 9.9 THOU/UL (ref 4.8–10.8)

## 2018-01-28 RX ADMIN — VANCOMYCIN HYDROCHLORIDE SCH MLS: 1 INJECTION, SOLUTION INTRAVENOUS at 23:04

## 2018-01-28 RX ADMIN — Medication SCH ML: at 08:51

## 2018-01-28 RX ADMIN — CEFEPIME HYDROCHLORIDE SCH MLS: 2 INJECTION, POWDER, FOR SOLUTION INTRAVENOUS at 20:32

## 2018-01-28 RX ADMIN — PANTOPRAZOLE SODIUM SCH MG: 40 GRANULE, DELAYED RELEASE ORAL at 08:50

## 2018-01-28 RX ADMIN — CEFEPIME HYDROCHLORIDE SCH MLS: 2 INJECTION, POWDER, FOR SOLUTION INTRAVENOUS at 09:00

## 2018-01-28 RX ADMIN — Medication SCH ML: at 20:29

## 2018-01-28 RX ADMIN — SCOPALAMINE SCH MG: 1 PATCH, EXTENDED RELEASE TRANSDERMAL at 08:51

## 2018-01-28 NOTE — PDOC.PN
- Subjective


Encounter Start Date: 01/28/18


Encounter Start Time: 14:02


Subjective: No acute events overnight.


-: oxygen requirements unchanged. 





- Objective


Resuscitation Status: 


 











Resuscitation Status           FULL:Full Resuscitation














MAR Reviewed: Yes


Vital Signs & Weight: 


 Vital Signs (12 hours)











  Temp Pulse Resp BP Pulse Ox


 


 01/28/18 12:00  98.5 F   19  


 


 01/28/18 11:33   89   173/41 H 


 


 01/28/18 11:25   87  24 H   94 L


 


 01/28/18 10:00    20  


 


 01/28/18 08:00  98.6 F   21 H  


 


 01/28/18 07:46   77   175/62 H 


 


 01/28/18 07:45   84  21 H   96


 


 01/28/18 07:09  98.6 F  82  18   96


 


 01/28/18 06:00    21 H  


 


 01/28/18 04:00    18  


 


 01/28/18 03:37   77   








 Weight











Admit Weight                   158 lb


 


Weight                         156 lb 15.506 oz











 Most Recent Monitor Data











Heart Rate from ECG            76


 


NIBP                           135/42


 


NIBP BP-Mean                   70


 


Respiration from ECG           19


 


SpO2                           99














I&O: 


 











 01/27/18 01/28/18 01/29/18





 06:59 06:59 06:59


 


Intake Total 1719.5 3777.5 220


 


Output Total 2025 1670 1046


 


Balance -305.5 2107.5 -826











Result Diagrams: 


 01/28/18 06:46





 01/28/18 06:46


Additional Labs: 


 Accuchecks











  01/28/18 01/28/18 01/27/18





  10:26 06:51 22:37


 


POC Glucose  88  82  119 H














  01/27/18





  16:15


 


POC Glucose  140 H











Radiology Reviewed by me: Yes





Phys Exam





- Physical Examination


Constitutional: NAD


HEENT: PERRLA, moist MMs, sclera anicteric


Neck: supple, full ROM


Respiratory: no wheezing, no rales, no rhonchi, clear to auscultation bilateral


Cardiovascular: RRR, no significant murmur, no rub


Gastrointestinal: soft, non-tender, no distention, positive bowel sounds


Musculoskeletal: no edema, pulses present


Neurological: moves all 4 limbs


Intubated but able to follow commands. 


Skin: no rash, normal turgor





Dx/Plan


(1) Acute hypoxemic respiratory failure


Code(s): J96.01 - ACUTE RESPIRATORY FAILURE WITH HYPOXIA   Status: Acute   

Comment: Stable. 


Previously extubated; reintubated s/p cardiac arrest 1/23.


On minimal vent settings. 


Daily SBT


Continue Vancomycin and Cefepime


Continue IV steroids. 


Resp therapy


Daily CXR


Daily ABG


Pulm on board, recs appreciated.    





(2) Acute on chronic diastolic (congestive) heart failure


Code(s): I50.33 - ACUTE ON CHRONIC DIASTOLIC (CONGESTIVE) HEART FAILURE   Status

: Acute   Comment: Improving with diuresis


Cardiology on board. Recs appreciated. 


Scheduled to undergo cath 1/29





   





(3) Cardiac arrest


Code(s): I46.9 - CARDIAC ARREST, CAUSE UNSPECIFIED   Status: Resolved   Comment

: V-fib , converted with defibrilation   





(4) Demand ischemia of myocardium


Code(s): I24.8 - OTHER FORMS OF ACUTE ISCHEMIC HEART DISEASE   Status: Resolved

   Comment: Symptom Free. 2/2 Cardiac arrest. Monitor.    





(5) Hypokalemia


Code(s): E87.6 - HYPOKALEMIA   Status: Resolved   Comment: Repleted. 


Likely 2/2 diuretics. 


   





(6) PNA (pneumonia)


Code(s): J18.9 - PNEUMONIA, UNSPECIFIED ORGANISM   Status: Acute   


Qualifiers: 


   Pneumonia type: due to unspecified organism   Laterality: bilateral   Lung 

location: unspecified part of lung   Qualified Code(s): J18.9 - Pneumonia, 

unspecified organism   


Comment: Septic on admission. 


On vancomycin and Cefepime/ Cx negative. 


No significant cx growths. 


f/uID recs.    





(7) Anemia, normocytic normochromic


Code(s): D64.9 - ANEMIA, UNSPECIFIED   Status: Chronic   


Plan: 


Stable Hb. Continue Mx.





Comment: s/p transfusion with PRBC. 


2/2 Iron deficiency. 


Starged on supplementation.


Transfuse for Hb < 8   





(8) COPD (chronic obstructive pulmonary disease)


Status: Chronic   


Qualifiers: 


   COPD type: unspecified COPD   Qualified Code(s): J44.9 - Chronic obstructive 

pulmonary disease, unspecified   


Comment: Stable O2 requirements. Not in exacerbartion. Continue RT


   





(9) Coronary artery disease


Code(s): I25.10 - ATHSCL HEART DISEASE OF NATIVE CORONARY ARTERY W/O ANG PCTRS 

  Status: Chronic   Comment: with NSTEMI due to demand ischemia.


Planned for CATH   





(10) Hypernatremia


Code(s): E87.0 - HYPEROSMOLALITY AND HYPERNATREMIA   Status: Resolved   Comment

: Stable. 


on free water flushes through feeding tube. 





   





(11) Thrombocytopenia


Code(s): D69.6 - THROMBOCYTOPENIA, UNSPECIFIED   Status: Acute   


Plan: 


Monitor. 





Comment: Mild, asymptomatic. No signs of bleeding. 


SCDs.


Monitor


   





- Plan





* .

## 2018-01-28 NOTE — PRG
DATE OF SERVICE:  01/28/2018

 

Thirty-five minutes critical care time.

 

SUBJECTIVE:  The patient remains intubated on mechanical ventilation.  He will wake up and follow com
mands.

 

PHYSICAL EXAMINATION:

VITAL SIGNS:  His temperature is 98.6, pulse 84, blood pressure 120/42, a 24-hour intake 3777, output
 1670, and weight 156 pounds.

HEENT:  Unremarkable.

NECK:  No JVD.

LUNGS:  Coarse breath sounds.

CARDIAC:  S1 and S2 regular.

ABDOMEN:  Soft.

EXTREMITIES:  No edema.

 

LABORATORY DATA:  White blood cell count 9.9, hematocrit 35.3, platelet count 120.  PH 7.47, pCO2 of 
41, pO2 of 59 on SIMV rate 8, tidal volume 450, PEEP 5, pressure support 10, FiO2 30%.  Sodium 141, p
otassium 4, chloride 103, CO2 of 26, BUN 32, creatinine 0.7, glucose 79.  Chest x-ray shows no signif
icant change.

 

ASSESSMENT:

1.  Acute respiratory requiring mechanical ventilation.

2.  Diastolic congestive heart failure which has been refractory.

3.  Atrial fibrillation.

4.  Coronary artery disease.

 

PLAN:  Continue mechanical ventilation in preparation for cardiac catheterization tomorrow.  I do not
 see any new orders that need to be written at this time.

## 2018-01-28 NOTE — RAD
CHEST 1 VIEW:

 

Date:  01/28/18 

 

HISTORY:  

Ventilated patient. 

 

COMPARISON:  

Chest 1 view from prior day. 

 

FINDINGS:

The patient is intubated with endotracheal tube tip craniad to the mee 2.0 cm. Small effusions. 
r space opacity is present in both lower lobes. 

 

Enteric tube tip below diaphragm, although out of field of view. 

 

IMPRESSION: 

No significant change. 

 

 

POS: [facility]

## 2018-01-29 LAB
ALBUMIN SERPL BCG-MCNC: 2.9 G/DL (ref 3.4–4.8)
ALP SERPL-CCNC: 65 U/L (ref 40–150)
ALT SERPL W P-5'-P-CCNC: 57 U/L (ref 8–55)
ANALYZER IN CARDIO: (no result)
ANION GAP SERPL CALC-SCNC: 13 MMOL/L (ref 10–20)
ANISOCYTOSIS BLD QL SMEAR: (no result) (100X)
AST SERPL-CCNC: 45 U/L (ref 5–34)
BASE EXCESS STD BLDA CALC-SCNC: 6.4 MEQ/L
BILIRUB SERPL-MCNC: 0.7 MG/DL (ref 0.2–1.2)
BUN SERPL-MCNC: 30 MG/DL (ref 8.4–25.7)
CA-I BLDA-SCNC: 1.2 MMOL/L (ref 1.12–1.3)
CALCIUM SERPL-MCNC: 8.8 MG/DL (ref 7.8–10.44)
CHLORIDE SERPL-SCNC: 100 MMOL/L (ref 98–107)
CO2 SERPL-SCNC: 28 MMOL/L (ref 23–31)
CREAT CL PREDICTED SERPL C-G-VRATE: 88 ML/MIN (ref 70–130)
GLOBULIN SER CALC-MCNC: 2.4 G/DL (ref 2.4–3.5)
GLUCOSE SERPL-MCNC: 78 MG/DL (ref 83–110)
HCO3 BLDA-SCNC: 30.4 MEQ/L (ref 22–26)
HCT VFR BLDA CALC: 35.4 % (ref 42–52)
HGB BLD-MCNC: 11.7 G/DL (ref 14–18)
HGB BLDA-MCNC: 10.4 G/DL (ref 14–18)
MAGNESIUM SERPL-MCNC: 2.3 MG/DL (ref 1.6–2.6)
MCH RBC QN AUTO: 27.4 PG (ref 27–31)
MCV RBC AUTO: 90.5 FL (ref 80–94)
MDIFF COMPLETE?: YES
O2 A-A PPRESDIFF RESPIRATORY: 107 MM[HG] (ref 0–20)
PCO2 BLDA: 41.2 MMHG (ref 35–45)
PH BLDA: 7.49 [PH] (ref 7.35–7.45)
PLATELET # BLD AUTO: 111 THOU/UL (ref 130–400)
PLATELET BLD QL SMEAR: (no result)
PO2 BLDA: 55.4 MMHG (ref 80–100)
POTASSIUM SERPL-SCNC: 4.3 MMOL/L (ref 3.5–5.1)
RBC # BLD AUTO: 4.26 MILL/UL (ref 4.7–6.1)
SODIUM SERPL-SCNC: 137 MMOL/L (ref 136–145)
SPECIMEN DRAWN FROM PATIENT: (no result)
WBC # BLD AUTO: 8.7 THOU/UL (ref 4.8–10.8)

## 2018-01-29 RX ADMIN — PANTOPRAZOLE SODIUM SCH MG: 40 GRANULE, DELAYED RELEASE ORAL at 08:54

## 2018-01-29 RX ADMIN — CEFEPIME HYDROCHLORIDE SCH MLS: 2 INJECTION, POWDER, FOR SOLUTION INTRAVENOUS at 08:56

## 2018-01-29 RX ADMIN — VANCOMYCIN HYDROCHLORIDE SCH MLS: 1 INJECTION, SOLUTION INTRAVENOUS at 21:34

## 2018-01-29 RX ADMIN — CEFEPIME HYDROCHLORIDE SCH MLS: 2 INJECTION, POWDER, FOR SOLUTION INTRAVENOUS at 20:03

## 2018-01-29 RX ADMIN — Medication SCH ML: at 08:55

## 2018-01-29 RX ADMIN — Medication SCH ML: at 20:05

## 2018-01-29 NOTE — RAD
PORTABLE CHEST 1 VIEW:

 

DATE: 1/29/18.

TIME: 5:13 a.m.

 

HISTORY: 

Respiratory failure.

 

FINDINGS: 

Comparison is made of previous day.  Endotracheal and nasogastric tubes remain in place.  A left-side
d pacing device is again seen.  The heart size is stable.  There is pulmonary vascular congestion wit
h bilateral pleural effusions and left lower lobe consolidation/atelectatic changes.

 

POS: [facility]

## 2018-01-29 NOTE — PDOC.PN
- Subjective


Encounter Start Date: 01/29/18


Encounter Start Time: 11:01


Subjective: No new complaints.


-: Status remains unchanged overnight.


-: Cardiology on board- plan for cath





- Objective


Resuscitation Status: 


 











Resuscitation Status           FULL:Full Resuscitation














MAR Reviewed: Yes


Vital Signs & Weight: 


 Vital Signs (12 hours)











  Temp Pulse Resp Pulse Ox


 


 01/29/18 10:17   77  


 


 01/29/18 08:00  98.6 F   17 


 


 01/29/18 07:10   80  


 


 01/29/18 06:00    24 H 


 


 01/29/18 04:00  98.9 F   24 H 


 


 01/29/18 02:41   82  


 


 01/29/18 02:40   74  27 H  100


 


 01/29/18 02:00    24 H 


 


 01/29/18 00:00  98.0 F   22 H 








 Weight











Admit Weight                   158 lb


 


Weight                         158 lb 11.725 oz











 Most Recent Monitor Data











Heart Rate from ECG            97


 


NIBP                           119/54


 


NIBP BP-Mean                   77


 


Respiration from ECG           18


 


SpO2                           97














I&O: 


 











 01/28/18 01/29/18 01/30/18





 06:59 06:59 06:59


 


Intake Total 3777.5 2384.7 


 


Output Total 1670 2548 1325


 


Balance 2107.5 -163.3 -1325











Result Diagrams: 


 01/29/18 05:25





 01/29/18 05:25


Additional Labs: 


 Accuchecks











  01/28/18 01/28/18





  21:54 16:25


 


POC Glucose  117 H  128 H














Phys Exam





- Physical Examination


HEENT: PERRLA, moist MMs, sclera anicteric


Neck: no JVD, supple


Respiratory: no wheezing, no rales, no rhonchi, clear to auscultation bilateral


with reduced BS globally. 


Cardiovascular: RRR, no significant murmur, no rub


Gastrointestinal: soft, non-tender, no distention, positive bowel sounds


Musculoskeletal: no edema, pulses present


Intubated but awake. Able to respond to commands and move extremities 





Dx/Plan


(1) Acute hypoxemic respiratory failure


Code(s): J96.01 - ACUTE RESPIRATORY FAILURE WITH HYPOXIA   Status: Acute   


Plan: 


Continue mx





Comment: Stable. 


Previously extubated; reintubated s/p cardiac arrest 1/23.


On minimal vent settings. 


Daily SBT


Continue Vancomycin and Cefepime


Continue IV steroids. 


Resp therapy


Daily CXR


Daily ABG


Pulm on board, recs appreciated.    





(2) Acute on chronic diastolic (congestive) heart failure


Code(s): I50.33 - ACUTE ON CHRONIC DIASTOLIC (CONGESTIVE) HEART FAILURE   Status

: Acute   


Plan: 


Continue mx





Comment: Improving with diuresis


Cardiology on board. Recs appreciated. 


Scheduled to undergo cath 1/29





   





(3) Demand ischemia of myocardium


Code(s): I24.8 - OTHER FORMS OF ACUTE ISCHEMIC HEART DISEASE   Status: Resolved

   


Plan: 


Cardiology on board. 


Would like to discuss with patient's spouse to explain risk of proceeding with 

catheterization





Comment: Symptom Free. 2/2 Cardiac arrest. 


Cardiology on board. 


Would like to discuss with patient's spouse to explain risk of proceeding with 

catheterization


   





(4) PNA (pneumonia)


Code(s): J18.9 - PNEUMONIA, UNSPECIFIED ORGANISM   Status: Acute   


Qualifiers: 


   Pneumonia type: due to unspecified organism   Laterality: bilateral   Lung 

location: unspecified part of lung   Qualified Code(s): J18.9 - Pneumonia, 

unspecified organism   


Comment: Septic on admission. 


On vancomycin and Cefepime/ Cx negative. 


No significant cx growths. 


f/uID recs.    





(5) Anemia, normocytic normochromic


Code(s): D64.9 - ANEMIA, UNSPECIFIED   Status: Chronic   Comment: s/p 

transfusion with PRBC. 


2/2 Iron deficiency. 


Starged on supplementation.


Transfuse for Hb < 8   





(6) COPD (chronic obstructive pulmonary disease)


Status: Chronic   


Qualifiers: 


   COPD type: unspecified COPD   Qualified Code(s): J44.9 - Chronic obstructive 

pulmonary disease, unspecified   


Comment: Stable O2 requirements. Not in exacerbartion. Continue RT


   





(7) Coronary artery disease


Code(s): I25.10 - ATHSCL HEART DISEASE OF NATIVE CORONARY ARTERY W/O ANG PCTRS 

  Status: Chronic   Comment: with NSTEMI due to demand ischemia.


Planned for CATH. Cardiology on board. 


Would like to discuss with patient's spouse to explain risk of proceeding with 

catheterization


   





(8) Hypernatremia


Code(s): E87.0 - HYPEROSMOLALITY AND HYPERNATREMIA   Status: Resolved   Comment

: Stable. 


on free water flushes through feeding tube. 





   





(9) Thrombocytopenia


Code(s): D69.6 - THROMBOCYTOPENIA, UNSPECIFIED   Status: Acute   Comment: Mild, 

asymptomatic. No signs of bleeding. 


SCDs.


Monitor


   





(10) Cardiac arrest


Code(s): I46.9 - CARDIAC ARREST, CAUSE UNSPECIFIED   Status: Resolved   Comment

: V-fib , converted with defibrilation   





(11) Hypokalemia


Code(s): E87.6 - HYPOKALEMIA   Status: Resolved   Comment: Repleted. 


Likely 2/2 diuretics. 


   





- Plan


cont current plan of care, continue antibiotics, PT/OT, respiratory therapy, 

DVT proph w/SCDs





* .

## 2018-01-29 NOTE — PRG
DATE OF SERVICE:  01/29/2018

 

Mr. Montelongo as outlined in my previous notes continues to not do well.  I discussed the patient with 
the patient's wife over the phone and explained to her the cardiac catheterization could be done due 
to his continued poor response to medications for heart failure.  His prognosis is guarded to poor.  
She understands the risk of cardiac catheterization is being increased based on his overall clinical 
status, but the patient does not seem to be doing well with the current regimen.  I discussed risks o
f stroke, heart attack, iodine allergy, loss of blood supply to the leg or kidney.  She understands t
here is some chance he will not survive this hospitalization.  She understands and wishes to proceed.

## 2018-01-29 NOTE — PRG
DATE OF SERVICE:  01/29/2018

 

Jayda remains mechanically ventilated.  He is tentatively on a cardiac catheterization schedule for
 tomorrow.

 

OBJECTIVE:

VITAL SIGNS:  His heart rate 84, respiratory rate is per mechanical ventilation.  Blood pressure 117/
41.

LUNGS:  Coarse equal breath sounds.

HEART:  Regular rhythm.

ABDOMEN:  Soft.

 

LABORATORY STUDIES:  White count 8.7, hemoglobin 11.7, platelets 111.

 

Sodium 137, potassium 4.3, chloride 100, bicarbonate 28, BUN 30, creatinine 0.71.

 

IMPRESSION:

1.  Respiratory failure, presumably secondary initially to pneumonia.

2.  ? coronary ischemia leading to ventricular tachycardia, ventricular fibrillation and reintubation
.

3.  ? component of pulmonary edema, noncardiogenic versus cardiogenic.

4.  Recent hospitalization with respiratory failure that resolved quickly again bringing up the quest
ion of coronary artery disease and ischemia leading to decompensation.

5.  Diastolic heart failure.

6.  History of atrial fibrillation.

 

PLAN:  Cardiac catheterization and then hopefully weaning and extubation after that.  I am not sure sandra
e would be a candidate for surgical intervention and we certainly could not really assess that withou
t pulmonary function testing or functional bedside testing.  Hopefully, something that can be fixed p
ercutaneously will be identified tomorrow.

## 2018-01-29 NOTE — PRG
DATE OF SERVICE:  01/29/2018

 

SUBJECTIVE:  Mr. Montelongo remains intubated on the ventilator.

 

OBJECTIVE:

VITAL SIGNS:  Blood pressure is 120 systolic.  Pulse is 70-80; it is irregular.

LUNGS:  Clear.

CARDIAC:  Irregularly irregular.

ABDOMEN:  Soft, nontender.

 

PERTINENT LABORATORY:  His creatinine is 0.7, hemoglobin is 11.1.

 

ASSESSMENT:

1.  Congestive heart failure, persistent, diastolic.

2.  Chronic obstructive pulmonary disease.

3.  Coronary artery disease.

 

PLAN:  I have discussed the possibility of cardiac catheterization with the patient's physicians.  I 
would like to talk to the patient's wife.  The patient is high risk of a poor outcome regardless of t
herapy and I need to make sure she understands that.  If she is agreeable, could proceed with cardiac
 catheterization tomorrow, but with the understanding that the risk is increased in view of his persi
stent poor clinical response to medical therapy for heart failure.

## 2018-01-30 LAB
ALBUMIN SERPL BCG-MCNC: 2.9 G/DL (ref 3.4–4.8)
ALP SERPL-CCNC: 62 U/L (ref 40–150)
ALT SERPL W P-5'-P-CCNC: 66 U/L (ref 8–55)
ANION GAP SERPL CALC-SCNC: 12 MMOL/L (ref 10–20)
AST SERPL-CCNC: 48 U/L (ref 5–34)
BASOPHILS # BLD AUTO: 0.1 THOU/UL (ref 0–0.2)
BASOPHILS NFR BLD AUTO: 0.7 % (ref 0–1)
BILIRUB SERPL-MCNC: 0.6 MG/DL (ref 0.2–1.2)
BUN SERPL-MCNC: 27 MG/DL (ref 8.4–25.7)
CALCIUM SERPL-MCNC: 9 MG/DL (ref 7.8–10.44)
CHLORIDE SERPL-SCNC: 100 MMOL/L (ref 98–107)
CO2 SERPL-SCNC: 30 MMOL/L (ref 23–31)
CREAT CL PREDICTED SERPL C-G-VRATE: 94 ML/MIN (ref 70–130)
EOSINOPHIL # BLD AUTO: 0.1 THOU/UL (ref 0–0.7)
EOSINOPHIL NFR BLD AUTO: 1.5 % (ref 0–10)
GLOBULIN SER CALC-MCNC: 2.1 G/DL (ref 2.4–3.5)
GLUCOSE SERPL-MCNC: 69 MG/DL (ref 83–110)
HCT VFR BLDA CALC: (no result) % (ref 42–52)
HGB BLD-MCNC: 10.3 G/DL (ref 14–18)
LYMPHOCYTES # BLD: 1.5 THOU/UL (ref 1.2–3.4)
LYMPHOCYTES NFR BLD AUTO: 19.3 % (ref 21–51)
MAGNESIUM SERPL-MCNC: 2.1 MG/DL (ref 1.6–2.6)
MCH RBC QN AUTO: 27.6 PG (ref 27–31)
MCV RBC AUTO: 91.4 FL (ref 80–94)
MONOCYTES # BLD AUTO: 0.3 THOU/UL (ref 0.11–0.59)
MONOCYTES NFR BLD AUTO: 3.9 % (ref 0–10)
NEUTROPHILS # BLD AUTO: 5.9 THOU/UL (ref 1.4–6.5)
NEUTROPHILS NFR BLD AUTO: 74.7 % (ref 42–75)
PLATELET # BLD AUTO: 135 THOU/UL (ref 130–400)
POTASSIUM SERPL-SCNC: 3.4 MMOL/L (ref 3.5–5.1)
RBC # BLD AUTO: 3.73 MILL/UL (ref 4.7–6.1)
SODIUM SERPL-SCNC: 139 MMOL/L (ref 136–145)
VANCOMYCIN TROUGH SERPL-MCNC: 23.9 UG/ML
WBC # BLD AUTO: 7.9 THOU/UL (ref 4.8–10.8)

## 2018-01-30 PROCEDURE — 4A023N7 MEASUREMENT OF CARDIAC SAMPLING AND PRESSURE, LEFT HEART, PERCUTANEOUS APPROACH: ICD-10-PCS | Performed by: INTERNAL MEDICINE

## 2018-01-30 PROCEDURE — B2151ZZ FLUOROSCOPY OF LEFT HEART USING LOW OSMOLAR CONTRAST: ICD-10-PCS | Performed by: INTERNAL MEDICINE

## 2018-01-30 PROCEDURE — B2111ZZ FLUOROSCOPY OF MULTIPLE CORONARY ARTERIES USING LOW OSMOLAR CONTRAST: ICD-10-PCS | Performed by: INTERNAL MEDICINE

## 2018-01-30 RX ADMIN — VANCOMYCIN HYDROCHLORIDE SCH: 1 INJECTION, SOLUTION INTRAVENOUS at 22:52

## 2018-01-30 RX ADMIN — Medication SCH ML: at 10:51

## 2018-01-30 RX ADMIN — PANTOPRAZOLE SODIUM SCH MG: 40 GRANULE, DELAYED RELEASE ORAL at 10:49

## 2018-01-30 RX ADMIN — Medication SCH ML: at 21:13

## 2018-01-30 RX ADMIN — CEFEPIME HYDROCHLORIDE SCH MLS: 2 INJECTION, POWDER, FOR SOLUTION INTRAVENOUS at 10:49

## 2018-01-30 RX ADMIN — CEFEPIME HYDROCHLORIDE SCH MLS: 2 INJECTION, POWDER, FOR SOLUTION INTRAVENOUS at 21:11

## 2018-01-30 RX ADMIN — POTASSIUM CHLORIDE PRN MLS: 2 INJECTION, SOLUTION, CONCENTRATE INTRAVENOUS at 07:13

## 2018-01-30 NOTE — RAD
PORTABLE UPRIGHT FRONTAL CHEST RADIOGRAPH: 

 

Date: 1-30-18 

 

Comparison: 1-29-18

 

History: Ventilated CCU patient. 

 

FINDINGS: 

Stable endotracheal tube and nasogastric tube. Hazy bibasilar pleural and parenchymal opacity again n
oted. Pulmonary vascular congestion noted. Stable transvenous pacing device. 

 

IMPRESSION: 

Stable lines and tubes. Stable perihilar and bibasilar densities suggest pulmonary edema. Infectious 
pneumonitis cannot be excluded. Follow up to resolution advised. 

 

POS: [facility]

## 2018-01-30 NOTE — CON
DATE OF CONSULTATION:  01/30/2018.

 

DATE OF ADMISSION:  01/15/2018.

 

REASON FOR CONSULTATION:  Evaluate the patient for coronary artery bypass grafting.

 

HISTORY OF PRESENT ILLNESS:  Mr. Montelongo is a 77-year-old gentleman who has been in the hospital, int
[facility], and has had a V-Fib arrest.  He underwent catheterization today, which shows 3-vessel disease
.  I have been asked to see him to consider coronary artery bypass grafting.

 

After discussion with the wife, the patient is currently oxygen dependent at home.  He really only ge
ts around very minimally without a wheelchair or scooter.  He does little to no walking due to his pu
lmonary status.  He has chronic COPD.  He has known peripheral vascular and carotid disease.  Current
ly, he is intubated on the ventilator.  His heart rhythm is paced.  He has a blood pressure of 116/59
 and oxygen saturation of 100%.

 

PAST MEDICAL HISTORY:

1.  Coronary artery disease.

2.  Congestive heart failure.

3.  Cardiomyopathy.

4.  Chronic atrial fibrillation.

5.  Diabetes mellitus.

6.  Hypertension.

7.  Chronic obstructive pulmonary disease.

8.  Asthma.

9.  History of left lower lobe pneumonia.

10.  History of pacemaker placement.

 

ALLERGIES:  None.

 

CURRENT MEDICATIONS:  Noted.

 

REVIEW OF SYSTEMS:  Not performed as the patient is intubated on the ventilator.

 

PHYSICAL EXAMINATION: 

VITAL SIGNS:  As above.

LUNGS:  Have distant breath sounds bilaterally.

HEART:  Rhythm is paced.

ABDOMEN:  Soft and nontender.

EXTREMITIES:  No edema.

 

DIAGNOSTIC DATA:  I have reviewed his chest x-ray series and he has obvious COPD.  He has bilateral s
mall effusions.  I have reviewed his cath films.

 

ASSESSMENT AND PLAN:  This is an unfortunate 77-year-old gentleman whose pulmonary status makes him r
eally not amenable to any surgical intervention at all.  I have discussed this with his wife.  She un
derstands that without surgery, he is likely to die and with surgery, he is more than likely if he manzanares
rvives going to end up in a long-term care facility, which he would not want.  I will discuss this fu
rther with Dr. Silva and Dr. Jerome tomorrow.

## 2018-01-30 NOTE — PDOC.PN
- Subjective


Encounter Start Date: 01/30/18


Encounter Start Time: 14:21


Subjective: No acute overnight events.


-: Had LHC, cardiologist reports~ 90% calcified blockage L main, unstentable





- Objective


Resuscitation Status: 


 











Resuscitation Status           FULL:Full Resuscitation














MAR Reviewed: Yes


Vital Signs & Weight: 


 Vital Signs (12 hours)











  Temp Pulse Resp BP Pulse Ox


 


 01/30/18 12:00    12  


 


 01/30/18 11:30   74   104/42 L 


 


 01/30/18 11:29   73  17   100


 


 01/30/18 11:00  98.1 F    


 


 01/30/18 10:00    13  


 


 01/30/18 09:15  98.4 F  79  16   100


 


 01/30/18 08:00    16  


 


 01/30/18 07:00  98.4 F    


 


 01/30/18 05:16    16  


 


 01/30/18 04:00  98 F    


 


 01/30/18 03:56    14  


 


 01/30/18 03:08   78  15   100








 Weight











Admit Weight                   158 lb


 


Weight                         158 lb 4.67 oz











 Most Recent Monitor Data











Heart Rate from ECG            72


 


NIBP                           92/41


 


NIBP BP-Mean                   60


 


Respiration from ECG           17


 


SpO2                           100














I&O: 


 











 01/29/18 01/30/18 01/31/18





 06:59 06:59 06:59


 


Intake Total 2384.7 2152.5 250


 


Output Total 2548 3641 590


 


Balance -163.3 -1488.5 -340











Result Diagrams: 


 01/30/18 Unknown





 01/30/18 Unknown


Additional Labs: 


 Accuchecks











  01/30/18 01/30/18 01/30/18





  11:15 06:42 06:22


 


POC Glucose  74  101  58 L*














  01/29/18 01/29/18





  21:34 16:32


 


POC Glucose  110  132 H











Radiology Reviewed by me: Yes (Stable perihilar and peribasilar densities)





Phys Exam





- Physical Examination


Constitutional: NAD


HEENT: PERRLA, moist MMs, sclera anicteric


Neck: supple, full ROM


Respiratory: no wheezing, no rales


reduced Breath sounds b/l


Cardiovascular: RRR, no significant murmur, no rub


Gastrointestinal: soft, non-tender, no distention, positive bowel sounds


Musculoskeletal: no edema, pulses present


Neurological: moves all 4 limbs


Intubated but able to respond to commands and responds w head gestures. 


Skin: no rash, normal turgor





Dx/Plan


(1) Coronary artery disease


Code(s): I25.10 - ATHSCL HEART DISEASE OF NATIVE CORONARY ARTERY W/O ANG PCTRS 

  Status: Chronic   


Qualifiers: 


   Coronary Disease-Associated Artery/Lesion type: due to calcified coronary 

lesion   Qualified Code(s): I25.10 - Atherosclerotic heart disease of native 

coronary artery without angina pectoris; I25.84 - Coronary atherosclerosis due 

to calcified coronary lesion; I25.84 - Coronary atherosclerosis due to 

calcified coronary lesion; I25.84 - Coronary atherosclerosis due to calcified 

coronary lesion   


Plan: 


Continue current management/ 





Comment: with NSTEMI due to demand ischemia.


CATH showed ~90% blockage L main, calcified and unstentable per cardiology


Very poor prognosis as patient's clinical condition precluded CABG.


Cardiology discussed extensively with family. 





   





(2) Acute hypoxemic respiratory failure


Code(s): J96.01 - ACUTE RESPIRATORY FAILURE WITH HYPOXIA   Status: Acute   


Plan: 


Intubated. Continue bronchodilator therapy. 


SBT


Daily CXR


Daily ABG


Pulm on board, recs appreciated. 


Continue antibiotics. 





Comment: Stable. 


Previously extubated; reintubated s/p cardiac arrest 1/23.


On minimal vent settings. 


Daily SBT


Continue Vancomycin and Cefepime


Continue IV steroids. 


Resp therapy


Daily CXR


Daily ABG


Pulm on board, recs appreciated.    





(3) Acute on chronic diastolic (congestive) heart failure


Code(s): I50.33 - ACUTE ON CHRONIC DIASTOLIC (CONGESTIVE) HEART FAILURE   Status

: Acute   Comment: Stable. 


Cardiology on board. Recs appreciated. 


Continued on IV furosemide. 





   





(4) PNA (pneumonia)


Code(s): J18.9 - PNEUMONIA, UNSPECIFIED ORGANISM   Status: Acute   


Qualifiers: 


   Pneumonia type: due to unspecified organism   Laterality: bilateral   Lung 

location: unspecified part of lung   Qualified Code(s): J18.9 - Pneumonia, 

unspecified organism   


Plan: 


Confer with ID per antibiotics duration of treatment. 





Comment: Septic on admission. 


On vancomycin and Cefepime/ Cx negative. 


No significant cx growths. 


f/u ID recs.    





(5) Anemia, normocytic normochromic


Code(s): D64.9 - ANEMIA, UNSPECIFIED   Status: Chronic   


Plan: 


Stable. 


Monitor. 





Comment: s/p transfusion with PRBC. 


2/2 Iron deficiency. 


Starged on supplementation.


Transfuse for Hb < 8   





(6) COPD (chronic obstructive pulmonary disease)


Status: Chronic   


Qualifiers: 


   COPD type: unspecified COPD   Qualified Code(s): J44.9 - Chronic obstructive 

pulmonary disease, unspecified   


Comment: Stable O2 requirements. Not in exacerbartion. Continue RT


   





(7) Hypernatremia


Code(s): E87.0 - HYPEROSMOLALITY AND HYPERNATREMIA   Status: Resolved   Comment

: Stable. 


on free water flushes through feeding tube. 





   





(8) Thrombocytopenia


Code(s): D69.6 - THROMBOCYTOPENIA, UNSPECIFIED   Status: Acute   


Plan: 


Monitor.


Platelets trending downwards but no signs of acute bleeding. Pt on Enoxaparin 

BID for NSTEMI. 


Would confer with cardiology- ? discontinuing Lovenox. 





Comment: Mild, asymptomatic. No signs of bleeding. 


SCDs.


Monitor


   





(9) Cardiac arrest


Code(s): I46.9 - CARDIAC ARREST, CAUSE UNSPECIFIED   Status: Resolved   Comment

: V-fib , converted with defibrilation to sinus    





(10) Hypokalemia


Code(s): E87.6 - HYPOKALEMIA   Status: Resolved   Comment: Repleted. 


Likely 2/2 diuretics. 


   





- Plan


cont current plan of care, continue antibiotics, PT/OT, respiratory therapy, 

DVT proph w/lovenox, DVT proph w/SCDs


Continue antibiotics


-: Family meeting regarding goals of care. 





* .

## 2018-01-31 LAB
ALBUMIN SERPL BCG-MCNC: 2.9 G/DL (ref 3.4–4.8)
ALP SERPL-CCNC: 66 U/L (ref 40–150)
ALT SERPL W P-5'-P-CCNC: 75 U/L (ref 8–55)
ANALYZER IN CARDIO: (no result)
ANION GAP SERPL CALC-SCNC: 11 MMOL/L (ref 10–20)
AST SERPL-CCNC: 47 U/L (ref 5–34)
BASE EXCESS STD BLDA CALC-SCNC: 6.9 MEQ/L
BILIRUB SERPL-MCNC: 0.9 MG/DL (ref 0.2–1.2)
BUN SERPL-MCNC: 32 MG/DL (ref 8.4–25.7)
CA-I BLDA-SCNC: 1.2 MMOL/L (ref 1.12–1.3)
CALCIUM SERPL-MCNC: 8.8 MG/DL (ref 7.8–10.44)
CHLORIDE SERPL-SCNC: 102 MMOL/L (ref 98–107)
CO2 SERPL-SCNC: 29 MMOL/L (ref 23–31)
CREAT CL PREDICTED SERPL C-G-VRATE: 85 ML/MIN (ref 70–130)
GLOBULIN SER CALC-MCNC: 2 G/DL (ref 2.4–3.5)
GLUCOSE SERPL-MCNC: 90 MG/DL (ref 83–110)
HCO3 BLDA-SCNC: 29.3 MEQ/L (ref 22–26)
HCT VFR BLDA CALC: 31.4 % (ref 42–52)
HGB BLD-MCNC: 10.1 G/DL (ref 14–18)
HGB BLDA-MCNC: 9.7 G/DL (ref 14–18)
MAGNESIUM SERPL-MCNC: 2.2 MG/DL (ref 1.6–2.6)
MCH RBC QN AUTO: 28.7 PG (ref 27–31)
MCV RBC AUTO: 90.5 FL (ref 80–94)
MDIFF COMPLETE?: YES
O2 A-A PPRESDIFF RESPIRATORY: 107.12 MM[HG] (ref 0–20)
PCO2 BLDA: 33.5 MMHG (ref 35–45)
PH BLDA: 7.56 [PH] (ref 7.35–7.45)
PLATELET # BLD AUTO: 127 THOU/UL (ref 130–400)
PLATELET BLD QL SMEAR: (no result)
PO2 BLDA: 66.4 MMHG (ref 80–100)
POTASSIUM SERPL-SCNC: 3.4 MMOL/L (ref 3.5–5.1)
RBC # BLD AUTO: 3.51 MILL/UL (ref 4.7–6.1)
SODIUM SERPL-SCNC: 139 MMOL/L (ref 136–145)
SPECIMEN DRAWN FROM PATIENT: (no result)
WBC # BLD AUTO: 8.1 THOU/UL (ref 4.8–10.8)

## 2018-01-31 RX ADMIN — CEFEPIME HYDROCHLORIDE SCH MLS: 2 INJECTION, POWDER, FOR SOLUTION INTRAVENOUS at 08:24

## 2018-01-31 RX ADMIN — SCOPALAMINE SCH MG: 1 PATCH, EXTENDED RELEASE TRANSDERMAL at 08:14

## 2018-01-31 RX ADMIN — Medication SCH ML: at 08:15

## 2018-01-31 RX ADMIN — Medication SCH ML: at 20:35

## 2018-01-31 RX ADMIN — PANTOPRAZOLE SODIUM SCH MG: 40 GRANULE, DELAYED RELEASE ORAL at 08:14

## 2018-01-31 NOTE — RAD
PORTABLE UPRIGHT FRONTAL CHEST RADIOGRAPH: 

 

Date: 1-31-18 

 

Comparison: 1-30-18

 

History: Ventilated CCU patient. 

 

FINDINGS: 

Stable dual-lead transvenous pacing device. There is dense opacity in the left base suggesting left l
ower lobe consolidation/collapse and possible left pleural fluid. Hazy density in the right lung base
 may represent pleural fluid as well. Nasogastric tube in place. 

 

There is an endotracheal tube noted, distal tip near the region of the mee, which is difficult to 
visualize secondary to technique. Endotracheal tube is probably within the 1-2 cm of the mee and c
ould be retracted by approximately 2.5-3 cm. No pneumothorax is seen. 

 

IMPRESSION: 

Lines and tubes as above. Endotracheal tube is somewhat low lying and should be slightly retracted. P
ersistent basilar opacity noted. 

 

Results called to ICU staff to be relayed to the covering nurseAdrianna. Call placed at 7:50 a.m. 1-31
-18. Code CR

 

 

 

POS: MARAH

## 2018-01-31 NOTE — PDOC.PN
- Subjective


Encounter Start Date: 01/31/18


Encounter Start Time: 14:09


Subjective: No acute events overnight. 


-: Cath yesterday 90% disease in L main





- Objective


Resuscitation Status: 


 











Resuscitation Status           FULL:Full Resuscitation














MAR Reviewed: Yes


Vital Signs & Weight: 


 Vital Signs (12 hours)











  Temp Pulse Pulse Pulse Resp BP BP


 


 01/31/18 12:00      20  


 


 01/31/18 11:15    90  83    112/41 L


 


 01/31/18 11:00  99.3 F      


 


 01/31/18 10:13   95     124/33 L 


 


 01/31/18 10:00      26 H  


 


 01/31/18 08:00  99.3 F  86    15  


 


 01/31/18 07:00  99.9 F H  87     136/51 L 


 


 01/31/18 06:00      19  


 


 01/31/18 04:00  98.8 F     20  


 


 01/31/18 02:00      21 H  














  BP Pulse Ox


 


 01/31/18 12:00  


 


 01/31/18 11:15  101/34 L 


 


 01/31/18 11:00  


 


 01/31/18 10:13  


 


 01/31/18 10:00  


 


 01/31/18 08:00   98


 


 01/31/18 07:00  


 


 01/31/18 06:00  


 


 01/31/18 04:00  


 


 01/31/18 02:00  








 Weight











Admit Weight                   158 lb


 


Weight                         152 lb 12.485 oz











 Most Recent Monitor Data











Heart Rate from ECG            85


 


NIBP                           117/47


 


NIBP BP-Mean                   92


 


Respiration from ECG           22


 


SpO2                           98














I&O: 


 











 01/30/18 01/31/18 02/01/18





 06:59 06:59 06:59


 


Intake Total 2152.5 2271.9 640


 


Output Total 3641 2525 955


 


Balance -1488.5 -253.1 -315











Result Diagrams: 


 01/31/18 04:37





 01/31/18 04:37


Additional Labs: 


 Accuchecks











  01/31/18 01/31/18 01/30/18





  11:44 04:41 21:16


 


POC Glucose  140 H  94  114 H














  01/30/18





  15:47


 


POC Glucose  105














Phys Exam





- Physical Examination


Constitutional: NAD


HEENT: PERRLA, moist MMs, sclera anicteric


Neck: no JVD, supple


Respiratory: no wheezing, no rales, no rhonchi, clear to auscultation bilateral


Decreased sounds b/l


Cardiovascular: RRR, no significant murmur, no rub


Gastrointestinal: soft, non-tender, no distention, positive bowel sounds


Musculoskeletal: no edema


Neurological: non-focal


following connands with gestures and head movement. 


Skin: no rash, normal turgor





Dx/Plan


(1) Coronary artery disease


Code(s): I25.10 - ATHSCL HEART DISEASE OF NATIVE CORONARY ARTERY W/O ANG PCTRS 

  Status: Chronic   


Qualifiers: 


   Coronary Disease-Associated Artery/Lesion type: due to calcified coronary 

lesion   Qualified Code(s): I25.10 - Atherosclerotic heart disease of native 

coronary artery without angina pectoris; I25.84 - Coronary atherosclerosis due 

to calcified coronary lesion; I25.84 - Coronary atherosclerosis due to 

calcified coronary lesion; I25.84 - Coronary atherosclerosis due to calcified 

coronary lesion   


Comment: CATH showed ~90% blockage L main, calcified and unstentable per 

cardiology


Very poor prognosis as patient's clinical condition precluded CABG.


Cardiology discussed extensively with family. Possible hospice discussed.


Will f/u with family. 





   





(2) Acute hypoxemic respiratory failure


Code(s): J96.01 - ACUTE RESPIRATORY FAILURE WITH HYPOXIA   Status: Acute   

Comment: Stable but still on the vent.  


Previously extubated; reintubated s/p cardiac arrest 1/23.


On minimal vent settings. 


Daily SBT


Will discontinue Vancomycin and Cefepime


Continue IV steroids. 


Resp therapy


Daily CXR


Daily ABG


Pulm on board, recs appreciated. Will attempt weaning off vent    





(3) Acute on chronic diastolic (congestive) heart failure


Code(s): I50.33 - ACUTE ON CHRONIC DIASTOLIC (CONGESTIVE) HEART FAILURE   Status

: Acute   Comment: Stable. 


Cardiology on board. Recs appreciated. 


Continued on IV furosemide. 


Achieving negative fluid balance. 








   





(4) PNA (pneumonia)


Code(s): J18.9 - PNEUMONIA, UNSPECIFIED ORGANISM   Status: Resolved   


Qualifiers: 


   Pneumonia type: due to unspecified organism   Laterality: bilateral   Lung 

location: unspecified part of lung   Qualified Code(s): J18.9 - Pneumonia, 

unspecified organism   


Plan: 


As above. 





Comment: Septic on admission, placed on vancomycin and Cefepime/ Cx negative. 


No significant cx growths. 


1/321: Will discontinue Vancomycin and Cefepime   





(5) Anemia, normocytic normochromic


Code(s): D64.9 - ANEMIA, UNSPECIFIED   Status: Chronic   


Plan: 


Stable. 


Monitor. 





Comment: s/p transfusion with PRBC. 


2/2 Iron deficiency. 


Starged on supplementation.


Transfuse for Hb < 8   





(6) COPD (chronic obstructive pulmonary disease)


Status: Chronic   


Qualifiers: 


   COPD type: unspecified COPD   Qualified Code(s): J44.9 - Chronic obstructive 

pulmonary disease, unspecified   


Comment: Stable O2 requirements. Not in exacerbartion. Continue RT, nebs


   





(7) Hypernatremia


Code(s): E87.0 - HYPEROSMOLALITY AND HYPERNATREMIA   Status: Resolved   Comment

: Stable. 


on free water flushes through feeding tube. 





   





(8) Thrombocytopenia


Code(s): D69.6 - THROMBOCYTOPENIA, UNSPECIFIED   Status: Acute   


Plan: 


Stable. 


Will monitor. 





Comment: Mild, asymptomatic. No signs of bleeding. 


SCDs.


Enoxaparin discontinued. 





   





(9) Cardiac arrest


Code(s): I46.9 - CARDIAC ARREST, CAUSE UNSPECIFIED   Status: Resolved   Comment

: V-fib , converted with defibrilation to sinus    





(10) Hypokalemia


Code(s): E87.6 - HYPOKALEMIA   Status: Resolved   Comment: Repleted. 


Likely 2/2 diuretics. 


   





- Plan


cont current plan of care, PT/OT, 





* .

## 2018-01-31 NOTE — PRG
DATE OF SERVICE:  01/30/2018

 

SUBJECTIVE:  Mr. Montelongo underwent cardiac catheterization.  He has critical 
left main lesion.  He awakens and follows commands, and he moves all 
extremities.  His lungs, heart, and abdomen are unchanged.

 

LABORATORY DATA:  White count 7.9, hemoglobin 10.3, platelets 135.

 

Sodium 139, potassium 3.4, chloride 100, bicarbonate 30, BUN 27, creatinine 0.67
, glucose 69.

 

I discussed the findings with his wife.  I have not so far been able to figure 
out it how functional it was.  His wife has continued to tell me that he was 
doing great, but more I questioned her today, the more I  became aware that he 
was minimally functional.  Last fall, he could go out in the yard and work in 
the garden a little bit, but he took an oxygen tank with him.

 

After his recent hospitalization at [facility], he went shopping with his 
wife, but had to get the scooter cart to get him in from the parking lot.  He 
was unable to walk in from the parking lot.

 

I think his functional status is the biggest risk factor for surviving a 
surgery.  Most importantly, I asked his wife if she thought he would want to go 
through a surgery and she informed me she did not.  We will continue to do our 
due diligence and let Cardiothoracic Surgery to assess him and then if we all 
decide that surgery is not in his best interest from a survivability standpoint
, we will proceed with extubation tomorrow and comfort care hopefully.

 

MAIRA

## 2018-02-01 LAB
ALBUMIN SERPL BCG-MCNC: 3.3 G/DL (ref 3.4–4.8)
ALP SERPL-CCNC: 78 U/L (ref 40–150)
ALT SERPL W P-5'-P-CCNC: 78 U/L (ref 8–55)
ANION GAP SERPL CALC-SCNC: 11 MMOL/L (ref 10–20)
AST SERPL-CCNC: 46 U/L (ref 5–34)
BILIRUB SERPL-MCNC: 0.9 MG/DL (ref 0.2–1.2)
BUN SERPL-MCNC: 24 MG/DL (ref 8.4–25.7)
CALCIUM SERPL-MCNC: 9.3 MG/DL (ref 7.8–10.44)
CHLORIDE SERPL-SCNC: 99 MMOL/L (ref 98–107)
CO2 SERPL-SCNC: 32 MMOL/L (ref 23–31)
CREAT CL PREDICTED SERPL C-G-VRATE: 89 ML/MIN (ref 70–130)
GLOBULIN SER CALC-MCNC: 2.3 G/DL (ref 2.4–3.5)
GLUCOSE SERPL-MCNC: 93 MG/DL (ref 83–110)
HGB BLD-MCNC: 10.9 G/DL (ref 14–18)
MAGNESIUM SERPL-MCNC: 2.3 MG/DL (ref 1.6–2.6)
MCH RBC QN AUTO: 28.6 PG (ref 27–31)
MCV RBC AUTO: 92.3 FL (ref 80–94)
MDIFF COMPLETE?: YES
PLATELET # BLD AUTO: 146 THOU/UL (ref 130–400)
POTASSIUM SERPL-SCNC: 3.2 MMOL/L (ref 3.5–5.1)
RBC # BLD AUTO: 3.82 MILL/UL (ref 4.7–6.1)
SODIUM SERPL-SCNC: 139 MMOL/L (ref 136–145)
WBC # BLD AUTO: 7.3 THOU/UL (ref 4.8–10.8)

## 2018-02-01 RX ADMIN — PANTOPRAZOLE SODIUM SCH MG: 40 GRANULE, DELAYED RELEASE ORAL at 08:02

## 2018-02-01 RX ADMIN — Medication SCH ML: at 20:04

## 2018-02-01 RX ADMIN — Medication SCH ML: at 08:02

## 2018-02-01 NOTE — PRG
DATE OF SERVICE:  01/31/2018

 

SUBJECTIVE:  Mr. Montelongo is doing well overnight.   He is lot awake this morning.  He was placed on p
ressure support, 5 of PEEP and 5 of pressure support.  He did well with that.  He passed the leak kayden
t.  His minute volume is 8 L a minute.

 

_____ stable overnight.  He has had no ventricular arrhythmia.

 

OBJECTIVE:

VITAL SIGNS:  He is afebrile.  Heart rate was 82, respiratory rate 24, saturation 95 since he has bee
n extubated and was close to 100% intubated.

LUNGS:  Clear.

HEART:  Regular rhythm.

ABDOMEN:  Soft.

EXTREMITIES:  Without asymmetry.

 

Chest radiograph still is abnormal, have not cleared completely.

 

Cardiothoracic Surgery assessed and it was felt that he is not a candidate for operative procedure be
cause of his unlikely chance of postop recovery and weaning from mechanical ventilation.

 

His pH today is 7.56, pCO2 of 33, and pO2 of 66.

 

The wife answered all his questions.  I was actually talking to Mr. Montelongo prior to extubation about
 re-intubation.  He very quickly told me no.  He never wanted to be intubated again, so he is now a D
O NOT RESUSCITATE patient.  Subsequently, he has been extubated and has been evaluated multiple times
 this afternoon and has done well since post-extubation.

 

He has been in no distress.

 

IMPRESSION:

1.  Pneumonia.

2.  Respiratory failure.

3.  Underlying obstructive lung disease, on oxygen.

4.  Chronic respiratory failure, on home O2 with acute exacerbation of his pneumonia.

5.  Ventricular fibrillation and ventricular tachycardia secondary to left main coronary disease.

6.  Inoperable left main coronary disease.

7.  Deconditioning.  Traveling in a scooter to the grocery store and ambulating very little around 
e house with oxygen.

 

PLAN:  Transfer out of the ICU for supportive care.  Hopefully, he will regain some degree of the fun
ctional status.  Met with the wife and answered all her questions.

 

Critical care time was 30 minutes.

## 2018-02-01 NOTE — PRG
DATE OF SERVICE:  02/01/2018

 

SUBJECTIVE:  Mr. Montelongo is extubated today.

 

PHYSICAL EXAMINATION:

GENERAL:  He is alert, but mildly confused.

VITAL SIGNS:  Blood pressure 146/74, pulse 90 with atrial fibrillation.

LUNGS:  Clear.

CARDIAC:  Irregularly irregular.

ABDOMEN:  Soft and nontender.

 

ASSESSMENT:

1.  Severe calcified left main stenosis, inoperable.

2.  Normal left ventricular systolic function.

3.  Chronic obstructive pulmonary disease.

4.  Chronic atrial fibrillation.

 

PLAN:

1.  We will resume Lovenox tomorrow morning.

2.  Need to control the heart rate.  We will give diltiazem intravenously.

3.  Prognosis is very poor.   The patient has been turned down for surgery.

## 2018-02-01 NOTE — PRG
DATE OF SERVICE:  02/01/2018

 

SUBJECTIVE:  Mr. Montelongo was started on some IV Cardizem.  This morning, his 
heart rate was in the 90s, in atrial fibrillation.

 

He denied having any shortness of breath.

 

OBJECTIVE: 

LUNGS:  Surprisingly clear anteriorly and laterally.

HEART:  Irregularly irregular.

ABDOMEN:  Soft and nontender.

 

LABORATORY DATA:  White count 7.3, hemoglobin 10.9, platelets 146.

 

Sodium 139, potassium 3.2, chloride 99, bicarbonate 32, BUN 24, creatinine 
0.68.  Last glucose 124.

 

IMPRESSION:

1.  Respiratory failure associated with ventricular fibrillation, ventricular 
tachycardia, and left main coronary disease.

2.  Probable right lower lobe pneumonia on presentation.

3.  Deconditioning.

4.  Underlying obstructive lung disease.

5.  Acute on chronic respiratory failure with chronic hypoxemia, on home oxygen 
therapy.

6.  Status post intubation at [facility] recently.  He was only able to 
get around in a scooter when he went to the [facility] recently. He cannot walk 
very far.

 

PLAN:  Slow physical therapy.  Could start him on p.o. Cardizem since he is 
swallowing well at 30 mg 3 times a day, and then perhaps Cardizem can be weaned 
off and we can consider going to a skilled facility.  Discussed the above with 
the .

 

MAIRA

## 2018-02-01 NOTE — PDOC.PN
- Subjective


Encounter Start Date: 02/01/18


Encounter Start Time: 12:54


Subjective: Extubated yesterday. Alert but not fullt oriented today. 


-: No acute events overnight. Tolerating NC oxygen 





- Objective


Resuscitation Status: 


 











Resuscitation Status           DNR:Do Not Resuscitate














Vital Signs & Weight: 


 Vital Signs (12 hours)











  Temp Pulse Resp Pulse Ox


 


 02/01/18 12:00  98.0 F   


 


 02/01/18 10:45   87  20  95


 


 02/01/18 08:00  98.2 F  93  21 H  95


 


 02/01/18 07:42     97


 


 02/01/18 07:40   93  21 H  98


 


 02/01/18 07:00  97.9 F   


 


 02/01/18 04:00  98.1 F   


 


 02/01/18 02:16   81  22 H  95








 Weight











Admit Weight                   158 lb


 


Weight                         152 lb 1.903 oz











 Most Recent Monitor Data











Heart Rate from ECG            85


 


NIBP                           120/46


 


NIBP BP-Mean                   78


 


Respiration from ECG           28


 


SpO2                           97














I&O: 


 











 01/31/18 02/01/18 02/02/18





 06:59 06:59 06:59


 


Intake Total 2271.9 1945 240


 


Output Total 2525 2715 880


 


Balance -253.1 -770 -640











Result Diagrams: 


 02/01/18 06:33





 02/01/18 06:33


Additional Labs: 


 Accuchecks











  02/01/18 02/01/18 01/31/18





  10:31 04:55 20:46


 


POC Glucose  103  78  97














  01/31/18





  17:06


 


POC Glucose  134 H














Phys Exam





- Physical Examination


Constitutional: NAD


HEENT: PERRLA, moist MMs, sclera anicteric


Neck: no JVD, supple, full ROM


Respiratory: no wheezing, no rales, no rhonchi, clear to auscultation bilateral


Cardiovascular: RRR, no significant murmur, no rub


Gastrointestinal: soft, non-tender, no distention, positive bowel sounds


Musculoskeletal: no edema, pulses present


Neurological: non-focal, moves all 4 limbs


Psychiatric: normal affect


Deviation from normal: A&O x 1


Skin: no rash, normal turgor





Dx/Plan


(1) Coronary artery disease


Code(s): I25.10 - ATHSCL HEART DISEASE OF NATIVE CORONARY ARTERY W/O ANG PCTRS 

  Status: Chronic   


Qualifiers: 


   Coronary Disease-Associated Artery/Lesion type: due to calcified coronary 

lesion   Qualified Code(s): I25.10 - Atherosclerotic heart disease of native 

coronary artery without angina pectoris; I25.84 - Coronary atherosclerosis due 

to calcified coronary lesion; I25.84 - Coronary atherosclerosis due to 

calcified coronary lesion; I25.84 - Coronary atherosclerosis due to calcified 

coronary lesion   


Comment: CATH showed inoperable lesion (~90% blockage L main, calcified)


Very poor prognosis as patient's clinical condition precluded CABG.


Cardiology discussed extensively with family. Possible hospice discussed.


Will f/u with family. 





   





(2) Acute hypoxemic respiratory failure


Code(s): J96.01 - ACUTE RESPIRATORY FAILURE WITH HYPOXIA   Status: Resolved   

Comment: Stable. Extubated 1/31/ 


He had been reintubated s/p cardiac arrest 1/23.


On minimal vent settings. 


Daily SBT


Continue IV steroids. 


Resp therapy


Pulm on board, recs appreciated.   





(3) Acute on chronic diastolic (congestive) heart failure


Code(s): I50.33 - ACUTE ON CHRONIC DIASTOLIC (CONGESTIVE) HEART FAILURE   Status

: Acute   Comment: Stable. 


Cardiology on board. Recs appreciated. 


Continued on IV furosemide. 


Achieving negative fluid balance. 








   





(4) Anemia, normocytic normochromic


Code(s): D64.9 - ANEMIA, UNSPECIFIED   Status: Chronic   Comment: s/p 

transfusion with PRBC. 


2/2 Iron deficiency. 


Starged on supplementation.


Transfuse for Hb < 8   





(5) COPD (chronic obstructive pulmonary disease)


Status: Chronic   


Qualifiers: 


   COPD type: unspecified COPD   Qualified Code(s): J44.9 - Chronic obstructive 

pulmonary disease, unspecified   


Comment: Stable O2 requirements. Not in exacerbartion. Continue RT, nebs


   





(6) Thrombocytopenia


Code(s): D69.6 - THROMBOCYTOPENIA, UNSPECIFIED   Status: Acute   Comment: Mild, 

asymptomatic. No signs of bleeding. 


SCDs.


Enoxaparin discontinued. 





   





(7) Hypernatremia


Code(s): E87.0 - HYPEROSMOLALITY AND HYPERNATREMIA   Status: Resolved   Comment

: Stable. 


on free water flushes through feeding tube. 





   





(8) Cardiac arrest


Code(s): I46.9 - CARDIAC ARREST, CAUSE UNSPECIFIED   Status: Resolved   Comment

: V-fib , converted with defibrilation to sinus    





(9) Hypokalemia


Code(s): E87.6 - HYPOKALEMIA   Status: Acute   Comment: Repleted. 


Likely 2/2 diuretics. 


   





(10) PNA (pneumonia)


Code(s): J18.9 - PNEUMONIA, UNSPECIFIED ORGANISM   Status: Resolved   


Qualifiers: 


   Pneumonia type: due to unspecified organism   Laterality: bilateral   Lung 

location: unspecified part of lung   Qualified Code(s): J18.9 - Pneumonia, 

unspecified organism   


Comment: Resolved.


Septic on admission, placed on vancomycin and Cefepime/ Cx negative. 


No significant cx growths. 


Vancomycin and Cefepime discontinued 1/31   





- Plan


cont current plan of care, PT/OT





* .

## 2018-02-02 LAB
ANION GAP SERPL CALC-SCNC: 13 MMOL/L (ref 10–20)
BUN SERPL-MCNC: 21 MG/DL (ref 8.4–25.7)
CALCIUM SERPL-MCNC: 9.3 MG/DL (ref 7.8–10.44)
CHLORIDE SERPL-SCNC: 102 MMOL/L (ref 98–107)
CO2 SERPL-SCNC: 29 MMOL/L (ref 23–31)
CREAT CL PREDICTED SERPL C-G-VRATE: 85 ML/MIN (ref 70–130)
GLUCOSE SERPL-MCNC: 79 MG/DL (ref 83–110)
HGB BLD-MCNC: 10.5 G/DL (ref 14–18)
MCH RBC QN AUTO: 28.8 PG (ref 27–31)
MCV RBC AUTO: 93.6 FL (ref 80–94)
MDIFF COMPLETE?: YES
PLATELET # BLD AUTO: 172 THOU/UL (ref 130–400)
POTASSIUM SERPL-SCNC: 4 MMOL/L (ref 3.5–5.1)
RBC # BLD AUTO: 3.65 MILL/UL (ref 4.7–6.1)
SODIUM SERPL-SCNC: 140 MMOL/L (ref 136–145)
WBC # BLD AUTO: 6.6 THOU/UL (ref 4.8–10.8)

## 2018-02-02 RX ADMIN — Medication SCH ML: at 09:27

## 2018-02-02 RX ADMIN — Medication SCH: at 22:23

## 2018-02-02 RX ADMIN — PANTOPRAZOLE SODIUM SCH MG: 40 GRANULE, DELAYED RELEASE ORAL at 09:23

## 2018-02-02 RX ADMIN — INSULIN LISPRO PRN UNIT: 100 INJECTION, SOLUTION INTRAVENOUS; SUBCUTANEOUS at 17:58

## 2018-02-02 RX ADMIN — INSULIN LISPRO PRN UNIT: 100 INJECTION, SOLUTION INTRAVENOUS; SUBCUTANEOUS at 21:14

## 2018-02-02 NOTE — PRG
DATE OF SERVICE:  02/02/2018

 

SUBJECTIVE:  Mr. Montelongo is stable.  He is a little confused today.

 

OBJECTIVE:

VITAL SIGNS:  He is afebrile.  Heart 79, respiratory rate 18 and oximetry is 97% on 2 liters.

LUNGS:  Clear.

HEART:  Regular rhythm.

ABDOMEN:  Soft.

 

IMPRESSION:

1.  Status post respiratory failure, presumably triggered by pneumonia.

2.  Left main coronary disease with calcified 95% left main lesion.

3.  Status post ventricular fibrillation followed by ventricular tachycardia.  In this admission the 
ventricular fibrillation was while after he was extubated.  Ventricular tachycardia was while he was 
intubated.

4.  Underlying obstructive lung disease.

5.  Chronic respiratory failure, on home oxygen with acute exacerbation.

6.  Extreme deconditioning, unable to ambulate in from the parking lot recently.

7.  Recent intubation at home, which led to his admission here and then reintubation when he had vent
ricular fibrillation here.

 

PLAN:  Continue supportive care.  Dr. Silva was contemplating whether somebody might want to try per
cutaneous intervention of this left main lesion.  It would be a significant increase over average cat
heterization mortality.  It is unclear whether or not he would want to go through this and we can vianca
lly ask him now since he is not really of sound mind.  His wife has implied that he would not really 
want to go through anything that was in the ICU, but we can continue to discuss this with her.  I am 
not sure he is going to become functional enough to even consider this.

## 2018-02-02 NOTE — PRG
DATE OF SERVICE:  02/02/2018

 

Mr. Montelongo is awake and alert today.  He knows his name is Kleber.  He does not know where he is or wh
at year it is.  He does follow commands.

 

PHYSICAL EXAMINATION: 

VITAL SIGNS:  Blood pressure 132/43, pulse is 80, it is irregular.  He is still on intravenous diltia
zem.

LUNGS:  Clear.

CARDIAC:  Irregular, irregular.

ABDOMEN:  Soft, nontender.

 

ASSESSMENT:

1.  Severe left main stenosis, heavily calcified.

2.  Inoperable due to pulmonary status.

3.  Atrial fibrillation, paroxysmal.

 

PLAN:  

1.  He is back on Lovenox.

2.  Transition to oral diltiazem.

 

CURRENT STATUS:  Do not resuscitate.  

 

I spoke with Dr. Jerome today about the possibility of getting an opinion from a cardiologist in [facility]
on about whether high risk intervention could be considered.  Would probably require rotablader of th
e left main unprotected, most likely could not protect the circumflex.  Dr. Jerome is not certain that
 the patient or family would be interested in that.  For now, we will continue medical therapy.  Cont
inue to follow.  Prognosis is obviously is poor in this unfortunate gentleman.

## 2018-02-02 NOTE — PDOC.PN
- Subjective


Encounter Start Date: 02/02/18


Encounter Start Time: 12:20


Subjective: awake, not oriented, on nasal canula


-: no chest pain or palp


-: follows verbal stimuli





- Objective


Resuscitation Status: 


 











Resuscitation Status           DNR:Do Not Resuscitate














MAR Reviewed: Yes


Vital Signs & Weight: 


 Vital Signs (12 hours)











  Temp Pulse Resp Pulse Ox


 


 02/02/18 12:00  98.7 F   


 


 02/02/18 11:54   79  19  97


 


 02/02/18 08:00  98.2 F   


 


 02/02/18 07:20     99


 


 02/02/18 07:19   77  21 H  99


 


 02/02/18 04:00  97.9 F   








 Weight











Admit Weight                   158 lb


 


Weight                         139 lb 15.896 oz











 Most Recent Monitor Data











Heart Rate from ECG            73


 


NIBP                           95/36


 


NIBP BP-Mean                   63


 


Respiration from ECG           19


 


SpO2                           93














I&O: 


 











 02/01/18 02/02/18 02/03/18





 06:59 06:59 06:59


 


Intake Total 1945 783.2 510


 


Output Total 2715 2240 1005


 


Balance -770 -1456.8 -495











Result Diagrams: 


 02/02/18 03:56





 02/02/18 03:56


Additional Labs: 


 Accuchecks











  02/02/18 02/02/18 02/01/18





  12:41 05:38 21:29


 


POC Glucose  135 H  95  101














  02/01/18





  16:24


 


POC Glucose  124 H














Phys Exam





- Physical Examination


HEENT: PERRLA, sclera anicteric


Neck: no JVD, supple


Respiratory: no wheezing, no rales


Cardiovascular: RRR, no significant murmur


Gastrointestinal: soft, non-tender, positive bowel sounds


Musculoskeletal: pulses present, edema present


Neurological: non-focal, moves all 4 limbs





Dx/Plan


(1) COPD (chronic obstructive pulmonary disease)


Status: Chronic   


Qualifiers: 


   COPD type: unspecified COPD   Qualified Code(s): J44.9 - Chronic obstructive 

pulmonary disease, unspecified   


Comment: Stable O2 requirements. Not in exacerbartion. Continue RT, nebs


   





(2) Acute on chronic diastolic (congestive) heart failure


Code(s): I50.33 - ACUTE ON CHRONIC DIASTOLIC (CONGESTIVE) HEART FAILURE   Status

: Resolved   





(3) Anemia, normocytic normochromic


Code(s): D64.9 - ANEMIA, UNSPECIFIED   Status: Chronic   





(4) Aortic stenosis, moderate


Code(s): I35.0 - NONRHEUMATIC AORTIC (VALVE) STENOSIS   Status: Chronic   





(5) Coronary artery disease


Code(s): I25.10 - ATHSCL HEART DISEASE OF NATIVE CORONARY ARTERY W/O ANG PCTRS 

  Status: Chronic   


Qualifiers: 


   Coronary Disease-Associated Artery/Lesion type: due to calcified coronary 

lesion   Qualified Code(s): I25.10 - Atherosclerotic heart disease of native 

coronary artery without angina pectoris; I25.84 - Coronary atherosclerosis due 

to calcified coronary lesion; I25.84 - Coronary atherosclerosis due to 

calcified coronary lesion; I25.84 - Coronary atherosclerosis due to calcified 

coronary lesion   


Comment: CATH showed inoperable lesion (~90% blockage L main, calcified)


Very poor prognosis as patient's clinical condition precluded CABG.


Cardiology discussed extensively with family. Possible hospice discussed.


Will f/u with family. 





   





(6) Acute hypoxemic respiratory failure


Code(s): J96.01 - ACUTE RESPIRATORY FAILURE WITH HYPOXIA   Status: Resolved   

Comment: Stable. Extubated 1/31/ 


He had been reintubated s/p cardiac arrest 1/23.   





(7) Cardiac arrest


Code(s): I46.9 - CARDIAC ARREST, CAUSE UNSPECIFIED   Status: Resolved   Comment

: V-fib , converted with defibrilation to sinus    





(8) Demand ischemia of myocardium


Code(s): I24.8 - OTHER FORMS OF ACUTE ISCHEMIC HEART DISEASE   Status: Resolved

   Comment: Symptom Free. 2/2 Cardiac arrest. 


Cardiology on board. 


Would like to discuss with patient's spouse to explain risk of proceeding with 

catheterization


   





- Plan


on asp, lovenox full dose


-: oral cardizem


-: iv solu, nebs


-: PT to mobilize pt as tolerated


-: prognosis guarded





* .








Review of Systems





- Medications/Allergies


Allergies/Adverse Reactions: 


 Allergies











Allergy/AdvReac Type Severity Reaction Status Date / Time


 


No Known Drug Allergies Allergy   Unverified 01/15/18 03:39











Medications: 


 Current Medications





Acetaminophen (Tylenol)  500 mg PO Q6H PRN


   PRN Reason: Headache/Fever or Pain


   Last Admin: 01/22/18 02:44 Dose:  500 mg


Albuterol/Ipratropium (Duoneb)  3 ml NEB [id number] SHAUN


   Last Admin: 02/02/18 11:54 Dose:  3 ml


Aspirin (Aspirin Chewable)  81 mg PO DAILY SHAUN


   Last Admin: 02/02/18 09:23 Dose:  81 mg


Bisacodyl (Dulcolax)  10 mg SC DAILYPRN PRN


   PRN Reason: Constipation


Dextrose/Water (Dextrose 50%)  25 gm SLOW IVP PRN PRN


   PRN Reason: Hypoglycemia


   Last Admin: 01/30/18 06:25 Dose:  25 gm


Diltiazem HCl (Cardizem)  60 mg PO TID [facility]


   Last Admin: 02/02/18 09:33 Dose:  60 mg


Enoxaparin Sodium (Lovenox)  60 mg SC 0900,2100 [facility]


   Last Admin: 02/02/18 09:21 Dose:  60 mg


Ferrous Sulfate (Ferrous Sulfulte)  300 mg PO BID-WM [facility]


   Last Admin: 02/02/18 09:27 Dose:  300 mg


Furosemide (Lasix)  40 mg SLOW IVP 0600,1400 [facility]


   Last Admin: 02/02/18 05:37 Dose:  40 mg


Glucagon (Glucagon)  1 mg IM PRN PRN


   PRN Reason: Hypoglycemia


Guaifenesin (Organ-I Nr)  400 mg PO Q4H [facility]


   Last Admin: 02/02/18 09:00 Dose:  Not Given


Potassium Chloride 40 meq/ (Sodium Chloride)  270 mls @ 135 mls/hr IVPB ASDIR 

PRN


   PRN Reason: FOR SERUM K+ 2.5 - 3.5


   Last Admin: 01/30/18 07:13 Dose:  270 mls


Potassium Chloride 40 meq/ (Device)  100 mls @ 50 mls/hr IVPB ASDIR PRN


   PRN Reason: FOR SERUM K+ 2.5 - 3.5


Magnesium Sulfate 1 gm/ Sodium (Chloride)  102 mls @ 102 mls/hr IV PRN PRN


   PRN Reason: MAG LEVEL 1.4 - 2.0


Magnesium Sulfate 2 gm/ Device  100 mls @ 100 mls/hr IVPB ASDIR PRN


   PRN Reason: MAGNESIUM < 1.4


Potassium Phosphate 9 mmol/ (Sodium Chloride)  103 mls @ 25.75 mls/hr IVPB 

ASDIR PRN


   PRN Reason: Phosphate 1.0-1.8


Potassium Phosphate 12 mmol/ (Sodium Chloride)  254 mls @ 63.5 mls/hr IV ASDIR 

PRN


   PRN Reason: Serum phosphate 0.5-0.9


Potassium Phosphate 15 mmol/ (Sodium Chloride)  255 mls @ 63.75 mls/hr IV ASDIR 

PRN


   PRN Reason: Serum Phos < 0.5


Dextrose/Water (D5w)  1,000 mls @ 0 mls/hr IV .Q0M PRN; As Directed


   PRN Reason: Hypoglycemia


Diltiazem HCl 125 mg/ Sodium (Chloride)  125 mls @ 5 mls/hr IVPB INF SHAUN; 5 MG/

HR


   PRN Reason: Protocol


   Last Admin: 02/02/18 09:15 Dose:  125 mls


Insulin Human Lispro (Humalog)  0 units SC .MILD SLIDING SCALE PRN


   PRN Reason: Mild Correctional Scale


   Last Admin: 01/25/18 16:16 Dose:  2 unit


Insulin Human Lispro (Humalog)  0 units SC .BEDTIME SLIDING SC PRN


   PRN Reason: Bedtime Correctional Scale


Lorazepam (Ativan)  2 mg SLOW IVP Q2H PRN


   PRN Reason: Anxiety to achieve Walden 2-3


   Stop: 02/14/18 09:48


   Last Admin: 01/21/18 22:15 Dose:  2 mg


Magnesium Oxide (Magnesium Oxide)  400 mg PO BIDPRN PRN


   PRN Reason: FOR SERUM MAG 1.4 - 2.0


Magnesium Oxide (Magnesium Oxide)  800 mg PO PRN PRN


   PRN Reason: FOR SERUM MAG < 1.4


Methylprednisolone Sodium Succinate (Solu-Medrol)  40 mg IVP DAILY [facility]


   Last Admin: 02/02/18 09:23 Dose:  40 mg


Miscellaneous Medication (Phos-Nak)  1 pkt PO TIDPRN PRN


   PRN Reason: FOR PHOS LEVEL 1.0 - 1.8


Miscellaneous Medication (Phos-Nak)  2 pkt PO TIDPRN PRN


   PRN Reason: FOR PHOS LEVEL 0.5 - 1.0


Pantoprazole Sodium (Protonix)  40 mg PO DAILY [facility]


   Last Admin: 02/02/18 09:23 Dose:  40 mg


Polyethylene Glycol (Miralax)  17 gm PO DAILY [facility]


   Last Admin: 02/02/18 09:23 Dose:  17 gm


Potassium Chloride (K-Dur)  40 meq PO ASDIR PRN


   PRN Reason: FOR SERUM K+  2.5 - 3.5


Potassium Chloride (Klor-Con)  40 meq PER TUBE ASDIR PRN


   PRN Reason: FOR SERUM K+ 2.5-3.5


   Last Admin: 02/01/18 07:57 Dose:  40 meq


Potassium Chloride (Klor-Con)  20 meq PO BID-[facility]


   Last Admin: 02/02/18 09:23 Dose:  20 meq


Propofol (Diprivan)  1,000 mg IV INF PRN; Protocol


   PRN Reason: TO ACHIEVE WALDNE SCORE 2-3


   Stop: 02/14/18 09:48


   Last Admin: 01/30/18 11:42 Dose:  1,000 mg


Scopolamine (Transderm Scop)  1.5 mg TD Q3D [facility]


   Last Admin: 01/31/18 08:14 Dose:  1.5 mg


Sodium Chloride (Flush - Normal Saline)  10 ml IVF Q12HR [facility]


   Last Admin: 02/02/18 09:27 Dose:  10 ml


Sodium Chloride (Flush - Normal Saline)  10 ml IVF PRN PRN


   PRN Reason: Saline Flush

## 2018-02-03 LAB
ANION GAP SERPL CALC-SCNC: 14 MMOL/L (ref 10–20)
BUN SERPL-MCNC: 25 MG/DL (ref 8.4–25.7)
CALCIUM SERPL-MCNC: 9.5 MG/DL (ref 7.8–10.44)
CHLORIDE SERPL-SCNC: 98 MMOL/L (ref 98–107)
CO2 SERPL-SCNC: 30 MMOL/L (ref 23–31)
CREAT CL PREDICTED SERPL C-G-VRATE: 75 ML/MIN (ref 70–130)
GLUCOSE SERPL-MCNC: 99 MG/DL (ref 83–110)
HGB BLD-MCNC: 10.4 G/DL (ref 14–18)
MCH RBC QN AUTO: 29.4 PG (ref 27–31)
MCV RBC AUTO: 93.3 FL (ref 80–94)
MDIFF COMPLETE?: YES
PLATELET # BLD AUTO: 202 THOU/UL (ref 130–400)
POTASSIUM SERPL-SCNC: 5 MMOL/L (ref 3.5–5.1)
RBC # BLD AUTO: 3.53 MILL/UL (ref 4.7–6.1)
SODIUM SERPL-SCNC: 137 MMOL/L (ref 136–145)
WBC # BLD AUTO: 7.6 THOU/UL (ref 4.8–10.8)

## 2018-02-03 RX ADMIN — INSULIN LISPRO PRN UNIT: 100 INJECTION, SOLUTION INTRAVENOUS; SUBCUTANEOUS at 16:33

## 2018-02-03 RX ADMIN — PANTOPRAZOLE SODIUM SCH MG: 40 GRANULE, DELAYED RELEASE ORAL at 09:37

## 2018-02-03 RX ADMIN — Medication SCH ML: at 20:54

## 2018-02-03 RX ADMIN — SCOPALAMINE SCH: 1 PATCH, EXTENDED RELEASE TRANSDERMAL at 09:40

## 2018-02-03 RX ADMIN — Medication SCH ML: at 09:39

## 2018-02-03 NOTE — PDOC.PN
- Subjective


Encounter Start Date: 02/03/18


Encounter Start Time: 12:45


Subjective: no sob or palp


-: is sitting in chair and feeding


-: not fully oriented





- Objective


Resuscitation Status: 


 











Resuscitation Status           DNR:Do Not Resuscitate














MAR Reviewed: Yes


Vital Signs & Weight: 


 Vital Signs (12 hours)











  Temp Pulse Resp Pulse Ox


 


 02/03/18 12:00  98.1 F   


 


 02/03/18 08:00  98.7 F  80  24 H  98


 


 02/03/18 06:49     96


 


 02/03/18 06:47   74  18  96


 


 02/03/18 04:00  97.9 F   








 Weight











Admit Weight                   158 lb


 


Weight                         132 lb 8 oz











 Most Recent Monitor Data











Heart Rate from ECG            77


 


NIBP                           110/46


 


NIBP BP-Mean                   73


 


Respiration from ECG           19


 


SpO2                           96














I&O: 


 











 02/02/18 02/03/18 02/04/18





 06:59 06:59 06:59


 


Intake Total 783.2 1853.6 720


 


Output Total 2240 1598 720


 


Balance -1456.8 255.6 0











Result Diagrams: 


 02/03/18 03:47





 02/03/18 03:47


Additional Labs: 


 Accuchecks











  02/03/18 02/03/18 02/02/18





  12:07 05:14 21:11


 


POC Glucose  126 H  90  170 H














  02/02/18





  16:24


 


POC Glucose  174 H














Phys Exam





- Physical Examination


HEENT: PERRLA, moist MMs


Neck: no JVD, supple


Respiratory: no wheezing, no rales


Cardiovascular: no significant murmur, irregular


Gastrointestinal: soft, non-tender, positive bowel sounds


Musculoskeletal: no edema, pulses present


Neurological: non-focal, moves all 4 limbs





Dx/Plan


(1) COPD (chronic obstructive pulmonary disease)


Status: Chronic   


Qualifiers: 


   COPD type: unspecified COPD   Qualified Code(s): J44.9 - Chronic obstructive 

pulmonary disease, unspecified   


Comment: Stable O2 requirements. Not in exacerbartion. Continue RT, nebs


   





(2) Acute on chronic diastolic (congestive) heart failure


Code(s): I50.33 - ACUTE ON CHRONIC DIASTOLIC (CONGESTIVE) HEART FAILURE   Status

: Resolved   





(3) Anemia, normocytic normochromic


Code(s): D64.9 - ANEMIA, UNSPECIFIED   Status: Chronic   





(4) Aortic stenosis, moderate


Code(s): I35.0 - NONRHEUMATIC AORTIC (VALVE) STENOSIS   Status: Chronic   





(5) Coronary artery disease


Code(s): I25.10 - ATHSCL HEART DISEASE OF NATIVE CORONARY ARTERY W/O ANG PCTRS 

  Status: Chronic   


Qualifiers: 


   Coronary Disease-Associated Artery/Lesion type: due to calcified coronary 

lesion   Qualified Code(s): I25.10 - Atherosclerotic heart disease of native 

coronary artery without angina pectoris; I25.84 - Coronary atherosclerosis due 

to calcified coronary lesion; I25.84 - Coronary atherosclerosis due to 

calcified coronary lesion; I25.84 - Coronary atherosclerosis due to calcified 

coronary lesion   


Comment: CATH showed inoperable lesion (~90% blockage L main, calcified)


Very poor prognosis as patient's clinical condition precluded CABG.


Cardiology discussed extensively with family. Possible hospice discussed.


Will f/u with family. 





   





(6) Acute hypoxemic respiratory failure


Code(s): J96.01 - ACUTE RESPIRATORY FAILURE WITH HYPOXIA   Status: Resolved   

Comment: Stable. Extubated 1/31/ 


He had been reintubated s/p cardiac arrest 1/23.   





(7) Cardiac arrest


Code(s): I46.9 - CARDIAC ARREST, CAUSE UNSPECIFIED   Status: Resolved   Comment

: V-fib , converted with defibrilation to sinus    





(8) Demand ischemia of myocardium


Code(s): I24.8 - OTHER FORMS OF ACUTE ISCHEMIC HEART DISEASE   Status: Resolved

   Comment: Symptom Free. 2/2 Cardiac arrest. 


Cardiology on board. 


Would like to discuss with patient's spouse to explain risk of proceeding with 

catheterization


   





(9) Afib


Code(s): I48.91 - UNSPECIFIED ATRIAL FIBRILLATION   Status: Acute   





- Plan


is on cardizem drip at 5mg/hr


-: full dose lovenox, lasix 40mg iv q12h


-: solumedrol daily


-: PT to mobilize pt as tolerated


-: prognosis guarded with left main lesion





* .








Review of Systems





- Medications/Allergies


Allergies/Adverse Reactions: 


 Allergies











Allergy/AdvReac Type Severity Reaction Status Date / Time


 


No Known Drug Allergies Allergy   Unverified 01/15/18 03:39











Medications: 


 Current Medications





Acetaminophen (Tylenol)  500 mg PO Q6H PRN


   PRN Reason: Headache/Fever or Pain


   Last Admin: 01/22/18 02:44 Dose:  500 mg


Albuterol/Ipratropium (Duoneb)  3 ml NEB [id number] SHAUN


   Last Admin: 02/03/18 06:47 Dose:  3 ml


Aspirin (Aspirin Chewable)  81 mg PO DAILY [facility]


   Last Admin: 02/03/18 09:37 Dose:  81 mg


Bisacodyl (Dulcolax)  10 mg [place] DAILYPRN PRN


   PRN Reason: Constipation


Dextrose/Water (Dextrose 50%)  25 gm SLOW IVP PRN PRN


   PRN Reason: Hypoglycemia


   Last Admin: 01/30/18 06:25 Dose:  25 gm


Diltiazem HCl (Cardizem)  60 mg PO TID [facility]


   Last Admin: 02/03/18 14:36 Dose:  60 mg


Enoxaparin Sodium (Lovenox)  60 mg SC 0900,2100 [facility]


   Last Admin: 02/03/18 09:38 Dose:  60 mg


Ferrous Sulfate (Ferrous Sulfulte)  300 mg PO BID-WM [facility]


   Last Admin: 02/03/18 09:38 Dose:  300 mg


Furosemide (Lasix)  40 mg SLOW IVP 0600,1400 [facility]


   Last Admin: 02/03/18 14:36 Dose:  40 mg


Glucagon (Glucagon)  1 mg IM PRN PRN


   PRN Reason: Hypoglycemia


Guaifenesin (Organ-I Nr)  400 mg PO Q4H [facility]


   Last Admin: 02/03/18 13:21 Dose:  400 mg


Potassium Chloride 40 meq/ (Sodium Chloride)  270 mls @ 135 mls/hr IVPB ASDIR 

PRN


   PRN Reason: FOR SERUM K+ 2.5 - 3.5


   Last Admin: 01/30/18 07:13 Dose:  270 mls


Potassium Chloride 40 meq/ (Device)  100 mls @ 50 mls/hr IVPB ASDIR PRN


   PRN Reason: FOR SERUM K+ 2.5 - 3.5


Magnesium Sulfate 1 gm/ Sodium (Chloride)  102 mls @ 102 mls/hr IV PRN PRN


   PRN Reason: MAG LEVEL 1.4 - 2.0


Magnesium Sulfate 2 gm/ Device  100 mls @ 100 mls/hr IVPB ASDIR PRN


   PRN Reason: MAGNESIUM < 1.4


Potassium Phosphate 9 mmol/ (Sodium Chloride)  103 mls @ 25.75 mls/hr IVPB 

ASDIR PRN


   PRN Reason: Phosphate 1.0-1.8


Potassium Phosphate 12 mmol/ (Sodium Chloride)  254 mls @ 63.5 mls/hr IV ASDIR 

PRN


   PRN Reason: Serum phosphate 0.5-0.9


Potassium Phosphate 15 mmol/ (Sodium Chloride)  255 mls @ 63.75 mls/hr IV ASDIR 

PRN


   PRN Reason: Serum Phos < 0.5


Dextrose/Water (D5w)  1,000 mls @ 0 mls/hr IV .Q0M PRN; As Directed


   PRN Reason: Hypoglycemia


Diltiazem HCl 125 mg/ Sodium (Chloride)  125 mls @ 5 mls/hr IVPB INF SHAUN; 5 MG/

HR


   PRN Reason: Protocol


   Last Admin: 02/03/18 03:44 Dose:  125 mls


Insulin Human Lispro (Humalog)  0 units SC .MILD SLIDING SCALE PRN


   PRN Reason: Mild Correctional Scale


   Last Admin: 02/02/18 21:14 Dose:  2 unit


Insulin Human Lispro (Humalog)  0 units SC .BEDTIME SLIDING SC PRN


   PRN Reason: Bedtime Correctional Scale


Magnesium Oxide (Magnesium Oxide)  400 mg PO BIDPRN PRN


   PRN Reason: FOR SERUM MAG 1.4 - 2.0


Magnesium Oxide (Magnesium Oxide)  800 mg PO PRN PRN


   PRN Reason: FOR SERUM MAG < 1.4


Methylprednisolone Sodium Succinate (Solu-Medrol)  40 mg IVP DAILY [facility]


   Last Admin: 02/03/18 09:38 Dose:  40 mg


Miscellaneous Medication (Phos-Nak)  1 pkt PO TIDPRN PRN


   PRN Reason: FOR PHOS LEVEL 1.0 - 1.8


Miscellaneous Medication (Phos-Nak)  2 pkt PO TIDPRN PRN


   PRN Reason: FOR PHOS LEVEL 0.5 - 1.0


Pantoprazole Sodium (Protonix)  40 mg PO DAILY [facility]


   Last Admin: 02/03/18 09:37 Dose:  40 mg


Polyethylene Glycol (Miralax)  17 gm PO DAILY [facility]


   Last Admin: 02/03/18 09:38 Dose:  17 gm


Potassium Chloride (K-Dur)  40 meq PO ASDIR PRN


   PRN Reason: FOR SERUM K+  2.5 - 3.5


Potassium Chloride (Klor-Con)  40 meq PER TUBE ASDIR PRN


   PRN Reason: FOR SERUM K+ 2.5-3.5


   Last Admin: 02/01/18 07:57 Dose:  40 meq


Potassium Chloride (Klor-Con)  20 meq PO BID-[facility]


   Last Admin: 02/03/18 09:37 Dose:  20 meq


Propofol (Diprivan)  1,000 mg IV INF PRN; Protocol


   PRN Reason: TO ACHIEVE WALDEN SCORE 2-3


   Stop: 02/14/18 09:48


   Last Admin: 01/30/18 11:42 Dose:  1,000 mg


Scopolamine (Transderm Scop)  1.5 mg TD Q3D [facility]


   Last Admin: 02/03/18 09:40 Dose:  Not Given


Sodium Chloride (Flush - Normal Saline)  10 ml IVF Q12HR [facility]


   Last Admin: 02/03/18 09:39 Dose:  10 ml


Sodium Chloride (Flush - Normal Saline)  10 ml IVF PRN PRN


   PRN Reason: Saline Flush

## 2018-02-03 NOTE — PRG
DATE OF SERVICE:  02/0E/2018

 

SERVICE:  Pulmonary Medicine.

 

INTERVAL HISTORY:  The patient is doing fairly well from a respiratory standpoint.  This morning, he 
continues to remain confused.  That being said, his mentation is a little bit better today.  His stre
ngth is also improved.  I think he could probably participate with physical therapy.  He does not kno
w where he is and what the circumstances are.  He denies any chest pain or shortness of breath curren
tly.

 

PHYSICAL EXAMINATION:

VITAL SIGNS:  Afebrile, pulse 74, blood pressure 121/40, respirations 18, saturation 96% on 2 liters 
nasal cannula.

GENERAL:  Patient is awake, alert, in no apparent distress.

LUNGS:  Decent air entry.  There are crackles present.

HEART:  Normal rate, regular.

ABDOMEN:  Soft, nontender, nondistended.  Bowel sounds are positive.

MUSCULOSKELETAL:  No cyanosis or clubbing.  There is no pitting in the bilateral lower extremities.

NEUROLOGIC:  Grossly nonfocal.

 

LABORATORY DATA:  WBC 7.6, hemoglobin 10.4 and stable, platelets 202,000.  Basic metabolic profile is
 completely unremarkable with potassium 5.0 and creatinine 0.74.  Body fluid culture was consistent w
ith a transudate.  Culture is negative to date.

 

ASSESSMENT:

1.  Chronic hypoxic respiratory failure.

2.  Status post ventricular fibrillation and ventricular tachycardia arrest.

3.  Community-acquired pneumonia.

4.  Coronary artery disease, severe, with 95% left main lesion.

5.  Deconditioning, severe.

 

PLAN:  The patient will remain in the ICU until we decide what to do about this left main lesion.  In
 the meantime, physical therapy will be invited to help us make certain the patient does not get furt
her deconditioned.  We will try to move him as best we can.  Oxygen will be weaned as tolerated.  Miguel Ángel
t being said, he is on home O2 and so it would be reasonable to leave him on a low rate.  Pulmonary o
r Critical Care will continue to follow while he remains in this location.

## 2018-02-04 LAB
ANION GAP SERPL CALC-SCNC: 11 MMOL/L (ref 10–20)
BUN SERPL-MCNC: 23 MG/DL (ref 8.4–25.7)
CALCIUM SERPL-MCNC: 9.5 MG/DL (ref 7.8–10.44)
CHLORIDE SERPL-SCNC: 97 MMOL/L (ref 98–107)
CO2 SERPL-SCNC: 31 MMOL/L (ref 23–31)
CREAT CL PREDICTED SERPL C-G-VRATE: 71 ML/MIN (ref 70–130)
GLUCOSE SERPL-MCNC: 94 MG/DL (ref 83–110)
HGB BLD-MCNC: 9.8 G/DL (ref 14–18)
MCH RBC QN AUTO: 29.2 PG (ref 27–31)
MCV RBC AUTO: 92.7 FL (ref 80–94)
MDIFF COMPLETE?: YES
PLATELET # BLD AUTO: 254 THOU/UL (ref 130–400)
POTASSIUM SERPL-SCNC: 4.4 MMOL/L (ref 3.5–5.1)
RBC # BLD AUTO: 3.37 MILL/UL (ref 4.7–6.1)
SODIUM SERPL-SCNC: 135 MMOL/L (ref 136–145)
WBC # BLD AUTO: 8.7 THOU/UL (ref 4.8–10.8)

## 2018-02-04 RX ADMIN — PANTOPRAZOLE SODIUM SCH MG: 40 GRANULE, DELAYED RELEASE ORAL at 10:54

## 2018-02-04 RX ADMIN — Medication SCH ML: at 11:24

## 2018-02-04 RX ADMIN — Medication SCH ML: at 20:44

## 2018-02-04 NOTE — PDOC.PN
- Subjective


Encounter Start Date: 02/04/18


Encounter Start Time: 11:50





Pt states breatihng is a little easier today.  Was up with PT earlier, walked 

form bed to window, to other window.  + HERNANDEZ, no n/V/D/C, no F/C, coughing, but 

non-productive





10 point ROS performed and neg for all systems except as above





- Objective


Resuscitation Status: 


 











Resuscitation Status           DNR:Do Not Resuscitate














MAR Reviewed: Yes


Vital Signs & Weight: 


 Vital Signs (12 hours)











  Temp Pulse Resp Pulse Ox


 


 02/04/18 11:38   85  22 H 


 


 02/04/18 08:00  97.6 F  85  22 H  96


 


 02/04/18 07:47     87 L


 


 02/04/18 07:41   70  19  87 L


 


 02/04/18 07:00  97.6 F   


 


 02/04/18 04:00  98.1 F   








 Weight











Admit Weight                   158 lb


 


Weight                         132 lb 3.2 oz











 Most Recent Monitor Data











Heart Rate from ECG            75


 


NIBP                           94/37


 


NIBP BP-Mean                   80


 


Respiration from ECG           30


 


SpO2                           91














I&O: 


 











 02/03/18 02/04/18 02/05/18





 06:59 06:59 06:59


 


Intake Total 1853.6 1519.8 480


 


Output Total 1598 1635 805


 


Balance 255.6 -115.2 -325











Result Diagrams: 


 02/04/18 03:50





 02/04/18 03:50


Additional Labs: 


 Accuchecks











  02/04/18 02/03/18 02/03/18





  13:52 21:07 16:27


 


POC Glucose  95  131 H  151 H











Radiology Reviewed by me: Yes


EKG Reviewed by me: Yes





Phys Exam





- Physical Examination


Constitutional: NAD


HEENT: PERRLA, moist MMs, sclera anicteric, oral pharynx no lesions


Neck: no nodes, no JVD, supple, full ROM


prolonged expiration, + high-pitched exp wheezes, no crackles


Cardiovascular: no significant murmur, no rub


tachy 100 to 133 back to 100


Gastrointestinal: soft, non-tender, no distention, positive bowel sounds


Musculoskeletal: pulses present, edema present


Neurological: non-focal, normal sensation, moves all 4 limbs


Lymphatic: no nodes


Psychiatric: normal affect, A&O x 3


Skin: no rash, normal turgor, cap refill <2 seconds





Dx/Plan


(1) Acute hypoxemic respiratory failure


Code(s): J96.01 - ACUTE RESPIRATORY FAILURE WITH HYPOXIA   Status: Resolved   

Comment: Stable. Extubated 1/31


He had been reintubated s/p cardiac arrest 1/23.   





(2) PNA (pneumonia)


Code(s): J18.9 - PNEUMONIA, UNSPECIFIED ORGANISM   Status: Resolved   


Qualifiers: 


   Pneumonia type: due to unspecified organism   Laterality: bilateral   Lung 

location: unspecified part of lung   Qualified Code(s): J18.9 - Pneumonia, 

unspecified organism   


Comment: Resolved.


Septic on admission, placed on vancomycin and Cefepime/ Cx negative. 


No significant cx growths. 


Vancomycin and Cefepime discontinued 1/31   





(3) COPD (chronic obstructive pulmonary disease)


Status: Chronic   


Qualifiers: 


   COPD type: unspecified COPD   Qualified Code(s): J44.9 - Chronic obstructive 

pulmonary disease, unspecified   


Comment: Stable O2 requirements. Not in exacerbartion. Continue RT, nebs


   





(4) Physical deconditioning


Code(s): R53.81 - OTHER MALAISE   Status: Acute   





- Plan


cont current plan of care, continue antibiotics, PT/OT, respiratory therapy, 

out of bed/ambulate, DVT proph w/lovenox





* .

## 2018-02-04 NOTE — PRG
DATE OF SERVICE:  02/04/2018

 

SERVICE:  Pulmonary Medicine.

 

INTERVAL HISTORY:  The patient doing really well from a respiratory standpoint.  He is breathing comf
ortably.  Yesterday, he actually fell out of his chair.  Today we have gotten him back up into the ch
air.  Otherwise, he is a little bit confused.  He does not recognize how weak he is and continues to 
make attempts and moving.  He denies any overnight events.  Nursing reports no events.

 

PHYSICAL EXAMINATION:

VITAL SIGNS:  Afebrile, pulse 70, blood pressure 106/48, respirations 19, saturation 92% on 2 liters 
nasal cannula.

GENERAL:  The patient is awake, alert, in no apparent distress.

LUNGS:  Reduced air entry with prolonged expiratory phase.  There is expiratory wheezing identified. 
 No rhonchi or crackles are appreciated.

HEART:  Normal rate, irregular.

ABDOMEN:  Soft, nontender, nondistended.  Bowel sounds are positive.

MUSCULOSKELETAL:  No cyanosis or clubbing.  There is minimal pitting in the bilateral lower extremiti
es.

NEUROLOGIC:  Grossly nonfocal.

 

LABORATORY DATA:  WBC 8.7, hemoglobin 9.8, platelets 254,000.  Basic metabolic profile is essentially
 unremarkable.  His chloride is 97.  Urinalysis is essentially unremarkable.  One out of two blood cu
ltures growing presumptive micrococcus.  Body fluid cultures negative.

 

ASSESSMENT:

1.  Chronic hypoxic respiratory failure.

2.  Status post ventricular fibrillation and ventricular tachycardia arrest.

3.  Coronary artery disease, severe, with 95% left main lesion.

4.  Community acquired pneumonia, improving.

5.  Deconditioning, severe.

 

PLAN:  At this point, the patient remains stable for transition out of the ICU to the telemetry unit.
  Unfortunately, he is going to require _____ at risk.  Pulmonary Critical Care will continue to foll
ow while the patient remains in this location.  I am going to stop his daily laboratories and we will
 continue focusing efforts on mobilizing the patient.

## 2018-02-05 RX ADMIN — Medication SCH ML: at 10:31

## 2018-02-05 RX ADMIN — INSULIN LISPRO PRN UNIT: 100 INJECTION, SOLUTION INTRAVENOUS; SUBCUTANEOUS at 17:08

## 2018-02-05 RX ADMIN — Medication SCH ML: at 22:14

## 2018-02-05 RX ADMIN — PANTOPRAZOLE SODIUM SCH MG: 40 GRANULE, DELAYED RELEASE ORAL at 10:31

## 2018-02-05 NOTE — PRG
DATE OF SERVICE:  02/05/2018

 

Kleber Montelongo is more lucid.

 

OBJECTIVE:

VITAL SIGNS:  Today, his heart rate is 79, respiratory rate is in the 20s, oximetry is 94, blood pres
sure 120/39.  Intake and output is negative 503.

LUNGS:  Clear.

HEART:  Regular rhythm.

ABDOMEN:  Soft.

 

IMPRESSION:

1.  Status post respiratory failure what appeared to be pneumonia.

2.  Calcified left main stenosis.

3.  Underlying obstructive lung disease with severe.

4.  Acute on chronic respiratory failure with chronic hypoxemia on oxygen at home.

5.  Chronic atrial fibrillation.

 

Because of his deconditioning and inactivity, I do not think he could survive the surgery.  He is jus
t a little bit better than bedridden and prior to this admission, he could only get in from the [facility] lot if he was in a scooter.  I really do not feel he would tolerate median sternotomy for bypass s
urgery.  I do not doubt that he would survive surgery, but weaning from mechanical ventilation will l
ikely be very prolonged.  I will discuss with him again, but Cardiothoracic Surgery thought his funct
ional status precluded an operative procedure was well.

## 2018-02-05 NOTE — ADD-PRG
ADDENDUM

 

DATE OF SERVICE:  02/05/2018

 

Discussed the case of Mr. Montelongo with the physicians at [facility] in [place].  They have agreed
 to accept the patient and transfer.  Consideration for high risk intervention will need to be given.
  The patient has severely calcified left main stenosis.  Not an operative candidate for thoracic kate
maria luz here at this point.

## 2018-02-05 NOTE — PRG
DATE OF SERVICE:  02/05/2018

 

SUBJECTIVE:  Mr. Montelongo is more awake and alert today.

 

He has no chest pain, no shortness of breath.

 

PHYSICAL EXAMINATION:

VITAL SIGNS:  Blood pressure 124/45, pulse is , it is irregular.

LUNGS:  Clear.

CARDIAC:  Irregularly irregular.

ABDOMEN:  Soft, nontender.

EXTREMITIES:  No clubbing or cyanosis.  There is no edema.

 

ASSESSMENT:

1.  Left main stenosis, calcified.

2.  Chronic obstructive pulmonary disease.

3.  Atrial fibrillation, chronic.

 

PLAN:

1.  Increased diltiazem.

2.  We will give him a single dose of digoxin.

3.  We will discuss again with Dr. Jerome.  He looks better now, whether he could be reconsidered for 
surgery, if not, we need to try to consider whether he would be a candidate for high risk interventio
n.  He is much more awake and alert now.

## 2018-02-05 NOTE — PDOC.PN
- Subjective


Encounter Start Date: 02/05/18


Encounter Start Time: 18:20


Subjective: f/u for acute/chronic hypoxic resp failure with severe L main CAD. 

Remains


-: high risk for surgical intervention but apparently will be evaluated at [facility]


-: [place] for surgical options. 





- Objective


Resuscitation Status: 


 











Resuscitation Status           DNR:Do Not Resuscitate














MAR Reviewed: Yes


Vital Signs & Weight: 


 Vital Signs (12 hours)











  Temp Pulse Resp Pulse Ox


 


 02/05/18 22:21   78  20  95


 


 02/05/18 20:00  98.8 F  74  20  98


 


 02/05/18 19:03   70  15  98


 


 02/05/18 16:00  98.2 F   


 


 02/05/18 15:41   79  25 H  94 L


 


 02/05/18 12:00  98.0 F   


 


 02/05/18 11:15   89  25 H  98








 Weight











Admit Weight                   158 lb


 


Weight                         143 lb 8.335 oz











 Most Recent Monitor Data











Heart Rate from ECG            81


 


NIBP                           121/41


 


NIBP BP-Mean                   98


 


Respiration from ECG           21


 


SpO2                           94














I&O: 


 











 02/04/18 02/05/18 02/06/18





 06:59 06:59 06:59


 


Intake Total 1519.8 840 560


 


Output Total 1635 1343 100


 


Balance -115.2 -503 460











Result Diagrams: 


 02/04/18 03:50





 02/04/18 03:50


Additional Labs: 


 Accuchecks











  02/05/18 02/05/18





  17:07 05:20


 


POC Glucose  180 H  78











EKG Reviewed by me: Yes (Tele -A-fib with variable rate)





Phys Exam





- Physical Examination


Constitutional: NAD


HEENT: PERRLA, oral pharynx no lesions


Neck: no JVD, supple


exp wheezes, diminished in bases


Respiratory: wheezing present


Cardiovascular: irregular


Gastrointestinal: soft, non-tender, no distention, positive bowel sounds


Musculoskeletal: pulses present, edema present


Neurological: normal sensation, moves all 4 limbs


Psychiatric: A&O x 3


Skin: normal turgor, cap refill <2 seconds





Dx/Plan


(1) Acute hypoxemic respiratory failure


Code(s): J96.01 - ACUTE RESPIRATORY FAILURE WITH HYPOXIA   Status: Resolved   

Comment: Stable. Extubated 1/31


He had been reintubated s/p cardiac arrest 1/23, currently on RA   





(2) Afib


Code(s): I48.91 - UNSPECIFIED ATRIAL FIBRILLATION   Status: Acute   


Qualifiers: 


   Atrial fibrillation type: chronic   Qualified Code(s): I48.2 - Chronic 

atrial fibrillation   


Comment: Rate variable, continue Cardizem, Lovenox   





(3) Hypokalemia


Code(s): E87.6 - HYPOKALEMIA   Status: Acute   Comment: Repleted. 


Likely 2/2 diuretics., Resolved


   





(4) Physical deconditioning


Code(s): R53.81 - OTHER MALAISE   Status: Chronic   Comment: PT for 

mobilization and functional assessment   





(5) Coronary artery disease


Code(s): I25.10 - ATHSCL HEART DISEASE OF NATIVE CORONARY ARTERY W/O ANG PCTRS 

  Status: Chronic   


Qualifiers: 


   Coronary Disease-Associated Artery/Lesion type: due to calcified coronary 

lesion   Qualified Code(s): I25.10 - Atherosclerotic heart disease of native 

coronary artery without angina pectoris; I25.84 - Coronary atherosclerosis due 

to calcified coronary lesion; I25.84 - Coronary atherosclerosis due to 

calcified coronary lesion; I25.84 - Coronary atherosclerosis due to calcified 

coronary lesion   


Comment: CATH showed inoperable lesion (~90% blockage L main, calcified)


Very poor prognosis as patient's clinical condition precluded CABG. Apparently [facility] [place] willing to accept pt in transfer for operative mgmt.





   





(6) Acute on chronic diastolic (congestive) heart failure


Code(s): I50.33 - ACUTE ON CHRONIC DIASTOLIC (CONGESTIVE) HEART FAILURE   Status

: Resolved   Comment: Stabilizing, continue Lasix 40mg daily   





(7) PNA (pneumonia)


Code(s): J18.9 - PNEUMONIA, UNSPECIFIED ORGANISM   Status: Resolved   


Qualifiers: 


   Pneumonia type: due to unspecified organism   Laterality: bilateral   Lung 

location: unspecified part of lung   Qualified Code(s): J18.9 - Pneumonia, 

unspecified organism   


Comment: Resolved.


Septic on admission, placed on vancomycin and Cefepime/ Cx negative. 


No significant cx growths. 


Vancomycin and Cefepime discontinued 1/31   





(8) Sepsis with acute organ dysfunction


Code(s): A41.9 - SEPSIS, UNSPECIFIED ORGANISM; R65.20 - SEVERE SEPSIS WITHOUT 

SEPTIC SHOCK   Status: Resolved   Comment: Vancomycin and Cefepime initially 

now d/c'd   





- Plan


PT/OT, , respiratory therapy, out of bed/ambulate, DVT proph w/

SCDs


Continue supportive mgmt


-: Continue Cardizem 240mg daily


-: Lovenox 60mg SC q12h


-: Continue ASA 81mg daily


-: Likely can transfer to [facility] [place] in 24h





* .

## 2018-02-06 VITALS — SYSTOLIC BLOOD PRESSURE: 127 MMHG | DIASTOLIC BLOOD PRESSURE: 82 MMHG

## 2018-02-06 VITALS — TEMPERATURE: 97.5 F

## 2018-02-06 NOTE — PRG
DATE OF SERVICE:  02/06/2018

 

The patient was about to be transferred this morning.  I Was alerted there is some swelling of his ri
ght thigh.

 

He has no chest pain or pressure.

 

PHYSICAL EXAMINATION: 

VITAL SIGNS:  His blood pressure was 120 systolic.  Pulse 80-90, it is irregular.

LUNGS:  Clear.

CARDIAC:  Irregularly irregular.  

EXTREMITIES;  The right thigh is swollen.  

 

Reviewing the study, he had a venous ultrasound done last night which showed no clots, but probably h
ematoma.

 

ASSESSMENT:

1.  Left main stenosis, inoperable.

2.  Chronic atrial fibrillation.

3.  Previous pacemaker insertion.

4.  Hematoma, suspect arterial source.

 

PLAN:  The patient was about to be transferred as we speak.  He will need an ultrasound of the right 
femoral to see if has a pseudoaneurysm, may need further attention with that.  The patient has been o
n Lovenox that was held the day of the cath and the day after and resumed the next day, but he may ha
ve developed a pseudoaneurysm that needs to be evaluated.  I have handwritten that in the note.

## 2018-02-06 NOTE — ULT
VENOUS DOPPLER  ULTRASOUND OF THE RIGHT LOWER EXTREMITY:

 

Date;  02/05/19 

 

HISTORY:  

Right lower extremity edema and pain, recent catheterization, swelling in the right upper anterior th
igh. 

 

TECHNIQUE:  

Gray scale ultrasound with color flow and spectral Doppler imaging of the deep venous systems of the 
right lower extremity is performed. 

 

FINDINGS:

There is good flow, compression, and augmentation noted in the right common femoral, femoral, deep fe
moral, popliteal, posterior tibial, and greater saphenous veins. 

 

There is a heterogeneous mass in the right upper anterior thigh measuring 14.0 x 6.1 x 6.1 cm, which 
is within the muscle, suspicious for hematoma. 

 

IMPRESSION: 

No evidence of deep venous thrombosis in the right lower extremity. 

 

POS: [facility]

## 2018-02-10 NOTE — EKG
Test Reason : UNCONSCIOUS

Blood Pressure : ***/*** mmHG

Vent. Rate : 090 BPM     Atrial Rate : 098 BPM

   P-R Int : 000 ms          QRS Dur : 100 ms

    QT Int : 402 ms       P-R-T Axes : 000 056 -83 degrees

   QTc Int : 491 ms

 

Atrial fibrillation





Prolonged QT

Abnormal ECG

 

Confirmed by DARRELL MACHUCA ([id number]),  MELANIE KELLEY ([id number]) on 2/10/2018 5:33:25 PM

 

Referred By:             Confirmed By:DARRELL MACHUCA

## 2018-02-19 ENCOUNTER — HOSPITAL ENCOUNTER (EMERGENCY)
Dept: HOSPITAL 92 - ERS | Age: 78
Discharge: HOME | End: 2018-02-19
Payer: MEDICARE

## 2018-02-19 DIAGNOSIS — I48.91: ICD-10-CM

## 2018-02-19 DIAGNOSIS — K29.70: Primary | ICD-10-CM

## 2018-02-19 DIAGNOSIS — I10: ICD-10-CM

## 2018-02-19 DIAGNOSIS — Z87.891: ICD-10-CM

## 2018-02-19 DIAGNOSIS — J44.9: ICD-10-CM

## 2018-02-19 DIAGNOSIS — I25.10: ICD-10-CM

## 2018-02-19 DIAGNOSIS — E11.9: ICD-10-CM

## 2018-02-19 LAB
ALBUMIN SERPL BCG-MCNC: 2.9 G/DL (ref 3.4–4.8)
ALP SERPL-CCNC: 99 U/L (ref 40–150)
ALT SERPL W P-5'-P-CCNC: 14 U/L (ref 8–55)
ANION GAP SERPL CALC-SCNC: 10 MMOL/L (ref 10–20)
APTT PPP: 44.5 SEC (ref 22.9–36.1)
AST SERPL-CCNC: 20 U/L (ref 5–34)
BASOPHILS # BLD AUTO: 0.1 THOU/UL (ref 0–0.2)
BASOPHILS NFR BLD AUTO: 0.7 % (ref 0–1)
BILIRUB SERPL-MCNC: 0.9 MG/DL (ref 0.2–1.2)
BUN SERPL-MCNC: 22 MG/DL (ref 8.4–25.7)
CALCIUM SERPL-MCNC: 8.8 MG/DL (ref 7.8–10.44)
CHLORIDE SERPL-SCNC: 102 MMOL/L (ref 98–107)
CO2 SERPL-SCNC: 27 MMOL/L (ref 23–31)
CREAT CL PREDICTED SERPL C-G-VRATE: 0 ML/MIN (ref 70–130)
CRYSTAL-AUWI FLAG: 0 (ref 0–15)
EOSINOPHIL # BLD AUTO: 0 THOU/UL (ref 0–0.7)
EOSINOPHIL NFR BLD AUTO: 0.3 % (ref 0–10)
GLOBULIN SER CALC-MCNC: 2.1 G/DL (ref 2.4–3.5)
GLUCOSE SERPL-MCNC: 115 MG/DL (ref 83–110)
HEV IGM SER QL: 4.7 (ref 0–7.99)
HGB BLD-MCNC: 10.4 G/DL (ref 14–18)
HYALINE CASTS #/AREA URNS LPF: (no result) LPF
INR PPP: 2.3
LIPASE SERPL-CCNC: 85 U/L (ref 8–78)
LYMPHOCYTES # BLD: 3.1 THOU/UL (ref 1.2–3.4)
LYMPHOCYTES NFR BLD AUTO: 25.3 % (ref 21–51)
MCH RBC QN AUTO: 29.8 PG (ref 27–31)
MCV RBC AUTO: 93.9 FL (ref 80–94)
MONOCYTES # BLD AUTO: 0.6 THOU/UL (ref 0.11–0.59)
MONOCYTES NFR BLD AUTO: 5 % (ref 0–10)
NEUTROPHILS # BLD AUTO: 8.3 THOU/UL (ref 1.4–6.5)
NEUTROPHILS NFR BLD AUTO: 68.7 % (ref 42–75)
PATHC CAST-AUWI FLAG: 1.08 (ref 0–2.49)
PLATELET # BLD AUTO: 277 THOU/UL (ref 130–400)
POTASSIUM SERPL-SCNC: 3.7 MMOL/L (ref 3.5–5.1)
PROTHROMBIN TIME: 26 SEC (ref 12–14.7)
RBC # BLD AUTO: 3.49 MILL/UL (ref 4.7–6.1)
SODIUM SERPL-SCNC: 135 MMOL/L (ref 136–145)
SP GR UR STRIP: 1.02 (ref 1–1.04)
SPERM-AUWI FLAG: 0 (ref 0–9.9)
WBC # BLD AUTO: 12.1 THOU/UL (ref 4.8–10.8)
YEAST FLD HPF-#/AREA: (no result) HPF
YEAST-AUWI FLAG: 198.6 (ref 0–25)

## 2018-02-19 PROCEDURE — 81015 MICROSCOPIC EXAM OF URINE: CPT

## 2018-02-19 PROCEDURE — 80053 COMPREHEN METABOLIC PANEL: CPT

## 2018-02-19 PROCEDURE — 85730 THROMBOPLASTIN TIME PARTIAL: CPT

## 2018-02-19 PROCEDURE — 94760 N-INVAS EAR/PLS OXIMETRY 1: CPT

## 2018-02-19 PROCEDURE — C9113 INJ PANTOPRAZOLE SODIUM, VIA: HCPCS

## 2018-02-19 PROCEDURE — 85610 PROTHROMBIN TIME: CPT

## 2018-02-19 PROCEDURE — 83690 ASSAY OF LIPASE: CPT

## 2018-02-19 PROCEDURE — 96374 THER/PROPH/DIAG INJ IV PUSH: CPT

## 2018-02-19 PROCEDURE — 85025 COMPLETE CBC W/AUTO DIFF WBC: CPT

## 2018-02-19 PROCEDURE — 81003 URINALYSIS AUTO W/O SCOPE: CPT

## 2018-02-28 ENCOUNTER — HOSPITAL ENCOUNTER (OUTPATIENT)
Dept: HOSPITAL 92 - SDC | Age: 78
Discharge: HOME | End: 2018-02-28
Attending: INTERNAL MEDICINE
Payer: MEDICARE

## 2018-02-28 DIAGNOSIS — I48.0: ICD-10-CM

## 2018-02-28 DIAGNOSIS — K44.9: ICD-10-CM

## 2018-02-28 DIAGNOSIS — I10: ICD-10-CM

## 2018-02-28 DIAGNOSIS — Z79.51: ICD-10-CM

## 2018-02-28 DIAGNOSIS — K25.7: Primary | ICD-10-CM

## 2018-02-28 DIAGNOSIS — Z79.01: ICD-10-CM

## 2018-02-28 DIAGNOSIS — Z79.899: ICD-10-CM

## 2018-02-28 DIAGNOSIS — E78.5: ICD-10-CM

## 2018-02-28 DIAGNOSIS — K21.9: ICD-10-CM

## 2018-02-28 DIAGNOSIS — Z79.02: ICD-10-CM

## 2018-02-28 DIAGNOSIS — I25.10: ICD-10-CM

## 2018-02-28 DIAGNOSIS — J44.9: ICD-10-CM

## 2018-02-28 DIAGNOSIS — I73.9: ICD-10-CM

## 2018-02-28 PROCEDURE — 0DJ08ZZ INSPECTION OF UPPER INTESTINAL TRACT, VIA NATURAL OR ARTIFICIAL OPENING ENDOSCOPIC: ICD-10-PCS | Performed by: INTERNAL MEDICINE

## 2018-02-28 PROCEDURE — 93010 ELECTROCARDIOGRAM REPORT: CPT

## 2018-02-28 PROCEDURE — 93005 ELECTROCARDIOGRAM TRACING: CPT

## 2018-02-28 PROCEDURE — 94640 AIRWAY INHALATION TREATMENT: CPT

## 2018-02-28 NOTE — OP
DATE OF PROCEDURE:  02/28/2018

 

PREOPERATIVE DIAGNOSIS:  A 77-year-old male with coffee-ground emesis a week ago.  The patient takes 
Plavix and also Xarelto.  The patient comes in for EGD, because of gastrointestinal bleeding.

 

POSTOPERATIVE DIAGNOSES:

1.  Normal esophagus and duodenum.

2.  Multiple gastric ulcers, 2 of the stomach, including fundus, gastric body, incisura angularis.

 

OPERATIVE PROCEDURE:  Esophagogastroduodenoscopy.

 

PROCEDURE NOTE:  The patient was placed on his left lateral position and the throat was anesthetized 
with Cetacaine spray and the patient was given sedation by Anesthesia Department.  A Pentax video gas
troscope under direct vision was passed down the oropharynx, past the GE junction, into the stomach a
nd subsequently into the descending duodenum.  The esophageal mucosa appeared normal.  The GE junctio
n, no pathology seen.  He does have a small hiatus hernia.  The fundus showed a gastric ulcer, which 
is quite large and also appears chronic.  He had multiple ulcers in the gastric body and also over th
e incisura angularis.   All the ulcers appears chronic and quite large.  The gastric antrum, no patho
logy seen.  The duodenal bulb, descending duodenum, no pathology seen.  No biopsy is taken and he is 
on two anticoagulation.  _____ .  The stomach was decompressed and the scope removed.

 

DISCHARGE RECOMMENDATIONS:

1.  Start the patient on either Nexium 40 once a day or Protonix 40 once a day.

2.  Risk of bleeding is very high within the presence of multiple gastric ulcers and patient being on
 Plavix and Xarelto.

## 2018-02-28 NOTE — EKG
Test Reason : PREOP

Blood Pressure : ***/*** mmHG

Vent. Rate : 077 BPM     Atrial Rate : 234 BPM

   P-R Int : 000 ms          QRS Dur : 078 ms

    QT Int : 388 ms       P-R-T Axes : 000 050 006 degrees

   QTc Int : 439 ms

 

Atrial fibrillation with frequent ventricular-paced complexes



Abnormal ECG

When compared with ECG of 15-SONJA-2018 02:52,

Electronic ventricular pacemaker has replaced Atrial fibrillation

Confirmed by DR. MARIA M FITGZERALD ([id number]) on 2/28/2018 7:08:51 PM

 

Referred By:  GREGORIO           Confirmed By:DR. MARIA M FITZGERALD

## 2018-02-28 NOTE — HP
DATE OF ADMISSION:  02/28/2018

 

HISTORY OF PRESENT ILLNESS:  This is a 77-year-old male referred to me by Dr. Upton.  He had a history of coffee ground emesis.  The patient came to the ER a 
week ago with  history of coffee-ground emesis.  He also had abdominal pain.  
The patient takes Xarelto.  The Xarelto has been discontinued.  The patient has 
history of COPD, atrial fibrillation and also history of coronary artery 
disease _____.  I did talk to Dr. Channing Silva, his cardiologist.  Dr. Silva 
feels it is safe to go for an EGD.  The patient brought to do an EGD, because 
of the above reason.

 

ALLERGIES:  None.

 

MEDICAL ILLNESSES:

1.  Paroxysmal atrial fibrillation.

2.  Peripheral vascular disease.

3.  Chronic obstructive pulmonary disease.

4.  Chronic acid reflux.

5.  Coronary artery disease with left mainstem occlusion.

6.  Hypertension.

7.  Hyperlipidemia.

 

PHYSICAL EXAMINATION:

GENERAL:  He is a fragile looking  male who appears comfortable.

VITAL SIGNS:  Stable.

CARDIOVASCULAR:  First and second heart sounds were normal.

LUNGS:  Clear to auscultation.

ABDOMEN:  Soft to palpate.  No organomegaly.  No tenderness.  No masses.

 

ADMITTING DIAGNOSIS:  Coffee ground emesis a week ago.

 

PLAN:  EGD.

 

MTDD

## 2018-03-19 ENCOUNTER — HOSPITAL ENCOUNTER (INPATIENT)
Dept: HOSPITAL 92 - ERS | Age: 78
LOS: 6 days | DRG: 698 | End: 2018-03-25
Attending: INTERNAL MEDICINE | Admitting: INTERNAL MEDICINE
Payer: MEDICARE

## 2018-03-19 VITALS — BODY MASS INDEX: 21.5 KG/M2

## 2018-03-19 LAB
ALBUMIN SERPL BCG-MCNC: 2.9 G/DL (ref 3.4–4.8)
ALP SERPL-CCNC: 105 U/L (ref 40–150)
ALT SERPL W P-5'-P-CCNC: 13 U/L (ref 8–55)
ANION GAP SERPL CALC-SCNC: 18 MMOL/L (ref 10–20)
AST SERPL-CCNC: 21 U/L (ref 5–34)
BASOPHILS # BLD AUTO: 0 THOU/UL (ref 0–0.2)
BASOPHILS NFR BLD AUTO: 0.3 % (ref 0–1)
BILIRUB SERPL-MCNC: 0.9 MG/DL (ref 0.2–1.2)
BUN SERPL-MCNC: 17 MG/DL (ref 8.4–25.7)
CALCIUM SERPL-MCNC: 9.1 MG/DL (ref 7.8–10.44)
CHLORIDE SERPL-SCNC: 98 MMOL/L (ref 98–107)
CO2 SERPL-SCNC: 23 MMOL/L (ref 23–31)
CREAT CL PREDICTED SERPL C-G-VRATE: 0 ML/MIN (ref 70–130)
CRYSTAL-AUWI FLAG: 0.2 (ref 0–15)
EOSINOPHIL # BLD AUTO: 0.1 THOU/UL (ref 0–0.7)
EOSINOPHIL NFR BLD AUTO: 0.5 % (ref 0–10)
GLOBULIN SER CALC-MCNC: 3.2 G/DL (ref 2.4–3.5)
GLUCOSE SERPL-MCNC: 60 MG/DL (ref 83–110)
HEV IGM SER QL: 0.1 (ref 0–7.99)
HGB BLD-MCNC: 11.2 G/DL (ref 14–18)
HYALINE CASTS #/AREA URNS LPF: (no result) LPF
LIPASE SERPL-CCNC: 15 U/L (ref 8–78)
LYMPHOCYTES # BLD: 2.2 THOU/UL (ref 1.2–3.4)
LYMPHOCYTES NFR BLD AUTO: 13.6 % (ref 21–51)
MCH RBC QN AUTO: 28.2 PG (ref 27–31)
MCV RBC AUTO: 90.6 FL (ref 80–94)
MONOCYTES # BLD AUTO: 0.8 THOU/UL (ref 0.11–0.59)
MONOCYTES NFR BLD AUTO: 4.8 % (ref 0–10)
NEUTROPHILS # BLD AUTO: 13.3 THOU/UL (ref 1.4–6.5)
NEUTROPHILS NFR BLD AUTO: 80.8 % (ref 42–75)
PATHC CAST-AUWI FLAG: 1.35 (ref 0–2.49)
PLATELET # BLD AUTO: 230 THOU/UL (ref 130–400)
POTASSIUM SERPL-SCNC: 3.8 MMOL/L (ref 3.5–5.1)
PROT UR STRIP.AUTO-MCNC: 30 MG/DL
RBC # BLD AUTO: 3.98 MILL/UL (ref 4.7–6.1)
RBC UR QL AUTO: (no result) HPF (ref 0–3)
SODIUM SERPL-SCNC: 135 MMOL/L (ref 136–145)
SP GR UR STRIP: 1.01 (ref 1–1.04)
SPERM-AUWI FLAG: 0 (ref 0–9.9)
WBC # BLD AUTO: 16.4 THOU/UL (ref 4.8–10.8)
YEAST FLD HPF-#/AREA: (no result) HPF
YEAST-AUWI FLAG: 2241.1 (ref 0–25)

## 2018-03-19 PROCEDURE — A4216 STERILE WATER/SALINE, 10 ML: HCPCS

## 2018-03-19 PROCEDURE — 80202 ASSAY OF VANCOMYCIN: CPT

## 2018-03-19 PROCEDURE — 74230 X-RAY XM SWLNG FUNCJ C+: CPT

## 2018-03-19 PROCEDURE — 85049 AUTOMATED PLATELET COUNT: CPT

## 2018-03-19 PROCEDURE — 81015 MICROSCOPIC EXAM OF URINE: CPT

## 2018-03-19 PROCEDURE — 87040 BLOOD CULTURE FOR BACTERIA: CPT

## 2018-03-19 PROCEDURE — 80053 COMPREHEN METABOLIC PANEL: CPT

## 2018-03-19 PROCEDURE — 94640 AIRWAY INHALATION TREATMENT: CPT

## 2018-03-19 PROCEDURE — 74176 CT ABD & PELVIS W/O CONTRAST: CPT

## 2018-03-19 PROCEDURE — 93005 ELECTROCARDIOGRAM TRACING: CPT

## 2018-03-19 PROCEDURE — 83605 ASSAY OF LACTIC ACID: CPT

## 2018-03-19 PROCEDURE — 71045 X-RAY EXAM CHEST 1 VIEW: CPT

## 2018-03-19 PROCEDURE — 96374 THER/PROPH/DIAG INJ IV PUSH: CPT

## 2018-03-19 PROCEDURE — 83735 ASSAY OF MAGNESIUM: CPT

## 2018-03-19 PROCEDURE — 36416 COLLJ CAPILLARY BLOOD SPEC: CPT

## 2018-03-19 PROCEDURE — 80048 BASIC METABOLIC PNL TOTAL CA: CPT

## 2018-03-19 PROCEDURE — 85025 COMPLETE CBC W/AUTO DIFF WBC: CPT

## 2018-03-19 PROCEDURE — 85014 HEMATOCRIT: CPT

## 2018-03-19 PROCEDURE — 96361 HYDRATE IV INFUSION ADD-ON: CPT

## 2018-03-19 PROCEDURE — 85018 HEMOGLOBIN: CPT

## 2018-03-19 PROCEDURE — 83690 ASSAY OF LIPASE: CPT

## 2018-03-19 PROCEDURE — 80069 RENAL FUNCTION PANEL: CPT

## 2018-03-19 PROCEDURE — 81003 URINALYSIS AUTO W/O SCOPE: CPT

## 2018-03-19 PROCEDURE — 70450 CT HEAD/BRAIN W/O DYE: CPT

## 2018-03-19 PROCEDURE — 87804 INFLUENZA ASSAY W/OPTIC: CPT

## 2018-03-19 PROCEDURE — 87086 URINE CULTURE/COLONY COUNT: CPT

## 2018-03-19 PROCEDURE — 96375 TX/PRO/DX INJ NEW DRUG ADDON: CPT

## 2018-03-19 PROCEDURE — 36415 COLL VENOUS BLD VENIPUNCTURE: CPT

## 2018-03-19 RX ADMIN — CEFEPIME HYDROCHLORIDE SCH MLS: 1 INJECTION, POWDER, FOR SOLUTION INTRAMUSCULAR; INTRAVENOUS at 21:21

## 2018-03-19 RX ADMIN — MOMETASONE FUROATE AND FORMOTEROL FUMARATE DIHYDRATE SCH: 200; 5 AEROSOL RESPIRATORY (INHALATION) at 18:23

## 2018-03-19 NOTE — CT
CT BRAIN NONCONTRAST:

 

HISTORY: 

77-year-old male with acute CVA: Right sided weakness. 

 

FINDINGS:

There is no midline shift or any other mass effect.  There is no evidence of acute intracranial hemor
rhage, large cortical infarct, obstructive hydrocephalus, or extraaxial fluid collection.  The calvar
ium is intact.

 

IMPRESSION: 

No acute intracranial findings.

 

 

sulaiman 

 

POS: [facility]

## 2018-03-19 NOTE — RAD
SINGLE VIEW OF THE CHEST:

 

COMPARISON: 

1/31/18.

 

HISTORY: 

Altered mental status since Saturday.

 

FINDINGS: 

A single view of the chest shows a normal-size cardiomediastinal silhouette.  A pacemaker is seen wit
h its leads in the right atrium and ventricle.  Increased interstitial lung markings are present.  Th
ere appears to be a small right pleural effusion.  

 

IMPRESSION: 

Right pleural effusion.

 

POS: [facility]

## 2018-03-19 NOTE — HP
REASON FOR ADMISSION:  Sepsis, possible UTI with indwelling Hylton, possible CVA.

 

HISTORY OF PRESENTING ILLNESS:  Please note majority of this history is 
obtained by my talking to patient's wife and prior records as patient is not a 
very good historian.  He lives at [facility] and was transferred 
here.  Patient apparently was sitting in a wheelchair and slumped over.  His 
speech was slurred.  He was unable to eat and a decision was made to transfer 
him then.  The patient is right-handed person.  Currently, he has limp right 
upper extremity, but patient is able to move both lower extremities better.  He 
does not appear to be in any distress at present.

 

PAST MEDICAL AND SURGICAL HISTORY:  History of atrial fibrillation, COPD, 
pacemaker, prior history of coronary artery disease, aortic stenosis.  Cardiac 
catheterization done in 2018 showed 90% stenosis of left main, previous 
stent to the proximal LAD was patent.  Left circumflex had multiple stenoses 
and was irregular and a small caliber vessel, proximal circumflex had 90% 
stenosis, first obtuse marginal midsection at 90% stenosis.  Mid stent was 
patent.  The patient subsequently had 2 stents put in February at [facility].  Upper endoscopy done on  showed multiple gastric ulcers, two 
in the stomach, including fundus gastric body, incisura angularis, peripheral 
vascular disease, dyslipidemia.  The patient had severe left main coronary 
artery disease and had stenting done at [facility] for the same, 
chronic anemia, severe deconditioning, diastolic heart failure, prior right 
common iliac stent, bilateral carotid occlusion status post right carotid 
endarterectomy, [employer] pacemaker, carpal tunnel release surgery on the left
, cataract extraction with intraocular lens.

 

CURRENT MEDICATIONS:  Per [facility] records, patient is on Norco p.r.n.  He 
was started on Levaquin on  for suspected pneumonia, Protonix 40 mg p.o. 
daily, Xarelto 20 mg daily, ferrous sulfate 325 mg daily, Flomax 0.4 mg 
extended release daily, Lasix 40 mg daily, Plavix 75 mg daily, metoprolol 50 mg 
twice daily, potassium chloride 20 mEq p.o. daily, gabapentin 300 mg 2 capsules 
3 times a day, atorvastatin 80 mg p.o. at bedtime, DuoNebs p.r.n.  He is on 2 
liters nasal cannula oxygen and has had Hylton catheter from 2018, 
Symbicort inhaler 160/4.5 mcg 2 puffs twice daily.

 

ALLERGIES:  No known drug allergies.

 

PERSONAL HISTORY:  Quit smoking in 2018.  Smoke 2-3 packs a day for nearly 30
-40 years.  Does not abuse alcohol or drugs.

 

FAMILY HISTORY:  Mother  in her 60s from massive MI.  Father  in his 
80s due to old age.

 

REVIEW OF SYSTEMS:  Cannot be obtained as patient is not oriented.

 

PHYSICAL EXAMINATION:

GENERAL:  The patient is a 77-year-old male who is currently not in any acute 
distress.

VITAL SIGNS:  Blood pressure 160/88, pulse 110 per minute, respiratory rate 22 
per minute, temperature 97.4 degrees Fahrenheit, saturating 100% on 3 liters 
nasal cannula.

NECK:  Supple, no elevated JVD.

EYES:  Extraocular muscles intact.  Pupils reacting to light.

ORAL CAVITY:  Mucous membranes are dry.  No exudates or congestion.

CARDIOVASCULAR SYSTEM:  S1, S2 heard.  Regular rhythm.

RESPIRATORY SYSTEM:  Air entry 1+ bilaterally.  No rales or rhonchi.

ABDOMEN:  Soft, bowel sounds heard.  No tenderness, rigidity or guarding.

EXTREMITIES:  No peripheral edema or calf tenderness.  The patient has multiple 
skin excoriations seen as extremely dry skin with flaking.  He also has what 
appears to be a small ulcer at the right metacarpophalangeal webspace with 
purulent discharge.  Has an ulcer on the right heel and left ankle.

CENTRAL NERVOUS SYSTEM:  Patient is physically deconditioned and is also 
holding his right upper extremity limp with weakness.  He is able to move with 
gravity in the same. He moves the other three extremities better, but more so 
weak on the right upper.  Accurate neurologic exam is not possible due to 
patient's cognitive deficits at present.

PSYCHIATRIC SYSTEM:  The patient does not have any obvious hallucinations or 
delusions.

 

IMAGING DATA AND LABORATORY DATA:  Chest x-ray done shows right pleural 
effusion.  Influenza A and B antigens are negative.  Urinalysis shows positive 
nitrite, large leukocyte esterase, greater than 50 wbc's, but no bacteria seen.
  There is 4+ yeast.  BUN is 17, creatinine 0.8, and glucose 60.  Lactic acid 
is 2.5.  Liver enzymes are within normal limits.  Albumin is 2.9.  Lipase is 
15.  White count of 16, hemoglobin and hematocrit 11 and 36, platelet count 230 
with 80% neutrophils, MCV is 90.  EKG done shows atrial fibrillation at 112 
beats per minute.

 

CLINICAL IMPRESSION AND PLAN:  The patient will be admitted to telemetry for 
possible sepsis with indwelling Hylton catheter, likely fungal.  We will place 
him on fluconazole and empirically cover him with cefepime, Levaquin, and 
vancomycin until cultures are back.  Patient has physical deconditioning which 
has been progressively getting worse per wife.  She does not want him to be 
resuscitated and wants him to be DNR.  He has had left main and circumflex 
stent placed recently and is on Plavix and Xarelto along with Multaq.  We will 
continue Plavix and Multaq for now.  We will continue his Lipitor as well.  He 
will be gently hydrated with normal saline at 100 mL per hour.  Wound Care 
consultation will be requested as well.  We will obtain a CT brain to rule out 
cerebrovascular accident.  The patient has an AICD and cannot obtain an MRI.  
His overall prognosis is guarded with multiple medical issues at present.  We 
will also consult Dr. Garcia for Infectious Disease.  We will also obtain speech 
evaluation to rule out aspiration.

 

[facility]

## 2018-03-20 LAB
ALBUMIN SERPL BCG-MCNC: 2.2 G/DL (ref 3.4–4.8)
ALP SERPL-CCNC: 81 U/L (ref 40–150)
ALT SERPL W P-5'-P-CCNC: 8 U/L (ref 8–55)
ANION GAP SERPL CALC-SCNC: 14 MMOL/L (ref 10–20)
AST SERPL-CCNC: 18 U/L (ref 5–34)
BASOPHILS # BLD AUTO: 0 THOU/UL (ref 0–0.2)
BASOPHILS NFR BLD AUTO: 0.2 % (ref 0–1)
BILIRUB SERPL-MCNC: 0.5 MG/DL (ref 0.2–1.2)
BUN SERPL-MCNC: 16 MG/DL (ref 8.4–25.7)
CALCIUM SERPL-MCNC: 8 MG/DL (ref 7.8–10.44)
CHLORIDE SERPL-SCNC: 105 MMOL/L (ref 98–107)
CO2 SERPL-SCNC: 20 MMOL/L (ref 23–31)
CREAT CL PREDICTED SERPL C-G-VRATE: 74 ML/MIN (ref 70–130)
EOSINOPHIL # BLD AUTO: 0 THOU/UL (ref 0–0.7)
EOSINOPHIL NFR BLD AUTO: 0.3 % (ref 0–10)
GLOBULIN SER CALC-MCNC: 2.6 G/DL (ref 2.4–3.5)
GLUCOSE SERPL-MCNC: 51 MG/DL (ref 83–110)
HGB BLD-MCNC: 9 G/DL (ref 14–18)
LYMPHOCYTES # BLD: 1.9 THOU/UL (ref 1.2–3.4)
LYMPHOCYTES NFR BLD AUTO: 15.4 % (ref 21–51)
MCH RBC QN AUTO: 28.2 PG (ref 27–31)
MCV RBC AUTO: 91.9 FL (ref 80–94)
MONOCYTES # BLD AUTO: 0.7 THOU/UL (ref 0.11–0.59)
MONOCYTES NFR BLD AUTO: 5.4 % (ref 0–10)
NEUTROPHILS # BLD AUTO: 9.6 THOU/UL (ref 1.4–6.5)
NEUTROPHILS NFR BLD AUTO: 78.7 % (ref 42–75)
PLATELET # BLD AUTO: 199 THOU/UL (ref 130–400)
POTASSIUM SERPL-SCNC: 3.4 MMOL/L (ref 3.5–5.1)
RBC # BLD AUTO: 3.18 MILL/UL (ref 4.7–6.1)
SODIUM SERPL-SCNC: 136 MMOL/L (ref 136–145)
WBC # BLD AUTO: 12.2 THOU/UL (ref 4.8–10.8)

## 2018-03-20 RX ADMIN — CEFEPIME HYDROCHLORIDE SCH MLS: 1 INJECTION, POWDER, FOR SOLUTION INTRAMUSCULAR; INTRAVENOUS at 10:17

## 2018-03-20 RX ADMIN — VANCOMYCIN HYDROCHLORIDE SCH MLS: 1 INJECTION, SOLUTION INTRAVENOUS at 16:14

## 2018-03-20 RX ADMIN — MOMETASONE FUROATE AND FORMOTEROL FUMARATE DIHYDRATE SCH PUFF: 200; 5 AEROSOL RESPIRATORY (INHALATION) at 18:20

## 2018-03-20 RX ADMIN — MOMETASONE FUROATE AND FORMOTEROL FUMARATE DIHYDRATE SCH PUFF: 200; 5 AEROSOL RESPIRATORY (INHALATION) at 05:53

## 2018-03-20 RX ADMIN — Medication SCH ML: at 21:45

## 2018-03-20 RX ADMIN — Medication SCH: at 10:17

## 2018-03-20 RX ADMIN — VANCOMYCIN HYDROCHLORIDE SCH MLS: 1 INJECTION, SOLUTION INTRAVENOUS at 05:30

## 2018-03-20 RX ADMIN — CEFEPIME HYDROCHLORIDE SCH MLS: 1 INJECTION, POWDER, FOR SOLUTION INTRAMUSCULAR; INTRAVENOUS at 21:46

## 2018-03-20 NOTE — CT
NONCONTRAST CT ABDOMEN AND PELVIS:

 

03/20/2018

 

HISTORY:

Neutrophilia.  Abnormal urinalysis.  Tailbone pain.  Sepsis and UTI.

 

COMPARISON:

Noncontrast CT thorax on 01/16/2018.

 

FINDINGS:

There is a small pericardial effusion, which has increased from prior exam.  Again noted are moderate
 bilateral pleural effusions with consolidation at each lung base.  Areas of consolidation may be rel
ated to passive atelectasis, but bibasilar pneumonia is also a differential consideration.

 

There is partial visualization of cardiac pacemaker leads.  There are calcifications of the mitral va
lve annulus partially imaged with dense vascular calcifications seen in the abdominal aorta and invol
ving the iliac arteries.  A vascular stent is present in the right common iliac artery.

 

There are remote bilateral rib fractures.  Degenerative changes are seen in the spine with S-shaped s
coliotic curvature.  There is a compression type fracture involving the T11 vertebral body, stable fr
om prior exam.

 

There is an exophytic increased density lesion involving the posterior aspect, mid portion, right kid
luly, measuring 2.3 cm.  This cannot be characterized as a simple cyst based on this exam.  There is a
 subcentimeter, exophytic, slightly increased density lesion at the posterior aspect junction, mid po
rtion, inferior pole, left kidney.

 

There is a nonobstructing 3 mm calculus, mid portion, left kidney.  No ureteral calculus is visualize
d.

 

A subcentimeter low density lesion is seen in the peripheral aspect of the body of the spleen, which 
was not definitely visualized on the prior exam.

 

The gallbladder is mildly distended, measurin 10 cm in craniocaudal dimensions.

 

The liver, pancreas, and bilateral adrenal glands demonstrate a grossly normal nonenhanced CT appeara
nce.

 

A Hylton catheter is present in a decompressed urinary bladder.

 

There is colonic diverticulosis.

 

Prominent degenerative changes are seen on the left at the lumbosacral junction.

 

There is a mass-like, incompletely imaged density seen along the lateral aspect of the proximal right
 femoral diaphysis, which extends from the level of the lesser trochanter inferiorly.  The most infer
ior extent of this mass-like structure is not imaged.  The greatest AP and transverse dimensions on t
he visualized portions of this mass-like structure are 6.9 cm AP x 5.1 cm transverse.  The adjacent c
ortex of the proximal femur does appear intact, without cortical irregularity.  This could represent 
a mass, such as sarcoma, in this region.  While hematoma is not entirely excluded, there are no adjac
ent areas of stranding to suggest that this is related to hematoma.  Further evaluation with MRI is r
ecommended.

 

IMPRESSION:

1.  Incomplete imaging of a mass-like structure along the lateral, anterolateral, and posterolateral 
aspect of the proximal right femoral diaphysis. Findings could be related to a neoplastic process.  H
ematoma is a differential consideration but thought less likely.  Further evaluation with MRI is sergio
mmended.  This should be performed with and without IV contrast, depending on the patient's renal fun
ction.

 

2.  Increased density lesion, posterior aspect, mid portion, right kidney, which cannot be characteri
zed as a cyst.  This may represent a hyperdense cyst, but further characterization with pre and post 
contrast imaging is recommended.  In addition, there is a subcentimeter, too-small-to-characterize, e
xophytic lesion, inferior pole, left kidney.

 

3.  Moderate size bilateral pleural effusions with bibasilar consolidation also seen on prior CT thor
ax.  Findings may be related to passive atelectasis, although bibasilar pneumonia should be considere
d.

 

4.  Small right pericardial effusion.  

 

5.  Nonobstructing left renal calculus.

 

6.  Dense vascular calcifications.

 

7.  Mild distention of the gallbladder.

 

8.  Colonic diverticulosis.

 

9.  Nonspecific presacral inflammatory stranding.  The adjacent colonic wall does not appear to be th
ickened.

 

10.  Stable compression fracture of the T11 vertebral body.  Multilevel degenerative changes are seen
 in the spine with S-shaped scoliotic curvature of the thoracolumbar spine.

 

CODE T

 

POS: MARAH

## 2018-03-20 NOTE — CON
DATE OF CONSULTATION:  03/20/2018

 

REASON FOR CONSULTATION:  Change in mental status, abnormal urinalysis.

 

HISTORY OF PRESENT ILLNESS:  A 77-year-old patient who is a resident of 
[facility] with a history of COPD, coronary artery disease, 
pacemaker, and prior carotid endarterectomy and significant cognitive 
dysfunction who was found slumping on the chair at the [facility].  
According to the nurse, there had been no recorded fever prior to this event.  
He had displayed no evidence of aspiration.  The patient has a chronic 
indwelling Hylton catheter, unclear the indication for that.  There had been no 
reported diarrhea or constipation.  After admission, his BP was identified at 
160/88, pulse 110, respirations 22, temperature 97.4, O2 sat 100%.  He had no 
evidence of jugular vein distention.  Heart exam was normal.  Lungs with 1+ 
bilateral air entry, no crackles or wheezing.  Abdomen was soft, without 
tenderness.  The patient has what they described as "excoriation" in the 
extremity.  The right upper extremity was felt to be weak.  The other three 
extremities had better movement.

 

Initial data, white cell count 16,000, hemoglobin 11, platelets 230, with 80% 
neutrophils.  Sodium 135, creatinine 0.81.  Liver profile is normal.  Albumin 
2.9, globulin 3.2.  Lactic acid was 2.5 and now is down to 0.9.  Urinalysis 
with greater than 50 wbc's and microbiology results, pending blood cultures.  
There is a urine culture from 03/19/2018 with still pending results.  Influenza 
A and B were negative.  The patient had a chest x-ray which showed a right 
pleural effusion, pacemaker, increased interstitial lung markings.  Brain CT 
scan with no acute intracranial findings.  The patient has been given cefepime, 
levofloxacin, vancomycin and fluconazole.

 

Currently, Mr. Montelongo is awake.  He does not appear in distress.  He was 
disoriented.  Denied any headaches, no sore throat.  No back pain, no chest pain
, no abdominal pain.  He was concerned about the lesions in the skin of his 
right hand and forearm.  The accuracy or reliability of his subjective account 
is limited because of cognitive issues.

 

ALLERGIES:  NONE.

 

PAST MEDICAL HISTORY:  Atrial fibrillation, coronary artery disease, prior 
stenting, COPD, pacemaker, peripheral vascular disease, endarterectomies, 
diastolic heart failure, carpal tunnel release, cataract extraction.

 

MEDICATIONS:  Meds at the [facility] home, had been given levofloxacin in the past
, Protonix, Xarelto daily, Flomax extended release, Lasix, Plavix, metoprolol, 
potassium, gabapentin, atorvastatin, DuoNeb, Hylton catheter, Symbicort inhaler.

 

PERSONAL HISTORY:  Former smoker, quit smoking in 01/2018.

 

FAMILY HISTORY:  Coronary artery disease.

 

PHYSICAL EXAMINATION:

VITAL SIGNS:  Temperature max 98.6, blood pressure 104/51, pulse 78, 
respirations 16, O2 sat 93-98%.

SKIN:  Shows a scabbed over a small ulcerations, dermatomal distribution of the 
C5-C6 right hand starting at the forearm and going towards the hand and radial 
aspect, there is a little bit larger irregular ulceration with yellow base in 
the first, second finger web space.  No lymphadenopathy.  Peripheral IV access.

HEENT:  Ocular movements are conjugate.  Oral cavity with numerous missing teeth
, the remainder ones with quite a bit of decay and gum disease.  No oral 
mucosal lesion.

NECK:  Supple.  No jugular vein distention.

LUNGS:  With symmetric breath sounds, little bit of wheezing in the mid lung 
fields.  The right side has a few crackles.

ABDOMEN:  Soft, not distended or tender.  No ascites.  No bladder distention.

:  Hylton catheter in place.  Genital evaluation normal.

EXTREMITIES:  Pulses 1+ in dorsalis pedis.

NEUROLOGIC:  Plantar responses are indifferent and diffusely weak.  I could not 
find any focal weakness.  He is awake, follows commands.

 

Labs have been discussed above.  White cell count down to 12,000, hemoglobin 9, 
platelets 199 with 78% neutrophils.

 

ASSESSMENT:

1.  Chronic obstructive pulmonary disease with coronary artery disease with 
pacemaker.

2.  Peripheral vascular disease with prior endarterectomies.

3.  Chronic smoking.

4.  Indwelling Hylton catheter unknown indication with abnormal urinalysis.

5.  Change in mental status, which seems to have resolved.

6.  Elevated lactate and neutrophilia.

 

DISCUSSION:  Differential diagnosis includes an invasive urinary tract 
infection associated with Hylton catheterization versus aspiration pneumonia 
versus thromboembolism.  Patient has Xarelto listed as a part of his medication 
profile in the nursing home, which would decrease the risk of pulmonary 
embolism or thromboembolism, if he truly is taking it daily, he does have 
abnormal lung examination and in part may be due to cardiac dysfunction with 
interstitial edema.  Aspiration pneumonia is not ruled out.  It is important to 
image the abdominal area and pelvis to evaluate for possible obstructive 
findings.  We will order a CT stone protocol for that.

 

[facility]

## 2018-03-21 LAB — VANCOMYCIN TROUGH SERPL-MCNC: 23.8 UG/ML

## 2018-03-21 RX ADMIN — Medication SCH ML: at 10:14

## 2018-03-21 RX ADMIN — MOMETASONE FUROATE AND FORMOTEROL FUMARATE DIHYDRATE SCH PUFF: 200; 5 AEROSOL RESPIRATORY (INHALATION) at 19:30

## 2018-03-21 RX ADMIN — MOMETASONE FUROATE AND FORMOTEROL FUMARATE DIHYDRATE SCH PUFF: 200; 5 AEROSOL RESPIRATORY (INHALATION) at 06:53

## 2018-03-21 RX ADMIN — Medication SCH: at 20:57

## 2018-03-21 RX ADMIN — CEFEPIME HYDROCHLORIDE SCH MLS: 1 INJECTION, POWDER, FOR SOLUTION INTRAMUSCULAR; INTRAVENOUS at 10:08

## 2018-03-21 RX ADMIN — CEFEPIME HYDROCHLORIDE SCH MLS: 1 INJECTION, POWDER, FOR SOLUTION INTRAMUSCULAR; INTRAVENOUS at 21:02

## 2018-03-21 NOTE — PQF
CLINICAL DOCUMENTATION IMPROVEMENT CLARIFICATION FORM:  ICD-10 Updated

PLEASE DO AN ADDENDUM TO THE PROGRESS NOTE WITH ANY DOCUMENTATION UPDATES OR 
ADDITIONS AND CARRY THROUGH TO DC SUMMARY.   THANK YOU.



DATE:  3/21/18                                  ATTN:  Dr. Montelongo



Please exercise your independent, professional judgment in responding to the 
clarification form. 

Clinical indicators are provided on the bottom of this form for your review



Please check appropriate box(es):

[  x] Sepsis due to  UTI, due to indwelling omer catheter _____________________
_________________ 

[  ] Sepsis due to Pneumonia. 

[  ] Sepsis due to other:___________________

[  ] Localized infection without sepsis

[  ] Other diagnosis ___________

[  ] Unable to determine



In addition, please specify:

Present on Admission (POA):  [x  ] Yes             [  ] No             [  ] 
Unable to determine



For continuity of documentation, please document condition throughout progress 
notes and discharge summary.  Thank You.



CLINICAL INDICATORS - SIGNS / SYMPTOMS / LABS



H&P: /88, PULSE 110, RESP 22

        LACTIC ACID 2.5    WHITE COUNT OF 16

        ADMITTED TO TELEMETRY FOR POSSIBLE SEPSIS W/  INDWELLING OMER CATHETER

        LIKELY FUNGAL.



ID CONSULT: DIFFERENTIAL DIAGNOSIS INCLUDES AN INVASIVE UTI ASSOC. WITH OMER 

                     CATHETERIZATION VS ASPIRATION PNEUMONIA VS 
THROMBOEMBOLISM. 



PN 3/20: SEPSIS

              CATHETER - ASSOCIATED UTI

              PNEUMONIA . RESOLVED.



RISKS:

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 HAS HAD OMER 

         CATHETER FROM 2/14/2018.



TREATMENTS:

     CPOE 3/19: LEVAQUIN 500MG IV Q 24HR

     CPOE 3/19: CEFEPIME 1 GM SLOW IV Q 12 HR

   ORDER 3/19: IV VANCOMYCIN PRN, 







Thank you,

Tanvi

(This form is maintained as a part of the permanent medical record)

 2015 [employer].  All Rights Reserved

Tanvi Dee RN, BSN    [email or web address]    Office: [phone]

                                                              

 





[facility]

## 2018-03-21 NOTE — PRG
DATE OF SERVICE:  03/21/2018

 

SUBJECTIVE:  More alert.  Still having a little bit trouble hearing.  Family in the room today.  Dung
es any headaches, no respiratory symptoms, no abdominal pain.  Voiding without difficulty.

 

PHYSICAL EXAMINATION:

VITAL SIGNS:  His T-max 97.7, blood pressure 101/58, pulse 69, respirations 16, O2 sat 93% room air.

GENERAL:  Appears in no distress, chronically ill appearing.

SKIN:  Skin lesions are noted before.

LUNGS:  Symmetric air entry.

HEART:  S1, S2, regular rate.

GASTROINTESTINAL:  No abdominal findings.

MUSCULOSKELETAL:  Right thigh with no areas of swelling or ecchymosis noted in the hip either.

 

LABORATORY DATA AND IMAGING DATA:  White cell count 12.2, hemoglobin 9, platelets 199.  Chemistry:  S
odium 136, creatinine 0.74.  Liver profile normal.  Albumin 2.2.  We have urine culture with yeast sp
ecies, greater than 100,000 CFUs.  Two sets of blood culture showed no growth at 48 hours.   Abdomen 
and pelvis CT demonstrated small pericardial effusion, bilateral pleural effusions with consolidation
 in lung base and dense vascular calcifications seen in the abdominal aorta, remote rib fractures, co
mpression fracture of T11, which is stable.  There is increased density lesion in posterior aspect ri
ght kidney 2.3 cm, nonobstructing calculus, mid portion of left kidney 3 mm, gallbladder is mildly di
stended.  Liver, pancreas, and adrenal glands normal.  There was a mass-like incompletely imaged dens
ity seen along the lateral aspect of the proximal right femoral diaphysis, which extends from the lev
el of the lesser trochanter inferiorly.  The greatest AP and transverse dimensions were 6.9 x 5.1 cm.
  No cortical irregularities noted.  Further evaluation with MRI was recommended.

 

ASSESSMENT AND DISCUSSION:  Chronic obstructive pulmonary disease, coronary artery disease with pacem
dennis, peripheral vascular disease, chronic smoking, indwelling Hylton catheter, elevated lactate neutr
ophilia.  The previous differential diagnosis had been discussed with possible invasive urinary tract
 infection versus aspiration pneumonia, but now the patient has this mass which was serendipitously i
dentified in the right femur.  We will go ahead and image that area for identification purposes.

## 2018-03-21 NOTE — PDOC.PN
- Subjective


Encounter Start Date: 03/21/18


Encounter Start Time: 11:00





PAtient is seen today, very drowsy and disoriented. Unable to Talk at this 

timke. Discussed with nurse pt has bveen drowsy since this morning too.





- Objective


Resuscitation Status: 


 











Resuscitation Status           DNR:Do Not Resuscitate














MAR Reviewed: Yes


Vital Signs & Weight: 


 Vital Signs (12 hours)











  Temp Pulse Resp BP BP Pulse Ox


 


 03/21/18 10:09   72   101/58 L  


 


 03/21/18 10:08   58 L  14   


 


 03/21/18 08:00  97.6 F  72  18   93/56 L  93 L


 


 03/21/18 06:55   72  14   


 


 03/21/18 04:32  97.6 F  83  20   92/52 L  94 L


 


 03/21/18 00:37  97.7 F  75  20    94 L


 


 03/21/18 00:00  97.7 F  75  20   91/51 L  94 L








 Weight











Admit Weight                   137 lb 6 oz


 


Weight                         137 lb 6 oz














I&O: 


 











 03/20/18 03/21/18 03/22/18





 06:59 06:59 06:59


 


Intake Total 1525 2667 


 


Output Total 400 450 


 


Balance 1125 2217 











Result Diagrams: 


 03/20/18 05:08





 03/20/18 05:08


Additional Labs: 


 Accuchecks











  03/21/18 03/20/18 03/20/18





  04:40 20:51 16:15


 


POC Glucose  77  97  85














  03/20/18





  13:35


 


POC Glucose  129 H











Radiology Reviewed by me: Yes


EKG Reviewed by me: Yes





Phys Exam





- Physical Examination


HEENT: PERRLA, moist MMs


Neck: no nodes


Respiratory: no wheezing, no rales


Cardiovascular: RRR, no significant murmur


Gastrointestinal: soft, non-tender


Musculoskeletal: no edema, pulses present


Neurological: non-focal, normal sensation


Psychiatric: normal affect, A&O x 3





Dx/Plan


(1) Sepsis


Code(s): A41.9 - SEPSIS, UNSPECIFIED ORGANISM   Status: Acute   Comment: 

Patient is on IV antibiotics, Responded, with improving WBC, ID consulted.    





(2) UTI (urinary tract infection)


Status: Acute   


Qualifiers: 


   Urinary tract infection type: catheter-associated UTI 


Comment: Contiue with IV Abx, improving cultures growing yeast, Will d/c 

vancomycin..   





(3) COPD (chronic obstructive pulmonary disease)


Status: Chronic   


Qualifiers: 


 


Comment: Stable O2 requirements. Not in exacerbartion. Continue RT, nebs


   





(4) Coronary artery disease


Code(s): I25.10 - ATHSCL HEART DISEASE OF NATIVE CORONARY ARTERY W/O ANG PCTRS 

  Status: Chronic   


Qualifiers: 


 


Comment: CATH showed inoperable lesion (~90% blockage L main, calcified)


Very poor prognosis as patient's clinical condition precluded CABG. Apparently [facility] in [place] willing to accept pt in transfer for operative mgmt.





   





(5) PNA (pneumonia)


Code(s): J18.9 - PNEUMONIA, UNSPECIFIED ORGANISM   Status: Resolved   


Qualifiers: 


 


Comment: Chest xray neg, will look for Pneumonia if persistant SOB,, Swallow 

study today.    





(6) Yeast UTI


Code(s): B37.49 - OTHER UROGENITAL CANDIDIASIS   Status: Acute   Comment: Will 

start pt on Diflucan 200mg IV now and 100mg IV daily.   





- Plan


cont current plan of care, continue antibiotics, PT/OT, , 

respiratory therapy, incentive spirometry, DVT proph w/lovenox





* .








- Discharge Day


Encounter end time: 11:35





Review of Systems





- Review of Systems


Eyes: negative: Pain, Vision Change, Conjunctivae Inflammation, Eyelid 

Inflammation, Redness, Other


ENT: negative: Ear Pain, Ear Discharge, Nose Pain, Nose Discharge, Nose 

Congestion, Mouth Pain, Mouth Swelling, Throat Pain, Throat Swelling, Other


Respiratory: negative: Cough, Dry, Shortness of Breath, Hemoptysis, SOB with 

Excertion, Pleuritic Pain, Sputum, Wheezing


Cardiovascular: negative: chest pain, palpitations, orthopnea, paroxysmal 

nocturnal dyspnea, edema, light headedness, other


Gastrointestinal: negative: Nausea, Vomiting, Abdominal Pain, Diarrhea, 

Constipation, Melena, Hematochezia, Other


Genitourinary: negative: Dysuria, Frequency, Incontinence, Hematuria, Retention

, Other


Musculoskeletal: negative: Neck Pain, Shoulder Pain, Arm Pain, Back Pain, Hand 

Pain, Leg Pain, Foot Pain, Other





- Medications/Allergies


Allergies/Adverse Reactions: 


 Allergies











Allergy/AdvReac Type Severity Reaction Status Date / Time


 


No Known Drug Allergies Allergy   Verified 03/19/18 16:26











Medications: 


 Current Medications





Acetaminophen (Tylenol)  650 mg PO Q4H PRN


   PRN Reason: Headache/Fever or Pain


Albuterol/Ipratropium (Duoneb)  3 ml NEB QID-RT [facility]


   Last Admin: 03/21/18 10:08 Dose:  3 ml


Atorvastatin Calcium (Lipitor)  80 mg PO DAILY [facility]


   Last Admin: 03/21/18 10:12 Dose:  80 mg


Clopidogrel Bisulfate (Plavix)  75 mg PO DAILY [facility]


   Last Admin: 03/21/18 10:14 Dose:  75 mg


Dextrose/Water (Dextrose 50%)  25 gm SLOW IVP PRN PRN


   PRN Reason: Hypoglycemia


Diltiazem HCl (Cardizem Cd)  240 mg PO DAILY [facility]


   Last Admin: 03/21/18 10:09 Dose:  240 mg


Docusate Sodium (Colace)  100 mg PO BID [facility]


   Last Admin: 03/21/18 10:13 Dose:  100 mg


Dronedarone (Multaq)  400 mg PO BID [facility]


   Last Admin: 03/21/18 10:12 Dose:  400 mg


Enoxaparin Sodium (Lovenox)  40 mg SC 0900 [facility]


   Last Admin: 03/21/18 10:13 Dose:  40 mg


Famotidine (Pepcid)  20 mg PO BID [facility]


   Last Admin: 03/21/18 10:12 Dose:  20 mg


Ferrous Sulfate (Feosol)  325 mg PO DAILY [facility]


   Last Admin: 03/21/18 10:13 Dose:  325 mg


Gabapentin (Neurontin)  300 mg PO TID [facility]


   Last Admin: 03/21/18 10:13 Dose:  300 mg


Glucagon (Glucagon)  1 mg IM PRN PRN


   PRN Reason: Hypoglycemia


Levofloxacin 500 mg/ Device  100 mls @ 100 mls/hr IVPB Q24HR [facility]


   Last Admin: 03/20/18 16:13 Dose:  100 mls


Sodium Chloride (Normal Saline 0.9%)  1,000 mls @ 100 mls/hr IV .Q10H [facility]


   Last Admin: 03/21/18 00:21 Dose:  1,000 mls


Cefepime HCl 1 gm/Miscellaneous Medication 1 each/ Sodium Chloride  10 mls @ 

120 mls/hr SLOW IVP Q12HR [facility]


   Last Admin: 03/21/18 10:08 Dose:  10 mls


Dextrose/Water (D5w)  1,000 mls @ 0 mls/hr IV .Q0M PRN; As Directed


   PRN Reason: Hypoglycemia


Vancomycin HCl 750 mg/ Sodium (Chloride)  250 mls @ 200 mls/hr IVPB 0300,1500 

[facility]


   Last Admin: 03/21/18 03:07 Dose:  250 mls


Fluconazole/Sodium Chloride (100 mg/ Device)  50 mls @ 100 mls/hr IVPB DAILY [facility]


Fluconazole/Sodium Chloride (200 mg/ Device)  100 mls @ 100 mls/hr IVPB NOW [facility]


   Stop: 03/21/18 14:00


Metoprolol Tartrate (Lopressor)  50 mg PO BID [facility]


   Last Admin: 03/21/18 10:12 Dose:  50 mg


Miscellaneous Medication (Pharmacy To Dose)  1 each IVPB PRN PRN


   PRN Reason: .


Mometasone Furoate/Formoterol Fumar (Dulera 200 Mcg/5 Mcg Inhaler)  2 puff INH 

BID-RT [facility]


   Last Admin: 03/21/18 06:53 Dose:  2 puff


Rivaroxaban (Xarelto)  15 mg PO DAILY [facility]


   Last Admin: 03/21/18 10:08 Dose:  15 mg


Senna (Senokot)  2 tab PO HSPRN PRN


   PRN Reason: Constipation


Sodium Chloride (Flush - Normal Saline)  10 ml IVF Q12HR [facility]


   Last Admin: 03/21/18 10:14 Dose:  10 ml


Sodium Chloride (Flush - Normal Saline)  10 ml IVF PRN PRN


   PRN Reason: Saline Flush


Tamsulosin HCl (Flomax)  0.4 mg PO DAILY [facility]


   Last Admin: 03/21/18 10:13 Dose:  0.4 mg

## 2018-03-22 LAB
ANION GAP SERPL CALC-SCNC: 12 MMOL/L (ref 10–20)
BASOPHILS # BLD AUTO: 0 THOU/UL (ref 0–0.2)
BASOPHILS NFR BLD AUTO: 0.2 % (ref 0–1)
BUN SERPL-MCNC: 13 MG/DL (ref 8.4–25.7)
CALCIUM SERPL-MCNC: 7.9 MG/DL (ref 7.8–10.44)
CHLORIDE SERPL-SCNC: 109 MMOL/L (ref 98–107)
CO2 SERPL-SCNC: 20 MMOL/L (ref 23–31)
CREAT CL PREDICTED SERPL C-G-VRATE: 79 ML/MIN (ref 70–130)
EOSINOPHIL # BLD AUTO: 0.1 THOU/UL (ref 0–0.7)
EOSINOPHIL NFR BLD AUTO: 1.3 % (ref 0–10)
GLUCOSE SERPL-MCNC: 72 MG/DL (ref 83–110)
HGB BLD-MCNC: 8.8 G/DL (ref 14–18)
LYMPHOCYTES # BLD: 1.6 THOU/UL (ref 1.2–3.4)
LYMPHOCYTES NFR BLD AUTO: 19 % (ref 21–51)
MCH RBC QN AUTO: 29.1 PG (ref 27–31)
MCV RBC AUTO: 93.9 FL (ref 80–94)
MONOCYTES # BLD AUTO: 0.5 THOU/UL (ref 0.11–0.59)
MONOCYTES NFR BLD AUTO: 6.3 % (ref 0–10)
NEUTROPHILS # BLD AUTO: 6.1 THOU/UL (ref 1.4–6.5)
NEUTROPHILS NFR BLD AUTO: 73.2 % (ref 42–75)
PLATELET # BLD AUTO: 184 THOU/UL (ref 130–400)
POTASSIUM SERPL-SCNC: 2.5 MMOL/L (ref 3.5–5.1)
RBC # BLD AUTO: 3.04 MILL/UL (ref 4.7–6.1)
SODIUM SERPL-SCNC: 138 MMOL/L (ref 136–145)
WBC # BLD AUTO: 8.4 THOU/UL (ref 4.8–10.8)

## 2018-03-22 RX ADMIN — Medication SCH: at 21:56

## 2018-03-22 RX ADMIN — MOMETASONE FUROATE AND FORMOTEROL FUMARATE DIHYDRATE SCH: 200; 5 AEROSOL RESPIRATORY (INHALATION) at 19:29

## 2018-03-22 RX ADMIN — CEFEPIME HYDROCHLORIDE SCH MLS: 1 INJECTION, POWDER, FOR SOLUTION INTRAMUSCULAR; INTRAVENOUS at 21:55

## 2018-03-22 RX ADMIN — CEFEPIME HYDROCHLORIDE SCH MLS: 1 INJECTION, POWDER, FOR SOLUTION INTRAMUSCULAR; INTRAVENOUS at 09:59

## 2018-03-22 RX ADMIN — Medication SCH ML: at 10:04

## 2018-03-22 RX ADMIN — MOMETASONE FUROATE AND FORMOTEROL FUMARATE DIHYDRATE SCH PUFF: 200; 5 AEROSOL RESPIRATORY (INHALATION) at 06:43

## 2018-03-22 NOTE — PRG
DATE OF SERVICE:  03/22/2018

 

SUBJECTIVE:  Delirious, having visions, chronically ill appearing.  Denies any chest pain or abdomina
l pain.

 

OBJECTIVE:

VITAL SIGNS:  With normal temperature.

LUNGS:  Symmetric air entry.

HEART:  S1 and S2, regular rate.

ABDOMEN:  Soft.

SKIN:  Areas of bruising in the upper extremities.

 

LABORATORY AND IMAGING DATA:  White cell count down to 8.4, hemoglobin 8.8 and platelets 184.  Sodium
 138, potassium 2.5 and creatinine 0.69.  Modified barium swallow study with a single episode of pene
tration, minimal aspiration demonstrated, persistent residue in the vallecular region.

 

ASSESSMENT AND DISCUSSION:  Chronic obstructive lung disease, coronary artery disease, pacemaker, per
ipheral vascular disease, chronic smoking, indwelling Hylton catheter and neutrophilia.  Now, discussi
ons regarding palliative care has been initiated.  The patient cannot have an MRI because of pacemake
r at this point and the issue of the mass in the right femur; it is somewhat moot point in view of th
e plan for palliative care.

## 2018-03-22 NOTE — RAD
BARIUM SWALLOW PERFORMED WITH SPEECH THERAPIST:

 

HISTORY:

Dysphagia with feeding difficulty.

 

FINDINGS:

The patient was given a variety of materials.

 

With thin barium, a single episode of penetration was noted.  With mechanical, soft texture food, the
re is an episode of penetration and minimal aspiration demonstrated.  There is persistent residue in 
the vallecular region with all residues.

 

IMPRESSION:

Findings as above.

 

POS: MARAH

## 2018-03-22 NOTE — PDOC.PN
- Subjective


Encounter Start Date: 03/22/18


Encounter Start Time: 12:00





Paitent seen today, Discussed about the mass on the Right Femur, which ID is 

evaaluting, with MRi. PT is lethargic and Confused.





- Objective


Resuscitation Status: 


 











Resuscitation Status           DNR:Do Not Resuscitate














MAR Reviewed: Yes


Vital Signs & Weight: 


 Vital Signs (12 hours)











  Temp Pulse Resp BP Pulse Ox


 


 03/22/18 14:40   80  18  


 


 03/22/18 10:21   68  16  


 


 03/22/18 10:07   68   


 


 03/22/18 08:41  97.4 F L  68  16  103/61  93 L


 


 03/22/18 08:00  97.4 F L  68  16   93 L


 


 03/22/18 06:45   70  16  








 Weight











Admit Weight                   137 lb 6 oz


 


Weight                         137 lb 6 oz














I&O: 


 











 03/21/18 03/22/18 03/23/18





 06:59 06:59 06:59


 


Intake Total 2667 2811 


 


Output Total 450 400 


 


Balance 2217 2411 











Result Diagrams: 


 03/22/18 04:03





 03/22/18 04:03


Additional Labs: 


 Accuchecks











  03/22/18 03/22/18 03/21/18





  11:39 04:24 19:55


 


POC Glucose  92  72  71











Radiology Reviewed by me: Yes





Phys Exam





- Physical Examination


HEENT: PERRLA, moist MMs


Neck: no nodes, no JVD


Respiratory: no wheezing, no rales


Cardiovascular: RRR, no significant murmur


Gastrointestinal: soft, non-tender


Musculoskeletal: no edema


Neurological: non-focal


Psychiatric: normal affect, A&O x 3





Dx/Plan


(1) Sepsis


Code(s): A41.9 - SEPSIS, UNSPECIFIED ORGANISM   Status: Acute   Comment: 

Patient is on IV antibiotics, Responded, with improving WBC, ID consulted, pt 

has Right femur mass, likley reason for failure to thrive, evalauting with MRI 

.   





(2) UTI (urinary tract infection)


Status: Acute   


Qualifiers: 


   Urinary tract infection type: catheter-associated UTI 


Comment: Contiue with IV Abx, improving cultures growing yeast, Will d/c 

vancomycin..   





(3) COPD (chronic obstructive pulmonary disease)


Status: Chronic   


Qualifiers: 


 


Comment: Stable O2 requirements. Not in exacerbartion. Continue RT, nebs


   





(4) Coronary artery disease


Code(s): I25.10 - ATHSCL HEART DISEASE OF NATIVE CORONARY ARTERY W/O ANG PCTRS 

  Status: Chronic   


Qualifiers: 


 


Comment: CATH showed inoperable lesion (~90% blockage L main, calcified)


Very poor prognosis as patient's clinical condition precluded CABG. Apparently [facility] in [place] willing to accept pt in transfer for operative mgmt.





   





(5) PNA (pneumonia)


Code(s): J18.9 - PNEUMONIA, UNSPECIFIED ORGANISM   Status: Resolved   


Qualifiers: 


 


Comment: Chest xray neg, will look for Pneumonia if persistant SOB,, Swallow 

study showed high risk of aspiration with all consistency   





(6) Yeast UTI


Code(s): B37.49 - OTHER UROGENITAL CANDIDIASIS   Status: Acute   Comment: Will 

start pt on Diflucan 200mg IV now and 100mg IV daily.   





(7) right Femur mass


Status: Acute   Comment: Mass suspicious for a malignancy, MRI pending now.   





- Plan


cont current plan of care, continue antibiotics, PT/OT, , speech 

therapy, respiratory therapy, incentive spirometry, DVT proph w/lovenox





* .








- Discharge Day


Encounter end time: 12:35





Review of Systems





- Review of Systems


Respiratory: Cough, Shortness of Breath


Cardiovascular: edema


Gastrointestinal: Nausea


Neurological: Confusion





- Medications/Allergies


Allergies/Adverse Reactions: 


 Allergies











Allergy/AdvReac Type Severity Reaction Status Date / Time


 


No Known Drug Allergies Allergy   Verified 03/19/18 16:26











Medications: 


 Current Medications





Acetaminophen (Tylenol)  650 mg PO Q4H PRN


   PRN Reason: Headache/Fever or Pain


Albuterol/Ipratropium (Duoneb)  3 ml NEB QID-RT [facility]


   Last Admin: 03/22/18 14:40 Dose:  3 ml


Atorvastatin Calcium (Lipitor)  80 mg PO DAILY [facility]


   Last Admin: 03/22/18 10:00 Dose:  80 mg


Clopidogrel Bisulfate (Plavix)  75 mg PO DAILY [facility]


   Last Admin: 03/22/18 10:01 Dose:  75 mg


Dextrose/Water (Dextrose 50%)  25 gm SLOW IVP PRN PRN


   PRN Reason: Hypoglycemia


Diltiazem HCl (Cardizem Cd)  240 mg PO DAILY [facility]


   Last Admin: 03/22/18 10:07 Dose:  Not Given


Docusate Sodium (Colace)  100 mg PO BID [facility]


   Last Admin: 03/22/18 10:00 Dose:  100 mg


Dronedarone (Multaq)  400 mg PO BID [facility]


   Last Admin: 03/22/18 10:02 Dose:  400 mg


Enoxaparin Sodium (Lovenox)  40 mg SC 0900 [facility]


   Last Admin: 03/22/18 10:00 Dose:  40 mg


Famotidine (Pepcid)  20 mg PO BID [facility]


   Last Admin: 03/22/18 10:00 Dose:  20 mg


Ferrous Sulfate (Feosol)  325 mg PO DAILY [facility]


   Last Admin: 03/22/18 10:00 Dose:  325 mg


Gabapentin (Neurontin)  300 mg PO TID [facility]


   Last Admin: 03/22/18 10:00 Dose:  300 mg


Glucagon (Glucagon)  1 mg IM PRN PRN


   PRN Reason: Hypoglycemia


Levofloxacin 500 mg/ Device  100 mls @ 100 mls/hr IVPB Q24HR [facility]


   Last Admin: 03/21/18 16:17 Dose:  100 mls


Sodium Chloride (Normal Saline 0.9%)  1,000 mls @ 100 mls/hr IV .Q10H [facility]


   Last Admin: 03/22/18 09:58 Dose:  1,000 mls


Cefepime HCl 1 gm/Miscellaneous Medication 1 each/ Sodium Chloride  10 mls @ 

120 mls/hr SLOW IVP Q12HR [facility]


   Last Admin: 03/22/18 09:59 Dose:  10 mls


Dextrose/Water (D5w)  1,000 mls @ 0 mls/hr IV .Q0M PRN; As Directed


   PRN Reason: Hypoglycemia


Fluconazole/Sodium Chloride (100 mg/ Device)  50 mls @ 100 mls/hr IVPB DAILY [facility]


   Last Admin: 03/22/18 10:04 Dose:  50 mls


Metoprolol Tartrate (Lopressor)  50 mg PO BID [facility]


   Last Admin: 03/22/18 10:05 Dose:  Not Given


Mometasone Furoate/Formoterol Fumar (Dulera 200 Mcg/5 Mcg Inhaler)  2 puff INH 

BID-RT [facility]


   Last Admin: 03/22/18 06:43 Dose:  2 puff


Potassium Chloride (Klor-Con)  40 meq PO Q4HR [facility]


   Stop: 03/22/18 17:01


   Last Admin: 03/22/18 10:04 Dose:  40 meq


Rivaroxaban (Xarelto)  15 mg PO DAILY [facility]


   Last Admin: 03/22/18 09:59 Dose:  15 mg


Senna (Senokot)  2 tab PO HSPRN PRN


   PRN Reason: Constipation


Sodium Chloride (Flush - Normal Saline)  10 ml IVF Q12HR [facility]


   Last Admin: 03/22/18 10:04 Dose:  10 ml


Sodium Chloride (Flush - Normal Saline)  10 ml IVF PRN PRN


   PRN Reason: Saline Flush


Tamsulosin HCl (Flomax)  0.4 mg PO DAILY [facility]


   Last Admin: 03/22/18 10:00 Dose:  0.4 mg

## 2018-03-23 RX ADMIN — CEFEPIME HYDROCHLORIDE SCH MLS: 1 INJECTION, POWDER, FOR SOLUTION INTRAMUSCULAR; INTRAVENOUS at 20:30

## 2018-03-23 RX ADMIN — MOMETASONE FUROATE AND FORMOTEROL FUMARATE DIHYDRATE SCH PUFF: 200; 5 AEROSOL RESPIRATORY (INHALATION) at 06:31

## 2018-03-23 RX ADMIN — Medication SCH ML: at 20:37

## 2018-03-23 RX ADMIN — MOMETASONE FUROATE AND FORMOTEROL FUMARATE DIHYDRATE SCH PUFF: 200; 5 AEROSOL RESPIRATORY (INHALATION) at 18:22

## 2018-03-23 RX ADMIN — CEFEPIME HYDROCHLORIDE SCH MLS: 1 INJECTION, POWDER, FOR SOLUTION INTRAMUSCULAR; INTRAVENOUS at 09:53

## 2018-03-23 RX ADMIN — Medication SCH ML: at 09:56

## 2018-03-23 RX ADMIN — FLUCONAZOLE SCH MLS: 2 INJECTION, SOLUTION INTRAVENOUS at 09:56

## 2018-03-23 NOTE — PDOC.PN
- Subjective


Encounter Start Date: 03/23/18


Encounter Start Time: 13:00





Paient is seen today, still remains confused. Pt has UTI/ with worsening 

Aspiration pneumonia.





- Objective


Resuscitation Status: 


 











Resuscitation Status           DNR:Do Not Resuscitate














MAR Reviewed: Yes


Vital Signs & Weight: 


 Vital Signs (12 hours)











  Temp Pulse Resp BP BP Pulse Ox


 


 03/23/18 14:20   84  24 H   


 


 03/23/18 12:00  97.4 F L  73  16   101/61  96


 


 03/23/18 10:29   74  20    92 L


 


 03/23/18 09:54   75   122/75  


 


 03/23/18 08:00  97.4 F L  74  20    96


 


 03/23/18 07:48  97.4 F L  75  16   122/75  92 L


 


 03/23/18 06:28   71  22 H   








 Weight











Admit Weight                   137 lb 6 oz


 


Weight                         137 lb 6 oz














I&O: 


 











 03/22/18 03/23/18 03/24/18





 06:59 06:59 06:59


 


Intake Total 2811 2730 


 


Output Total 400 325 


 


Balance 2411 2405 











Result Diagrams: 


 03/22/18 04:03





 03/22/18 04:03


Additional Labs: 


 Accuchecks











  03/23/18 03/23/18 03/22/18





  11:22 04:56 21:18


 


POC Glucose  102  94  81














  03/22/18





  16:02


 


POC Glucose  68 L











Radiology Reviewed by me: Yes





Phys Exam





- Physical Examination


HEENT: PERRLA, moist MMs


Neck: no nodes, no JVD


Respiratory: wheezing present


rales bilaterally upper lobes


Cardiovascular: RRR, no significant murmur


Gastrointestinal: soft, non-tender


Musculoskeletal: no edema, pulses present





Dx/Plan


(1) Sepsis


Code(s): A41.9 - SEPSIS, UNSPECIFIED ORGANISM   Status: Acute   Comment: 

Patient is on IV antibiotics, Responded, with improving WBC, ID consulted, pt 

has Right femur mass, rigoley reason for failure to thrive, evalauting with MRI 

, is still pending.   





(2) UTI (urinary tract infection)


Status: Acute   


Qualifiers: 


   Urinary tract infection type: catheter-associated UTI 


Comment: Contiue with IV Abx, improving cultures growing yeast, Will d/c 

vancomycin..   





(3) COPD (chronic obstructive pulmonary disease)


Status: Chronic   


Qualifiers: 


 


Comment: Stable O2 requirements. Not in exacerbartion. Continue RT, nebs


   





(4) Coronary artery disease


Code(s): I25.10 - ATHSCL HEART DISEASE OF NATIVE CORONARY ARTERY W/O ANG PCTRS 

  Status: Chronic   


Qualifiers: 


 


Comment: CATH showed inoperable lesion (~90% blockage L main, calcified)


Very poor prognosis as patient's clinical condition precluded CABG. Apparently [facility] in [place] willing to accept pt in transfer for operative mgmt.





   





(5) PNA (pneumonia)


Code(s): J18.9 - PNEUMONIA, UNSPECIFIED ORGANISM   Status: Resolved   


Qualifiers: 


 


Comment: Chest xray neg, will look for Pneumonia if persistant SOB,, Swallow 

study showed high risk of aspiration with all consistency   





(6) Yeast UTI


Code(s): B37.49 - OTHER UROGENITAL CANDIDIASIS   Status: Acute   Comment: Will 

start pt on Diflucan 200mg IV now and 100mg IV daily.   





(7) right Femur mass


Status: Acute   Comment: Mass suspicious for a malignancy, MRI pending now.   





- Plan


cont current plan of care, continue antibiotics, PT/OT, , speech 

therapy, respiratory therapy, incentive spirometry, DVT proph w/lovenox





* .








- Discharge Day


Encounter end time: 13:35





Review of Systems





- Review of Systems


Eyes: negative: Pain, Vision Change, Conjunctivae Inflammation, Eyelid 

Inflammation, Redness, Other


ENT: negative: Ear Pain, Ear Discharge, Nose Pain, Nose Discharge, Nose 

Congestion, Mouth Pain, Mouth Swelling, Throat Pain, Throat Swelling, Other


Respiratory: Cough, Shortness of Breath, Wheezing.  negative: Dry, Hemoptysis, 

SOB with Excertion, Pleuritic Pain, Sputum


Cardiovascular: negative: chest pain, palpitations, orthopnea, paroxysmal 

nocturnal dyspnea, edema, light headedness, other


Gastrointestinal: negative: Nausea, Vomiting, Abdominal Pain, Diarrhea, 

Constipation, Melena, Hematochezia, Other


Genitourinary: negative: Dysuria, Frequency, Incontinence, Hematuria, Retention

, Other





- Medications/Allergies


Allergies/Adverse Reactions: 


 Allergies











Allergy/AdvReac Type Severity Reaction Status Date / Time


 


No Known Drug Allergies Allergy   Verified 03/19/18 16:26











Medications: 


 Current Medications





Acetaminophen (Tylenol)  650 mg PO Q4H PRN


   PRN Reason: Headache/Fever or Pain


Albuterol/Ipratropium (Duoneb)  3 ml NEB QID-RT [facility]


   Last Admin: 03/23/18 14:20 Dose:  3 ml


Atorvastatin Calcium (Lipitor)  80 mg PO DAILY [facility]


   Last Admin: 03/23/18 09:55 Dose:  80 mg


Clopidogrel Bisulfate (Plavix)  75 mg PO DAILY [facility]


   Last Admin: 03/23/18 09:55 Dose:  75 mg


Dextrose/Water (Dextrose 50%)  25 gm SLOW IVP PRN PRN


   PRN Reason: Hypoglycemia


Diltiazem HCl (Cardizem Cd)  240 mg PO DAILY [facility]


   Last Admin: 03/23/18 09:54 Dose:  240 mg


Docusate Sodium (Colace)  100 mg PO BID [facility]


   Last Admin: 03/23/18 09:55 Dose:  100 mg


Dronedarone (Multaq)  400 mg PO BID [facility]


   Last Admin: 03/23/18 09:55 Dose:  400 mg


Famotidine (Pepcid)  20 mg PO BID [facility]


   Last Admin: 03/23/18 09:55 Dose:  20 mg


Ferrous Sulfate (Feosol)  325 mg PO DAILY [facility]


   Last Admin: 03/23/18 09:55 Dose:  325 mg


Gabapentin (Neurontin)  300 mg PO TID [facility]


   Last Admin: 03/23/18 09:55 Dose:  300 mg


Glucagon (Glucagon)  1 mg IM PRN PRN


   PRN Reason: Hypoglycemia


Levofloxacin 500 mg/ Device  100 mls @ 100 mls/hr IVPB Q24HR [facility]


   Last Admin: 03/22/18 16:29 Dose:  100 mls


Sodium Chloride (Normal Saline 0.9%)  1,000 mls @ 100 mls/hr IV .Q10H [facility]


   Last Admin: 03/23/18 14:14 Dose:  1,000 mls


Cefepime HCl 1 gm/Miscellaneous Medication 1 each/ Sodium Chloride  10 mls @ 

120 mls/hr SLOW IVP Q12HR [facility]


   Last Admin: 03/23/18 09:53 Dose:  10 mls


Dextrose/Water (D5w)  1,000 mls @ 0 mls/hr IV .Q0M PRN; As Directed


   PRN Reason: Hypoglycemia


Fluconazole/Sodium Chloride (100 mg/ Device)  50 mls @ 100 mls/hr IVPB DAILY [facility]


   Last Admin: 03/23/18 09:56 Dose:  50 mls


Metoprolol Tartrate (Lopressor)  50 mg PO BID [facility]


   Last Admin: 03/23/18 09:55 Dose:  50 mg


Mometasone Furoate/Formoterol Fumar (Dulera 200 Mcg/5 Mcg Inhaler)  2 puff INH 

BID-RT [facility]


   Last Admin: 03/23/18 06:31 Dose:  2 puff


Ondansetron HCl (Zofran Odt)  4 mg PO Q6H PRN


   PRN Reason: Nausea/Vomiting


Rivaroxaban (Xarelto)  15 mg PO DAILY [facility]


   Last Admin: 03/23/18 09:54 Dose:  15 mg


Senna (Senokot)  2 tab PO HSPRN PRN


   PRN Reason: Constipation


Sodium Chloride (Flush - Normal Saline)  10 ml IVF Q12HR [facility]


   Last Admin: 03/23/18 09:56 Dose:  10 ml


Sodium Chloride (Flush - Normal Saline)  10 ml IVF PRN PRN


   PRN Reason: Saline Flush


Tamsulosin HCl (Flomax)  0.4 mg PO DAILY [facility]


   Last Admin: 03/23/18 09:54 Dose:  0.4 mg

## 2018-03-24 LAB
ALBUMIN SERPL BCG-MCNC: 2.5 G/DL (ref 3.4–4.8)
ANION GAP SERPL CALC-SCNC: 14 MMOL/L (ref 10–20)
BUN SERPL-MCNC: 18 MG/DL (ref 8.4–25.7)
BUN/CREAT SERPL: 16.22
CALCIUM SERPL-MCNC: 8.5 MG/DL (ref 7.8–10.44)
CHLORIDE SERPL-SCNC: 116 MMOL/L (ref 98–107)
CO2 SERPL-SCNC: 14 MMOL/L (ref 23–31)
CREAT CL PREDICTED SERPL C-G-VRATE: 49 ML/MIN (ref 70–130)
GLUCOSE SERPL-MCNC: 62 MG/DL (ref 83–110)
HGB BLD-MCNC: 9.2 G/DL (ref 14–18)
MAGNESIUM SERPL-MCNC: 1.4 MG/DL (ref 1.6–2.6)
PLATELET # BLD AUTO: 254 THOU/UL (ref 130–400)
POTASSIUM SERPL-SCNC: 4.3 MMOL/L (ref 3.5–5.1)
SODIUM SERPL-SCNC: 140 MMOL/L (ref 136–145)

## 2018-03-24 RX ADMIN — MOMETASONE FUROATE AND FORMOTEROL FUMARATE DIHYDRATE SCH: 200; 5 AEROSOL RESPIRATORY (INHALATION) at 06:48

## 2018-03-24 RX ADMIN — FLUCONAZOLE SCH MLS: 2 INJECTION, SOLUTION INTRAVENOUS at 10:43

## 2018-03-24 RX ADMIN — CEFEPIME HYDROCHLORIDE SCH MLS: 1 INJECTION, POWDER, FOR SOLUTION INTRAMUSCULAR; INTRAVENOUS at 21:47

## 2018-03-24 RX ADMIN — Medication SCH ML: at 21:52

## 2018-03-24 RX ADMIN — MOMETASONE FUROATE AND FORMOTEROL FUMARATE DIHYDRATE SCH: 200; 5 AEROSOL RESPIRATORY (INHALATION) at 19:20

## 2018-03-24 RX ADMIN — Medication SCH ML: at 10:43

## 2018-03-24 RX ADMIN — CEFEPIME HYDROCHLORIDE SCH MLS: 1 INJECTION, POWDER, FOR SOLUTION INTRAMUSCULAR; INTRAVENOUS at 10:42

## 2018-03-24 NOTE — RAD
PORTABLE CHEST:

 

Date:  03/24/18 

 

PROVIDED CLINICAL HISTORY:   

Shortness of breath. 

 

FINDINGS:

 

Comparison made with the study dated 03/19/18. 

 

Cardiac silhouette remains enlarged. Prominence of the pulmonary vasculature and pulmonary interstiti
um are redemonstrated. Right basilar pleural parenchymal opacity has developed, likely reflecting a c
ombination of pleural fluid and adjacent infiltrate or atelectasis. There is bilateral perihilar air 
space disease suggesting alveolar edema. No evidence for pneumothorax. 

 

IMPRESSION: 

Findings compatible with congestive failure, with right pleural effusion and alveolar edema. Follow-u
p is recommended. 

 

 

POS: ERIC

## 2018-03-24 NOTE — PDOC.PN
- Subjective


Encounter Start Date: 03/24/18


Encounter Start Time: 13:00


Patient is seen today, lethergic and drowsy, he has low blood pressures noted 

with Systolic in 70





- Objective


Resuscitation Status: 


 











Resuscitation Status           DNR:Do Not Resuscitate














MAR Reviewed: Yes


Vital Signs & Weight: 


 Vital Signs (12 hours)











  Temp Pulse Resp BP Pulse Ox


 


 03/24/18 14:16   74  26 H   100


 


 03/24/18 10:43   79   


 


 03/24/18 10:10   79  20   96


 


 03/24/18 08:05   71  18  84/54 L  96


 


 03/24/18 08:00  97.4 F L  71  18  77/47 L  96


 


 03/24/18 06:47   73  22 H   96








 Weight











Admit Weight                   137 lb 6 oz


 


Weight                         137 lb 6 oz














I&O: 


 











 03/23/18 03/24/18 03/25/18





 06:59 06:59 06:59


 


Intake Total 2730 2150 


 


Output Total 325 220 


 


Balance 2405 1930 











Result Diagrams: 


 03/24/18 05:13





 03/24/18 05:13


Additional Labs: 


 Accuchecks











  03/24/18 03/24/18 03/23/18





  11:30 05:38 19:40


 


POC Glucose  103  74  74














  03/23/18





  16:00


 


POC Glucose  79











Radiology Reviewed by me: Yes





Phys Exam





- Physical Examination


HEENT: PERRLA, moist MMs


Neck: no nodes


JVD noted


Respiratory: wheezing present


Cardiovascular: RRR, no significant murmur


Gastrointestinal: soft, non-tender


Musculoskeletal: edema present


Lymphatic: no nodes





Dx/Plan


(1) Hypotension


Status: Acute   Comment: Will do Fluid Bolus of 250ml, With Worseing CHF, pt 

will go into Acute Exacerbation of CHF, Poor prognosis with multiple 

Comorbidities, Family waiting to make decision regaring hospice, paliative care 

involved.   





(2) Sepsis


Code(s): A41.9 - SEPSIS, UNSPECIFIED ORGANISM   Status: Acute   Comment: 

Patient is on IV antibiotics, Worseing mental status and Drop in Blood 

pressures from Sepsis.   





(3) UTI (urinary tract infection)


Status: Acute   


Qualifiers: 


   Urinary tract infection type: catheter-associated UTI 


Comment: Contiue with IV Abx, improving cultures growing yeast, Will d/c 

vancomycin..   





(4) COPD (chronic obstructive pulmonary disease)


Status: Chronic   


Qualifiers: 


 


Comment: worseing O2 requirements. . Continue RT, nebs, pt is DNR.


   





(5) Coronary artery disease


Code(s): I25.10 - ATHSCL HEART DISEASE OF NATIVE CORONARY ARTERY W/O ANG PCTRS 

  Status: Chronic   


Qualifiers: 


 


Comment: CATH showed inoperable lesion (~90% blockage L main, calcified)


Very poor prognosis as patient's clinical condition precluded CABG. Apparently [facility] in [place] willing to accept pt in transfer for operative mgmt.





   





(6) PNA (pneumonia)


Code(s): J18.9 - PNEUMONIA, UNSPECIFIED ORGANISM   Status: Resolved   


Qualifiers: 


 


Comment: Chest xray today showed Worseing pulmonary edema., Will continue with 

IV antibitoics.   





(7) Yeast UTI


Code(s): B37.49 - OTHER UROGENITAL CANDIDIASIS   Status: Acute   Comment: Will 

start pt on Diflucan 200mg IV now and 100mg IV daily.   





(8) right Femur mass


Status: Acute   Comment: Mass suspicious for a malignancy, MRI pending now.   





(9) Dysphagia


Code(s): R13.10 - DYSPHAGIA, UNSPECIFIED   Status: Acute   





- Plan


cont current plan of care, PT/OT, , speech therapy, respiratory 

therapy, incentive spirometry, DVT proph w/lovenox





* .








- Discharge Day


Encounter end time: 13:35





Review of Systems





- Review of Systems


Other: 





Unable to get ROS, pt is too Drowsy to answer.





- Medications/Allergies


Allergies/Adverse Reactions: 


 Allergies











Allergy/AdvReac Type Severity Reaction Status Date / Time


 


No Known Drug Allergies Allergy   Verified 03/19/18 16:26











Medications: 


 Current Medications





Acetaminophen (Tylenol)  650 mg PO Q4H PRN


   PRN Reason: Headache/Fever or Pain


Albuterol/Ipratropium (Duoneb)  3 ml NEB QID-RT [facility]


   Last Admin: 03/24/18 14:16 Dose:  3 ml


Atorvastatin Calcium (Lipitor)  80 mg PO DAILY [facility]


   Last Admin: 03/24/18 10:44 Dose:  Not Given


Clopidogrel Bisulfate (Plavix)  75 mg PO DAILY [facility]


   Last Admin: 03/24/18 10:44 Dose:  Not Given


Dextrose/Water (Dextrose 50%)  25 gm SLOW IVP PRN PRN


   PRN Reason: Hypoglycemia


Diltiazem HCl (Cardizem Cd)  240 mg PO DAILY [facility]


   Last Admin: 03/24/18 10:43 Dose:  Not Given


Docusate Sodium (Colace)  100 mg PO BID [facility]


   Last Admin: 03/24/18 10:44 Dose:  Not Given


Dronedarone (Multaq)  400 mg PO BID [facility]


   Last Admin: 03/24/18 10:44 Dose:  Not Given


Famotidine (Pepcid)  20 mg PO BID [facility]


   Last Admin: 03/24/18 10:44 Dose:  Not Given


Ferrous Sulfate (Feosol)  325 mg PO DAILY [facility]


   Last Admin: 03/24/18 10:44 Dose:  Not Given


Gabapentin (Neurontin)  300 mg PO TID [facility]


   Last Admin: 03/24/18 10:45 Dose:  Not Given


Glucagon (Glucagon)  1 mg IM PRN PRN


   PRN Reason: Hypoglycemia


Levofloxacin 500 mg/ Device  100 mls @ 100 mls/hr IVPB Q24HR [facility]


   Last Admin: 03/23/18 16:32 Dose:  100 mls


Cefepime HCl 1 gm/Miscellaneous Medication 1 each/ Sodium Chloride  10 mls @ 

120 mls/hr SLOW IVP Q12HR [facility]


   Last Admin: 03/24/18 10:42 Dose:  10 mls


Dextrose/Water (D5w)  1,000 mls @ 0 mls/hr IV .Q0M PRN; As Directed


   PRN Reason: Hypoglycemia


Fluconazole/Sodium Chloride (100 mg/ Device)  50 mls @ 100 mls/hr IVPB DAILY [facility]


   Last Admin: 03/24/18 10:43 Dose:  50 mls


Metoprolol Tartrate (Lopressor)  50 mg PO BID [facility]


   Last Admin: 03/24/18 10:45 Dose:  Not Given


Mometasone Furoate/Formoterol Fumar (Dulera 200 Mcg/5 Mcg Inhaler)  2 puff INH 

BID-RT [facility]


   Last Admin: 03/24/18 06:48 Dose:  Not Given


Ondansetron HCl (Zofran Odt)  4 mg PO Q6H PRN


   PRN Reason: Nausea/Vomiting


Rivaroxaban (Xarelto)  15 mg PO DAILY [facility]


   Last Admin: 03/24/18 10:45 Dose:  Not Given


Senna (Senokot)  2 tab PO HSPRN PRN


   PRN Reason: Constipation


Sodium Chloride (Flush - Normal Saline)  10 ml IVF Q12HR [facility]


   Last Admin: 03/24/18 10:43 Dose:  10 ml


Sodium Chloride (Flush - Normal Saline)  10 ml IVF PRN PRN


   PRN Reason: Saline Flush


Tamsulosin HCl (Flomax)  0.4 mg PO DAILY [facility]


   Last Admin: 03/24/18 10:45 Dose:  Not Given

## 2018-03-25 VITALS — DIASTOLIC BLOOD PRESSURE: 92 MMHG | SYSTOLIC BLOOD PRESSURE: 132 MMHG

## 2018-03-25 VITALS — TEMPERATURE: 97.3 F

## 2018-03-25 RX ADMIN — FLUCONAZOLE SCH MLS: 2 INJECTION, SOLUTION INTRAVENOUS at 08:32

## 2018-03-25 RX ADMIN — CEFEPIME HYDROCHLORIDE SCH MLS: 1 INJECTION, POWDER, FOR SOLUTION INTRAMUSCULAR; INTRAVENOUS at 08:32

## 2018-03-25 RX ADMIN — MOMETASONE FUROATE AND FORMOTEROL FUMARATE DIHYDRATE SCH: 200; 5 AEROSOL RESPIRATORY (INHALATION) at 06:23

## 2018-03-25 NOTE — DS
DATE OF ADMISSION:  2018

 

DATE OF EXPIRATION:  2018

 

DEATH SUMMARY

 

DISCHARGE DIAGNOSES:

1.  Sepsis, suspected from urinary tract infection.

2.  Urinary tract infection with yeast species and in the context of chronic indwelling Hylton cathete
r.

3.  Acute metabolic encephalopathy, multifactorial.

4.  Coronary artery disease.

5.  Multiple gastric ulcers.

6.  Severe peripheral vascular disease.

7.  Bilateral carotid artery disease.

8.  Status post pacemaker placement.

9.  Chronic obstructive pulmonary disease.

10.  Severe deconditioning.

11.  Right proximal femoral diaphysis mass, incompletely imaged of the right femur.

 

CONSULTATIONS:  Dr. Garcia with Infectious Disease Service.

 

PERTINENT LABORATORY DATA AND X-RAY FINDINGS:  Potassium ranged between 2.5-4.3, lactic acid level ra
nged between 0.9-2.5, phosphorus 3.7, and magnesium level 1.4.  LFTs within normal limits.  CBC showe
d a white blood cell count ranging between 8.4-16.4, hemoglobin ranged between 8.8-11.2.  Urine cultu
re dated 2018 showed greater than 100,000 colonies of yeast species.  Blood cultures x2 from  showed no growth at 5 days.  Influenza A and B antigen dated 2018 negative.  Portable 
chest x-ray dated 2018 showed a right pleural effusion.  CT of the brain without contrast dated
 2018 showed no acute intracranial process.  CT of the abdomen and pelvis dated 2018 show
ed mass-like structure of the right proximal femoral diaphysis, incompletely imaged.  Moderate size b
ilateral pleural effusions with bibasilar consolidation.  Nonobstructing left renal calculus.  Please
 see dictated report for full details.  Modified barium swallow study dated 2018 showed persist
ent residue in the vallecular region with all residues.  Penetration noted with minimal aspiration wi
th mechanical and soft texture food.  Portable chest x-ray dated 2018 showed increasing pulmona
ry edema.

 

HOSPITAL COURSE:  Patient was initially admitted to the medical floor after presenting with suspected
 sepsis syndrome with acute encephalopathy and altered mentation.  The patient underwent extensive ev
aluation including metabolic and neuro imaging showing no evidence of an acute CVA.  The patient was 
treated with broad-spectrum IV antibiotic therapy including cefepime, Levaquin, and vancomycin after 
concern for infectious process due to an indwelling Hylton catheter.  Urine culture did show greater t
paz 100,000 colonies of yeast species at which point the patient was treated with IV fluconazole.  Th
e patient was evaluated by the Infectious Disease Service with recommendations to continue therapy di
rected at this organism.  The patient also underwent general evaluation after concern for aspiration 
pneumonia.  The patient had a single episode of mild penetration as stated previously under the study
.  The patient was noted with severe deconditioning with fluctuating mental status and encephalopathy
.  Despite supportive measures antibiotic therapy and directed therapy for yeast urinary infection, t
he patient continued to clinically decline.  The patient was noted with hypotensive episodes requirin
g a small volume IV boluses of normal saline as well as intermittent treatment with Ativan for agitat
ion.  The patient's respiratory status and oxygen saturations fluctuated requiring acute intervention
 and adjustment to his oxygen supplementation.  The patient was noted with decreasing urine output, w
orsening change in mental status as well as hypotensive episodes with progressive clinical decline no
jaimee on 2018.  The patient was given increasing oxygen supplementation; however, the patient was
 unable to recover despite acute intervention.  The patient with current DNR status without wishes fo
r CPR or acute intervention.  The patient  at 13:55 p.m. on 2018.  Family members were n
otified and decedent affairs coordinated transfer of the patient.

## 2018-04-05 NOTE — PQF
NAV WENSP DO

[id number]                                                             [id number]

[id number]                             

                                   

CLINICAL DOCUMENTATION CLARIFICATION FORM:  POST DISCHARGE



Addendum to original discharge summary date:  3/25/2018







DATE:  4/5/2018                                                ATTN:  Dr. Flores



Please exercise your independent, professional judgment in responding to the 
clarification form. 

Clinical indicators are provided on the bottom of this form for your review



Please check appropriate box(s):



____x___   I (concur)  with the Wound Care findings as stated below.



Per wound care assessment 3-20

Pressure Ulcer  Stage 3 Right heel.   

Pressure Ulcer left heel unstageable.

Pressure Ulcer  left sacrum stage 3.



[  ] No pressure ulcer diagnosis



[  ] Deep tissue injury



[  ] Other diagnosis (please specify) 



[  ] Unable to determine



In addition, please specify:

Present on Admission (POA):  [ x ] Yes             [  ] No             [  ] 
Unable to determine



For continuity of documentation, please document condition throughout progress 
notes and discharge summary.  Thank You.



CLINICAL INDICATORS - SIGNS / SYMPTOMS / LABS

H&P:  ulcer on right heel and left ankle.  



RISK FACTORS:

Per H&P:  Physically deconditioned. 



TREATMENTS:

Wound care consult







 





(This form is maintained as a part of the permanent medical record)

2015 [employer]  All Rights Reserved

[email or web address]    [phone]

                                                              



MAIRA

## 2018-04-05 NOTE — PQF
BEHZADNAV FLORESSP DO

[id number]                                                             [id number]

[id number]                             

                                   

CLINICAL DOCUMENTATION CLARIFICATION FORM:  POST DISCHARGE



Addendum to original discharge summary date:  3/25/2018







DATE:    4/5/2018                     ATTN:   Dr. Flores





Please exercise your independent, professional judgment in responding to the 
clarification form. 

Clinical indicators are provided on the bottom of this form for your review



Please check appropriate box(s):



[  ] Aspiration Pneumonia

[  ] Empirically treating Gram Negative Pneumonia

[  ] Empirically treating Anaerobic Pneumonia

[  ] Pneumonia secondary to (specify organism / underlying disease) ____________
_________________ 

[  ] Simple Pneumonia (community acquired - nosocomial)

[  ] Bronchopneumonia

[  ] Pneumonia of unknown etiology

[  ] Other diagnosis (please specify)  

[ x ] Unable to determine



In addition, please specify:

Present on Admission (POA):  [  ] Yes             [  ] No             [ x ] 
Unable to determine



For continuity of documentation, please document condition throughout progress 
notes and discharge summary.  Thank You.





CLINICAL INDICATORS - SIGNS / SYMPTOMS / LABS

Barium swallow:  dysphagia w/ feeding difficulty; with thin barium, single 
episode of penetration noted..  With Mechanical, soft texture food, there is an 
episode of penetration and minimal aspiration demonstrated.  There is 
persistent residue in the vallecular region  w/ all residues.  

H&P:  speech eval to r/o aspiration.

Jose consult:  aspiration PNA is not ruled out.

PN 3-23 Gadam:  worsening aspiration PNA.

DC summary:  underwent general eval for concern for aspiration PNA.  Had single 
episode of mild penetration.  Severe deconditioning. Hypotensive episodes 
requiring small IV boluses of NS.  Respiratory status and oxygen supplementat.  
Pt was given increasing oxygen supplementation  however, pt was unable to 
recover.  

3-19 CXR:  right pleural effusion.

3-20 ABD/Pelvis CT:  Moderate bilateral pleural effusions w/ consolidation at 
each lung base.

3-24 CXR  increasing pulmonary edema  compatible w/ CHF.





RISK FACTORS (per H&P)

Dysphagia.  

History of smoking.  

COPD.  

Was started on Levaquin on 3/14 for suspected pneumonia.  

Physically deconditioned.  



TREATMENTS: (per progress notes)

Maxipime IV / Levaquin IV.  

O2 therapy. 

IV fluids

Serial CXRs









(This form is maintained as a part of the permanent medical record)

2015 [employer].  All Rights Reserved

Cayla [email or web address]    [phone]

                                                              



[facility]

## 2018-04-08 NOTE — EKG
Test Reason : 

Blood Pressure : ***/*** mmHG

Vent. Rate : 112 BPM     Atrial Rate : 277 BPM

   P-R Int : 000 ms          QRS Dur : 088 ms

    QT Int : 384 ms       P-R-T Axes : 000 038 229 degrees

   QTc Int : 524 ms

 

Atrial fibrillation with rapid ventricular response with premature ventricular or aberrantly conducte
d complexes

Inferior infarct , age undetermined



Abnormal ECG

 

Confirmed by RAYMUNDO PEDERSEN, VIKAS ([id number]),  MELANIE KELLEY ([id number]) on 4/8/2018 1:25:33 AM

 

Referred By:             Confirmed By:VIKAS FONSECA MD

## 2022-01-01 NOTE — PDOC.PN
- Subjective


Encounter Start Date: 03/20/18


Encounter Start Time: 09:00





Patient is seen today, more alert and orientd. explained about his Sepsis and 

UTI. He has No concer nsnoted.





- Objective


Resuscitation Status: 


 











Resuscitation Status           DNR:Do Not Resuscitate














MAR Reviewed: Yes


Vital Signs & Weight: 


 Vital Signs (12 hours)











  Temp Pulse Resp BP BP Pulse Ox


 


 03/20/18 13:32   70  14    94 L


 


 03/20/18 10:13   78   104/51 L  


 


 03/20/18 09:34   78  16    93 L


 


 03/20/18 08:00  98.3 F  78  17   104/51 L  98


 


 03/20/18 05:53   72  18    94 L


 


 03/20/18 05:51   72  16    94 L








 Weight











Admit Weight                   137 lb 6 oz


 


Weight                         137 lb 6 oz














I&O: 


 











 03/19/18 03/20/18 03/21/18





 06:59 06:59 06:59


 


Intake Total  1525 240


 


Output Total  400 


 


Balance  1125 240











Result Diagrams: 


 03/20/18 05:08





 03/20/18 05:08


Additional Labs: 


 Accuchecks











  03/20/18





  06:35


 


POC Glucose  92











Radiology Reviewed by me: Yes


EKG Reviewed by me: Yes





Phys Exam





- Physical Examination


HEENT: PERRLA, moist MMs


Neck: no nodes, no JVD


Respiratory: wheezing present


Cardiovascular: RRR, no significant murmur


Gastrointestinal: soft, non-tender


Musculoskeletal: no edema, pulses present


Neurological: non-focal, normal sensation





Dx/Plan


(1) Sepsis


Code(s): A41.9 - SEPSIS, UNSPECIFIED ORGANISM   Status: Acute   Comment: 

Patient is on IV antibiotics, Responded, with improving WBC, ID consulted.    





(2) UTI (urinary tract infection)


Status: Acute   


Qualifiers: 


   Urinary tract infection type: catheter-associated UTI 


Comment: Contiue with IV Abx, improving pending [place]re sensitivities.   





(3) COPD (chronic obstructive pulmonary disease)


Status: Chronic   


Qualifiers: 


 


Comment: Stable O2 requirements. Not in exacerbartion. Continue RT, nebs


   





(4) Coronary artery disease


Code(s): I25.10 - ATHSCL HEART DISEASE OF NATIVE CORONARY ARTERY W/O ANG PCTRS 

  Status: Chronic   


Qualifiers: 


 


Comment: CATH showed inoperable lesion (~90% blockage L main, calcified)


Very poor prognosis as patient's clinical condition precluded CABG. Apparently [facility] in [place] willing to accept pt in transfer for operative mgmt.





   





(5) PNA (pneumonia)


Code(s): J18.9 - PNEUMONIA, UNSPECIFIED ORGANISM   Status: Resolved   


Qualifiers: 


 


Comment: Chest xray neg, will look for Pneumonia if persistant SOB,, Swallow 

study today.    





- Plan


cont current plan of care, omer catheter, continue antibiotics, PT/OT, social 

services, speech therapy, incentive spirometry, out of bed/ambulate, DVT proph w

/lovenox





* .








- Discharge Day


Encounter end time: 09:35





Review of Systems





- Review of Systems


Eyes: negative: Pain, Vision Change, Conjunctivae Inflammation, Eyelid 

Inflammation, Redness, Other


ENT: negative: Ear Pain, Ear Discharge, Nose Pain, Nose Discharge, Nose 

Congestion, Mouth Pain, Mouth Swelling, Throat Pain, Throat Swelling, Other


Respiratory: negative: Cough, Dry, Shortness of Breath, Hemoptysis, SOB with 

Excertion, Pleuritic Pain, Sputum, Wheezing


Cardiovascular: edema.  negative: chest pain, palpitations, orthopnea, 

paroxysmal nocturnal dyspnea, light headedness, other


Gastrointestinal: negative: Nausea, Vomiting, Abdominal Pain, Diarrhea, 

Constipation, Melena, Hematochezia, Other


Genitourinary: negative: Dysuria, Frequency, Incontinence, Hematuria, Retention

, Other


Musculoskeletal: negative: Neck Pain, Shoulder Pain, Arm Pain, Back Pain, Hand 

Pain, Leg Pain, Foot Pain, Other


Skin: negative: Rash, Lesions, Rey, Bruising, Other





- Medications/Allergies


Allergies/Adverse Reactions: 


 Allergies











Allergy/AdvReac Type Severity Reaction Status Date / Time


 


No Known Drug Allergies Allergy   Verified 03/19/18 16:26











Medications: 


 Current Medications





Acetaminophen (Tylenol)  650 mg PO Q4H PRN


   PRN Reason: Headache/Fever or Pain


Albuterol/Ipratropium (Duoneb)  3 ml NEB QID-RT [facility]


   Last Admin: 03/20/18 13:32 Dose:  3 ml


Atorvastatin Calcium (Lipitor)  80 mg PO DAILY [facility]


   Last Admin: 03/20/18 10:16 Dose:  80 mg


Clopidogrel Bisulfate (Plavix)  75 mg PO DAILY [facility]


   Last Admin: 03/20/18 10:13 Dose:  75 mg


Dextrose/Water (Dextrose 50%)  25 gm SLOW IVP PRN PRN


   PRN Reason: Hypoglycemia


Diltiazem HCl (Cardizem Cd)  240 mg PO DAILY [facility]


   Last Admin: 03/20/18 10:13 Dose:  240 mg


Docusate Sodium (Colace)  100 mg PO BID [facility]


   Last Admin: 03/20/18 10:17 Dose:  Not Given


Dronedarone (Multaq)  400 mg PO BID [facility]


   Last Admin: 03/20/18 10:16 Dose:  400 mg


Enoxaparin Sodium (Lovenox)  40 mg SC 0900 [facility]


   Last Admin: 03/20/18 10:17 Dose:  40 mg


Famotidine (Pepcid)  20 mg PO BID [facility]


   Last Admin: 03/20/18 10:16 Dose:  20 mg


Ferrous Sulfate (Feosol)  325 mg PO DAILY [facility]


   Last Admin: 03/20/18 10:13 Dose:  325 mg


Fluconazole (Diflucan)  100 mg PO DAILY [facility]


   Last Admin: 03/20/18 10:16 Dose:  100 mg


Gabapentin (Neurontin)  300 mg PO TID [facility]


   Last Admin: 03/20/18 16:14 Dose:  300 mg


Glucagon (Glucagon)  1 mg IM PRN PRN


   PRN Reason: Hypoglycemia


Levofloxacin 500 mg/ Device  100 mls @ 100 mls/hr IVPB Q24HR [facility]


   Last Admin: 03/20/18 16:13 Dose:  100 mls


Sodium Chloride (Normal Saline 0.9%)  1,000 mls @ 100 mls/hr IV .Q10H [facility]


   Last Admin: 03/20/18 10:29 Dose:  1,000 mls


Cefepime HCl 1 gm/Miscellaneous Medication 1 each/ Sodium Chloride  10 mls @ 

120 mls/hr SLOW IVP Q12HR [facility]


   Last Admin: 03/20/18 10:17 Dose:  10 mls


Vancomycin HCl 1 gm/ Device  200 mls @ 200 mls/hr IVPB 0300,1500 [facility]


   Last Admin: 03/20/18 16:14 Dose:  200 mls


Dextrose/Water (D5w)  1,000 mls @ 0 mls/hr IV .Q0M PRN; As Directed


   PRN Reason: Hypoglycemia


Metoprolol Tartrate (Lopressor)  50 mg PO BID [facility]


   Last Admin: 03/20/18 10:13 Dose:  50 mg


Miscellaneous Medication (Pharmacy To Dose)  1 each IVPB PRN PRN


   PRN Reason: .


Mometasone Furoate/Formoterol Fumar (Dulera 200 Mcg/5 Mcg Inhaler)  2 puff INH 

BID-RT [facility]


   Last Admin: 03/20/18 05:53 Dose:  2 puff


Rivaroxaban (Xarelto)  15 mg PO DAILY [facility]


   Last Admin: 03/20/18 10:13 Dose:  15 mg


Senna (Senokot)  2 tab PO HSPRN PRN


   PRN Reason: Constipation


Sodium Chloride (Flush - Normal Saline)  10 ml IVF Q12HR [facility]


   Last Admin: 03/20/18 10:17 Dose:  Not Given


Sodium Chloride (Flush - Normal Saline)  10 ml IVF PRN PRN


   PRN Reason: Saline Flush


Tamsulosin HCl (Flomax)  0.4 mg PO DAILY [facility]


   Last Admin: 03/20/18 10:13 Dose:  0.4 mg SCM